# Patient Record
Sex: FEMALE | Race: WHITE | NOT HISPANIC OR LATINO | Employment: OTHER | URBAN - METROPOLITAN AREA
[De-identification: names, ages, dates, MRNs, and addresses within clinical notes are randomized per-mention and may not be internally consistent; named-entity substitution may affect disease eponyms.]

---

## 2018-02-26 ENCOUNTER — HOSPITAL ENCOUNTER (EMERGENCY)
Facility: HOSPITAL | Age: 33
Discharge: HOME | End: 2018-02-26
Attending: EMERGENCY MEDICINE
Payer: COMMERCIAL

## 2018-02-26 VITALS
OXYGEN SATURATION: 100 % | HEART RATE: 101 BPM | WEIGHT: 154 LBS | SYSTOLIC BLOOD PRESSURE: 152 MMHG | DIASTOLIC BLOOD PRESSURE: 96 MMHG | BODY MASS INDEX: 26.29 KG/M2 | HEIGHT: 64 IN | TEMPERATURE: 98 F | RESPIRATION RATE: 16 BRPM

## 2018-02-26 DIAGNOSIS — R07.9 CHEST PAIN, UNSPECIFIED TYPE: ICD-10-CM

## 2018-02-26 DIAGNOSIS — F41.0 PANIC ATTACK: Primary | ICD-10-CM

## 2018-02-26 LAB
ALBUMIN SERPL BCP-MCNC: 4.5 G/DL
ALP SERPL-CCNC: 69 U/L
ALT SERPL W/O P-5'-P-CCNC: 27 U/L
ANION GAP SERPL CALC-SCNC: 14 MMOL/L
AST SERPL-CCNC: 36 U/L
B-HCG UR QL: NEGATIVE
BASOPHILS # BLD AUTO: 0.02 K/UL
BASOPHILS NFR BLD: 0.1 %
BILIRUB SERPL-MCNC: 0.7 MG/DL
BUN SERPL-MCNC: 10 MG/DL
CALCIUM SERPL-MCNC: 9.9 MG/DL
CHLORIDE SERPL-SCNC: 99 MMOL/L
CO2 SERPL-SCNC: 24 MMOL/L
CREAT SERPL-MCNC: 0.8 MG/DL
CTP QC/QA: YES
DIFFERENTIAL METHOD: ABNORMAL
EOSINOPHIL # BLD AUTO: 0 K/UL
EOSINOPHIL NFR BLD: 0.1 %
ERYTHROCYTE [DISTWIDTH] IN BLOOD BY AUTOMATED COUNT: 12.3 %
EST. GFR  (AFRICAN AMERICAN): >60 ML/MIN/1.73 M^2
EST. GFR  (NON AFRICAN AMERICAN): >60 ML/MIN/1.73 M^2
GLUCOSE SERPL-MCNC: 103 MG/DL
HCT VFR BLD AUTO: 39.6 %
HGB BLD-MCNC: 13.4 G/DL
LYMPHOCYTES # BLD AUTO: 1.8 K/UL
LYMPHOCYTES NFR BLD: 12.8 %
MCH RBC QN AUTO: 31.8 PG
MCHC RBC AUTO-ENTMCNC: 33.8 G/DL
MCV RBC AUTO: 94 FL
MONOCYTES # BLD AUTO: 0.9 K/UL
MONOCYTES NFR BLD: 6.5 %
NEUTROPHILS # BLD AUTO: 11.3 K/UL
NEUTROPHILS NFR BLD: 80.3 %
PLATELET # BLD AUTO: 319 K/UL
PMV BLD AUTO: 9.4 FL
POTASSIUM SERPL-SCNC: 3.6 MMOL/L
PROT SERPL-MCNC: 8.3 G/DL
RBC # BLD AUTO: 4.21 M/UL
SODIUM SERPL-SCNC: 137 MMOL/L
TROPONIN I SERPL DL<=0.01 NG/ML-MCNC: <0.006 NG/ML
TSH SERPL DL<=0.005 MIU/L-ACNC: 2.54 UIU/ML
WBC # BLD AUTO: 14.06 K/UL

## 2018-02-26 PROCEDURE — 93010 ELECTROCARDIOGRAM REPORT: CPT | Mod: ,,, | Performed by: INTERNAL MEDICINE

## 2018-02-26 PROCEDURE — 84443 ASSAY THYROID STIM HORMONE: CPT

## 2018-02-26 PROCEDURE — 84484 ASSAY OF TROPONIN QUANT: CPT

## 2018-02-26 PROCEDURE — 96361 HYDRATE IV INFUSION ADD-ON: CPT

## 2018-02-26 PROCEDURE — 81025 URINE PREGNANCY TEST: CPT | Performed by: EMERGENCY MEDICINE

## 2018-02-26 PROCEDURE — 25000003 PHARM REV CODE 250: Performed by: EMERGENCY MEDICINE

## 2018-02-26 PROCEDURE — 80053 COMPREHEN METABOLIC PANEL: CPT

## 2018-02-26 PROCEDURE — 93005 ELECTROCARDIOGRAM TRACING: CPT

## 2018-02-26 PROCEDURE — 99284 EMERGENCY DEPT VISIT MOD MDM: CPT | Mod: 25

## 2018-02-26 PROCEDURE — 85025 COMPLETE CBC W/AUTO DIFF WBC: CPT

## 2018-02-26 PROCEDURE — 96360 HYDRATION IV INFUSION INIT: CPT

## 2018-02-26 RX ORDER — ESCITALOPRAM OXALATE 20 MG/1
20 TABLET ORAL DAILY
Status: ON HOLD | COMMUNITY
End: 2021-12-27

## 2018-02-26 RX ORDER — MULTIVIT WITH MINERALS/HERBS
1 TABLET ORAL DAILY
COMMUNITY

## 2018-02-26 RX ORDER — HYDROXYZINE HYDROCHLORIDE 25 MG/1
25 TABLET, FILM COATED ORAL 3 TIMES DAILY PRN
Status: ON HOLD | COMMUNITY
End: 2021-12-27

## 2018-02-26 RX ORDER — FERROUS SULFATE 325(65) MG
325 TABLET ORAL
COMMUNITY

## 2018-02-26 RX ADMIN — SODIUM CHLORIDE 1000 ML: 0.9 INJECTION, SOLUTION INTRAVENOUS at 03:02

## 2018-02-26 NOTE — ED NOTES
2 pt identifers for Lore Johnson verified and correct.    APPEARANCE: Alert, oriented and in no acute distress.  CARDIAC: pt tachycardic  PERIPHERAL VASCULAR: peripheral pulses present. Normal cap refill. No edema. Warm to touch.    RESPIRATORY:Normal rate and effort, breath sounds clear bilaterally throughout chest. Respirations are equal and unlabored no obvious signs of distress.  MUSC: Full ROM. No bony tenderness or soft tissue tenderness. No obvious deformity.  SKIN: Skin is warm and dry, normal skin turgor, mucous membranes moist.  NEURO:  [place] coma scale: eyes open spontaneously-4, oriented & converses-5, obeys commands-6. No neurological abnormalities. +reports bilateral tingling has completely resolved  MENTAL STATUS: awake, alert and aware of environment. +appears anxious  EYE: PERRL, both eyes: pupils brisk and reactive to light. Normal size.

## 2018-02-26 NOTE — ED NOTES
"Pt reports bilateral hand tingling and "dizziness with white spots" while she was at work. Pt has hx of panic attacks and recently switched to atarax and lexapro 3 weeks ago but does not feel like it's helping. Pt does not present in acute distress.   "

## 2018-02-26 NOTE — ED PROVIDER NOTES
Encounter Date: 2018    SCRIBE #1 NOTE: I, Cristofer Urias, am scribing for, and in the presence of, Dr. Ferrera.       History     Chief Complaint   Patient presents with    Panic Attack     pt was started on hydroxyzine for panic attacks 3 weeks ago. States today she suddenly became dizzy and began having numbness to entire body. Also c/o intermittent chest pains for the past few weeks     Lore Johnson is a 32 y.o. female who  has a past medical history of Allergy; Hydrocephalus; Hypertension; and  (ventriculoperitoneal) shunt status.    The patient presents to the ED due for evaluation after a panic attack. Patient was standing up at work today when she suddenly became dizzy, her vision went white, her hands became numb, and she began having chills and hot flashes. She reports having panic attacks for about 15 years. Patient was prescribed atarax and lexapro three weeks ago. Atarax has not helped. Five days ago, her lexapro was increased to 20 mg. She reports shortness of breath only during her panic attacks. Patient reports taking .25mg of xanax for her panic attacks before, which has provided relief. She also notes intermittent left sided chest pain for one month. She describes the pain as cinching. The pain sometimes moves up or down within chest, but does not radiate to any other locations. Patient has not met with a psychiatrist before for her panic attacks.      The history is provided by the patient.     Review of patient's allergies indicates:   Allergen Reactions    Sulfa (sulfonamide antibiotics) Shortness Of Breath and Swelling     Past Medical History:   Diagnosis Date    Allergy     Hydrocephalus     Hypertension      (ventriculoperitoneal) shunt status     not active currently     Past Surgical History:   Procedure Laterality Date     SECTION      VENTRICULOPERITONEAL SHUNT       Family History   Problem Relation Age of Onset    Diabetes Father     Breast cancer Neg Hx     Colon  cancer Neg Hx     Ovarian cancer Neg Hx     Stroke Neg Hx      Social History   Substance Use Topics    Smoking status: Former Smoker    Smokeless tobacco: Never Used    Alcohol use Yes      Comment: occ     Review of Systems   Constitutional: Positive for chills.        Hot flashes   Eyes: Positive for visual disturbance.   Respiratory: Positive for shortness of breath.    Cardiovascular: Positive for chest pain.   Neurological: Positive for dizziness and numbness.   Psychiatric/Behavioral: The patient is nervous/anxious.    All other systems reviewed and are negative.      Physical Exam     Initial Vitals [02/26/18 1403]   BP Pulse Resp Temp SpO2   (!) 164/105 (!) 124 (!) 22 99.4 °F (37.4 °C) 98 %      MAP       124.67         Physical Exam    Nursing note and vitals reviewed.  Constitutional: She appears well-developed and well-nourished. No distress.   HENT:   Head: Normocephalic and atraumatic.   Eyes: Conjunctivae are normal.   Neck: Normal range of motion.   Cardiovascular: Normal rate, regular rhythm and normal heart sounds.   No murmur heard.  Pulmonary/Chest: Breath sounds normal. No respiratory distress.   Abdominal: Bowel sounds are normal. She exhibits no distension.   Musculoskeletal: Normal range of motion.   Neurological: She is alert and oriented to person, place, and time.   Skin: Skin is warm and dry.   Psychiatric: Her behavior is normal. Her mood appears anxious.         ED Course   Procedures  Labs Reviewed   CBC W/ AUTO DIFFERENTIAL - Abnormal; Notable for the following:        Result Value    WBC 14.06 (*)     MCH 31.8 (*)     Gran # (ANC) 11.3 (*)     Gran% 80.3 (*)     Lymph% 12.8 (*)     All other components within normal limits   COMPREHENSIVE METABOLIC PANEL   TSH   TROPONIN I   TROPONIN I   POCT URINE PREGNANCY     EKG Readings: (Independently Interpreted)   Initial Reading: No STEMI. Rhythm: Sinus Tachycardia. Heart Rate: 103. Ectopy: No Ectopy. Conduction: Normal. ST Segments:  Normal ST Segments. T Waves: Normal. Clinical Impression: Normal Sinus Rhythm     Imaging Results          X-Ray Chest PA And Lateral (Final result)  Result time 02/26/18 15:26:59    Final result by Conor Higginbotham MD (02/26/18 15:26:59)                 Impression:        No acute radiographic findings in the chest.      Electronically signed by: CONOR HIGGINBOTHAM MD  Date:     02/26/18  Time:    15:26              Narrative:    Comparison: None    Technique: Chest PA and lateral.    Findings: The cardiac silhouette and pulmonary vascularity are within normal limits.  The lungs are symmetrically expanded without evidence of significant focal consolidation, effusion, or pneumothorax.  The bones demonstrate no acute abnormality.There is a partially visualized  shunt catheter tubing which has been cut with the tip at the level of the right clavicle.  There is mild scoliosis.                                 Medical Decision Making:   History:   Old Medical Records: I decided to obtain old medical records.  Clinical Tests:   Lab Tests: Ordered and Reviewed  Radiological Study: Ordered and Reviewed  Medical Tests: Ordered and Reviewed  ED Management:  32F with anxiety, recently started on medications, presents after a panic attack today.  Also reports intermittent left sided CP x 1 month.  Pt is anxious in ER.  Work up is negative for MI.  I feel this is all anxiety related.  I will have pt stop atarax since it is making her tired.  Continue lexapro daily and xanax prn.  Follow up with PCP and psych to discuss other options.                      Clinical Impression:   The primary encounter diagnosis was Panic attack. A diagnosis of Chest pain, unspecified type was also pertinent to this visit.          I, Dr. Trina Ferrera, personally performed the services described in this documentation.   All medical record entries made by the scribe were at my direction and in my presence.   I have reviewed the chart and agree that the  record is accurate and complete.   Trina Ferrera MD.  6:44 PM 02/26/2018                    Trina Ferrera MD  02/26/18 1856

## 2018-02-26 NOTE — ED NOTES
Pt ambulatory to and from restroom without difficulty, gait steady.  Resp =/unlabored.  Skin pink/warm/dry.

## 2018-02-27 NOTE — DISCHARGE INSTRUCTIONS
You can stop atarax.  Continue lexapro until you follow up with your doctor or a psychiatrist.  Benjamín to them about switching you to a different medication.   Take xanax as needed.  Avoid stimulants like coffee, tea, cola, chocolate.

## 2018-02-28 ENCOUNTER — OFFICE VISIT (OUTPATIENT)
Dept: PSYCHIATRY | Facility: CLINIC | Age: 33
End: 2018-02-28
Payer: COMMERCIAL

## 2018-02-28 DIAGNOSIS — F41.0 PANIC DISORDER: Primary | ICD-10-CM

## 2018-02-28 PROCEDURE — 90791 PSYCH DIAGNOSTIC EVALUATION: CPT | Mod: S$GLB,,, | Performed by: SOCIAL WORKER

## 2018-02-28 NOTE — PROGRESS NOTES
"Psychiatry Initial Visit (PhD/LCSW)  Diagnostic Interview - CPT 47611    Date: 2/28/2018    Site: [facility]    Referral source: self    Clinical status of patient: Outpatient    Lore Johnson, a 32 y.o. female, for initial evaluation visit.  Met with patient.    Chief complaint/reason for encounter: anxiety    History of present illness:  Panic attacks    Pain: noncontributory    Symptoms:   · Mood: denied    No dysphoria no suicidal thoughts no hx of attempts no access to firearm,   Motivation is good.     · Anxiety: first experience panic attack at 15 only once then another at 17 another at 19 or so and another at 23  And again sporadic after that.  However Dec started having them most often and to the point of having them every other day and affecting her life.  She worries of having more.   Last about 30 minutes no triggers sometimes.    · Worries, no restless, no muscle tension, no irritable, no rituals.   Socially anxious but denies avoidance although passed a role at work in her past because of this.     · She has been limiting driving because of the panic attacks.    · Substance abuse: denied  · Cognitive functioning: denied  · Health behaviors: noncontributory    Psychiatric history: had post partum depression 3 years ago took 3 meds for about 15 days does not know names.  Then stopped them.   was given atarax 3 weeks ago and it made her "a zombie".  then a few days ago she felt dizzy and this symptom evolved into a full panic attack and went to er.      She has been on lexapro for 3 weeks.     She does not know if it is helping. She explains that she is unable to know because she stopped the atarax two days ago.  Seems worried that although she may feel better it may be because she stopped atarax (or at least that's what I understood from this).         Medical history: none    Family history of psychiatric illness: father schizophrenia;  aunt panic attacks.     Social history (marriage, employment, " etc.):  She is heterosexual, has friends,  Lives with mother.  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  Likes it because she does not have to interact with people much.    Parents divorce when pt 13.   Has 3 year old child.   Average to poor student.   student and high school degree.  Slept in school talked.  Used to bite as child.   Forgetful.       Substance use:   Alcohol: infrequent   Drugs: none   Tobacco: none   Caffeine: 10 oz coffee      Current medications and drug reactions (include OTC, herbal): see medication list     Strengths and liabilities: Strength: Patient accepts guidance/feedback, Strength: Patient is expressive/articulate., Strength: Patient is motivated for change.    Current Evaluation:     Mental Status Exam:  General Appearance:  unremarkable, age appropriate.  Cleanly and properly dressed.  Not tearful or overly anxious.  Able to smile at times.  No psychomotor retardation or agitation.    Speech: normal tone, normal rate, normal pitch, normal volume. Unremarkable       Level of Cooperation: cooperative      Thought Processes: normal and logical   Mood: anxious      Thought Content: normal, no suicidality, no homicidality, delusions, or paranoia.  Denies delusions and hallucinations.    Affect: congruent and appropriate   Orientation: Oriented x3   Memory: recent >  intact   Attention Span & Concentration: intact   Fund of General Knowledge: intact and appropriate to age and level of education   Abstract Reasoning: interpretation of similarities was abstract, interpretation of proverbs was abstract   Judgment & Insight: good, fair     Language  intact     Diagnostic Impression - Plan:       ICD-10-CM ICD-9-CM   1. Panic disorder F41.0 300.01       Plan:individual psychotherapy.  BMU offered but declined.  Pt requests consult with psychiatrist.  Pt will set this appointment as well.  I underscore the effectiveness of cognitive behavioral therapy for this disorder.  She was under the impression I was  a psychiatrist (as I found out at the end of the evaluation) and expected me to give her a prescription.      Return to Clinic: 3 weeks    Length of Service (minutes): 45

## 2019-01-30 ENCOUNTER — HOSPITAL ENCOUNTER (EMERGENCY)
Facility: HOSPITAL | Age: 34
Discharge: HOME | End: 2019-01-30
Attending: EMERGENCY MEDICINE
Payer: MEDICAID

## 2019-01-30 VITALS
BODY MASS INDEX: 27.31 KG/M2 | WEIGHT: 160 LBS | HEART RATE: 84 BPM | SYSTOLIC BLOOD PRESSURE: 142 MMHG | DIASTOLIC BLOOD PRESSURE: 93 MMHG | RESPIRATION RATE: 16 BRPM | TEMPERATURE: 99 F | HEIGHT: 64 IN | OXYGEN SATURATION: 100 %

## 2019-01-30 DIAGNOSIS — N39.0 URINARY TRACT INFECTION WITHOUT HEMATURIA, SITE UNSPECIFIED: Primary | ICD-10-CM

## 2019-01-30 DIAGNOSIS — R42 DIZZINESS: ICD-10-CM

## 2019-01-30 DIAGNOSIS — R79.89 ELEVATED LFTS: ICD-10-CM

## 2019-01-30 DIAGNOSIS — I95.1 ORTHOSTATIC HYPOTENSION: ICD-10-CM

## 2019-01-30 LAB
ALBUMIN SERPL BCP-MCNC: 3.9 G/DL
ALP SERPL-CCNC: 97 U/L
ALT SERPL W/O P-5'-P-CCNC: 88 U/L
ANION GAP SERPL CALC-SCNC: 14 MMOL/L
APAP SERPL-MCNC: <3 UG/ML
AST SERPL-CCNC: 241 U/L
B-HCG UR QL: NEGATIVE
BACTERIA #/AREA URNS AUTO: ABNORMAL /HPF
BASOPHILS # BLD AUTO: 0.05 K/UL
BASOPHILS NFR BLD: 0.9 %
BILIRUB SERPL-MCNC: 0.9 MG/DL
BILIRUB UR QL STRIP: NEGATIVE
BUN SERPL-MCNC: 9 MG/DL
CALCIUM SERPL-MCNC: 8.8 MG/DL
CHLORIDE SERPL-SCNC: 101 MMOL/L
CLARITY UR REFRACT.AUTO: CLEAR
CO2 SERPL-SCNC: 19 MMOL/L
COLOR UR AUTO: YELLOW
CREAT SERPL-MCNC: 0.7 MG/DL
CTP QC/QA: YES
DIFFERENTIAL METHOD: ABNORMAL
EOSINOPHIL # BLD AUTO: 0.1 K/UL
EOSINOPHIL NFR BLD: 1.4 %
ERYTHROCYTE [DISTWIDTH] IN BLOOD BY AUTOMATED COUNT: 13.4 %
EST. GFR  (AFRICAN AMERICAN): >60 ML/MIN/1.73 M^2
EST. GFR  (NON AFRICAN AMERICAN): >60 ML/MIN/1.73 M^2
ETHANOL SERPL-MCNC: <10 MG/DL
GLUCOSE SERPL-MCNC: 113 MG/DL
GLUCOSE UR QL STRIP: NEGATIVE
HCT VFR BLD AUTO: 42.4 %
HGB BLD-MCNC: 14.3 G/DL
HGB UR QL STRIP: NEGATIVE
IMM GRANULOCYTES # BLD AUTO: 0.01 K/UL
IMM GRANULOCYTES NFR BLD AUTO: 0.2 %
KETONES UR QL STRIP: NEGATIVE
LEUKOCYTE ESTERASE UR QL STRIP: NEGATIVE
LIPASE SERPL-CCNC: 22 U/L
LYMPHOCYTES # BLD AUTO: 1.6 K/UL
LYMPHOCYTES NFR BLD: 29.1 %
MCH RBC QN AUTO: 33 PG
MCHC RBC AUTO-ENTMCNC: 33.7 G/DL
MCV RBC AUTO: 98 FL
MICROSCOPIC COMMENT: ABNORMAL
MONOCYTES # BLD AUTO: 0.6 K/UL
MONOCYTES NFR BLD: 9.9 %
NEUTROPHILS # BLD AUTO: 3.3 K/UL
NEUTROPHILS NFR BLD: 58.5 %
NITRITE UR QL STRIP: POSITIVE
NRBC BLD-RTO: 0 /100 WBC
PH UR STRIP: 7 [PH] (ref 5–8)
PLATELET # BLD AUTO: 292 K/UL
PMV BLD AUTO: 10.5 FL
POTASSIUM SERPL-SCNC: 4.6 MMOL/L
PROT SERPL-MCNC: 7.5 G/DL
PROT UR QL STRIP: NEGATIVE
RBC # BLD AUTO: 4.33 M/UL
SODIUM SERPL-SCNC: 134 MMOL/L
SP GR UR STRIP: 1.01 (ref 1–1.03)
SQUAMOUS #/AREA URNS AUTO: 1 /HPF
URN SPEC COLLECT METH UR: ABNORMAL
WBC # BLD AUTO: 5.57 K/UL

## 2019-01-30 PROCEDURE — 93010 EKG 12-LEAD: ICD-10-PCS | Mod: ,,, | Performed by: INTERNAL MEDICINE

## 2019-01-30 PROCEDURE — 80053 COMPREHEN METABOLIC PANEL: CPT

## 2019-01-30 PROCEDURE — 99285 PR EMERGENCY DEPT VISIT,LEVEL V: ICD-10-PCS | Mod: ,,, | Performed by: EMERGENCY MEDICINE

## 2019-01-30 PROCEDURE — 80320 DRUG SCREEN QUANTALCOHOLS: CPT

## 2019-01-30 PROCEDURE — 81001 URINALYSIS AUTO W/SCOPE: CPT

## 2019-01-30 PROCEDURE — 63600175 PHARM REV CODE 636 W HCPCS: Performed by: EMERGENCY MEDICINE

## 2019-01-30 PROCEDURE — 96360 HYDRATION IV INFUSION INIT: CPT | Mod: 59

## 2019-01-30 PROCEDURE — 87040 BLOOD CULTURE FOR BACTERIA: CPT

## 2019-01-30 PROCEDURE — 96374 THER/PROPH/DIAG INJ IV PUSH: CPT

## 2019-01-30 PROCEDURE — 80329 ANALGESICS NON-OPIOID 1 OR 2: CPT

## 2019-01-30 PROCEDURE — 83690 ASSAY OF LIPASE: CPT

## 2019-01-30 PROCEDURE — 85025 COMPLETE CBC W/AUTO DIFF WBC: CPT

## 2019-01-30 PROCEDURE — 93005 ELECTROCARDIOGRAM TRACING: CPT

## 2019-01-30 PROCEDURE — 99285 EMERGENCY DEPT VISIT HI MDM: CPT | Mod: ,,, | Performed by: EMERGENCY MEDICINE

## 2019-01-30 PROCEDURE — 80074 ACUTE HEPATITIS PANEL: CPT

## 2019-01-30 PROCEDURE — 99285 EMERGENCY DEPT VISIT HI MDM: CPT | Mod: 25

## 2019-01-30 PROCEDURE — 96361 HYDRATE IV INFUSION ADD-ON: CPT

## 2019-01-30 PROCEDURE — 93010 ELECTROCARDIOGRAM REPORT: CPT | Mod: ,,, | Performed by: INTERNAL MEDICINE

## 2019-01-30 PROCEDURE — 81025 URINE PREGNANCY TEST: CPT | Performed by: EMERGENCY MEDICINE

## 2019-01-30 PROCEDURE — 25000003 PHARM REV CODE 250: Performed by: EMERGENCY MEDICINE

## 2019-01-30 RX ORDER — PROPRANOLOL HYDROCHLORIDE 40 MG/1
40 TABLET ORAL 3 TIMES DAILY
Status: ON HOLD | COMMUNITY
End: 2021-12-27

## 2019-01-30 RX ORDER — CEPHALEXIN 500 MG/1
500 CAPSULE ORAL EVERY 12 HOURS
Qty: 10 CAPSULE | Refills: 0 | Status: SHIPPED | OUTPATIENT
Start: 2019-01-30 | End: 2019-02-04

## 2019-01-30 RX ORDER — MECLIZINE HYDROCHLORIDE 25 MG/1
25 TABLET ORAL 3 TIMES DAILY PRN
Qty: 20 TABLET | Refills: 0 | Status: SHIPPED | OUTPATIENT
Start: 2019-01-30

## 2019-01-30 RX ORDER — CEFTRIAXONE 1 G/1
1 INJECTION, POWDER, FOR SOLUTION INTRAMUSCULAR; INTRAVENOUS
Status: COMPLETED | OUTPATIENT
Start: 2019-01-30 | End: 2019-01-30

## 2019-01-30 RX ORDER — MECLIZINE HCL 12.5 MG 12.5 MG/1
25 TABLET ORAL
Status: COMPLETED | OUTPATIENT
Start: 2019-01-30 | End: 2019-01-30

## 2019-01-30 RX ADMIN — CEFTRIAXONE SODIUM 1 G: 1 INJECTION, POWDER, FOR SOLUTION INTRAMUSCULAR; INTRAVENOUS at 02:01

## 2019-01-30 RX ADMIN — MECLIZINE 25 MG: 12.5 TABLET ORAL at 12:01

## 2019-01-30 RX ADMIN — SODIUM CHLORIDE 1000 ML: 0.9 INJECTION, SOLUTION INTRAVENOUS at 12:01

## 2019-01-30 NOTE — ED TRIAGE NOTES
"Pt reports dizziness "that doesn't stop" x 5 days; denies relief with position change; pt denies HA, blurred vision, N/V; denies hx of symptoms; denies CP, SOB; pt A&Ox4; respirations even, unlabored; pt ambulating with steady gait  "

## 2019-01-31 LAB
HAV IGM SERPL QL IA: NEGATIVE
HBV CORE IGM SERPL QL IA: NEGATIVE
HBV SURFACE AG SERPL QL IA: NEGATIVE
HCV AB SERPL QL IA: NEGATIVE

## 2019-02-04 LAB
BACTERIA BLD CULT: NORMAL
BACTERIA BLD CULT: NORMAL

## 2020-09-28 ENCOUNTER — HOSPITAL ENCOUNTER (EMERGENCY)
Facility: HOSPITAL | Age: 35
Discharge: HOME | End: 2020-09-28
Attending: EMERGENCY MEDICINE
Payer: MEDICAID

## 2020-09-28 VITALS
BODY MASS INDEX: 27.31 KG/M2 | OXYGEN SATURATION: 100 % | HEART RATE: 78 BPM | DIASTOLIC BLOOD PRESSURE: 88 MMHG | HEIGHT: 64 IN | WEIGHT: 160 LBS | RESPIRATION RATE: 18 BRPM | TEMPERATURE: 98 F | SYSTOLIC BLOOD PRESSURE: 146 MMHG

## 2020-09-28 DIAGNOSIS — M19.071 ARTHRITIS OF BOTH FEET: Primary | ICD-10-CM

## 2020-09-28 DIAGNOSIS — M19.072 ARTHRITIS OF BOTH FEET: Primary | ICD-10-CM

## 2020-09-28 DIAGNOSIS — F41.9 ANXIETY: ICD-10-CM

## 2020-09-28 PROCEDURE — 99283 EMERGENCY DEPT VISIT LOW MDM: CPT

## 2020-09-28 RX ORDER — HYDROXYZINE PAMOATE 25 MG/1
25 CAPSULE ORAL 4 TIMES DAILY
Qty: 15 CAPSULE | Refills: 0 | Status: ON HOLD | OUTPATIENT
Start: 2020-09-28 | End: 2021-12-27

## 2020-09-28 NOTE — ED NOTES
Pt was brought to ED by [facility] for evaluation of bilateral foot pain, anxiety, and spasms.   Pt reports pain to sides and bottom of feet and toes. States she went to urgent care and got an injection with moderate relief but denies filling her rx.     Despite treatment, pt reports feeling anxious secondary to foot pain. Pt reports that her anxiousness resulted in hyperventilation and then her hands locked up.      No spasms noted presently. RR WNL.    Pt is alert, age appropriate and in no acute distress. Respirations are even and unlabored. Bilateral breath sounds are clear throughout chest. abd is soft, not tender and not distended. pt denies change in feeding, bowel or bladder habits. Skin is warm and color is appropriate for ethnicity.  No edema noted to bilateral lower extremities. Pt moves all extremities well. Conjunctivae normal. Pt is dressed appropriately and well groomed.

## 2020-09-28 NOTE — ED PROVIDER NOTES
"Encounter Date: 2020    SCRIBE #1 NOTE: I, Yeimi Daniels, am scribing for, and in the presence of,  Dr. Jimenez. I have scribed the entire note.       History     Chief Complaint   Patient presents with    Foot Pain     pt was seen at urgent care and dx with gout. pt recieved a shot and reprst improvement    Spasms     durign urgent care visit pt reports hand spasms. hand spasms continued while at home so pt came to ED via [facility] for evaluation. pt states she has been anxious all day and spasms worsen when her respiratory rate increases.      Lore Johnson is a 35 y.o. female with a past medical history of hypertension who presents to the ED due to bilateral foot pain and swelling with associated involuntary hand spasms onset today. Patient locates pain to sides and bottom of feet, as well as at the base of all toes. She reports being seen at urgent care this morning, was diagnosed with gout, and given a shot with improvement of bilateral leg pain. However, involuntary hand spasms persisted while at home, so she called 911. Patient attests to being "severly anxious," and states she has had multiple episodes of palpitations with increased breathing rate today. She requests medications to make her "feel normal again."    The history is provided by the patient.     Review of patient's allergies indicates:   Allergen Reactions    Sulfa (sulfonamide antibiotics) Shortness Of Breath and Swelling     Past Medical History:   Diagnosis Date    Allergy     Hydrocephalus     Hypertension      (ventriculoperitoneal) shunt status     not active currently     Past Surgical History:   Procedure Laterality Date     SECTION      VENTRICULOPERITONEAL SHUNT       Family History   Problem Relation Age of Onset    Diabetes Father     Breast cancer Neg Hx     Colon cancer Neg Hx     Ovarian cancer Neg Hx     Stroke Neg Hx      Social History     Tobacco Use    Smoking status: Former Smoker    " Smokeless tobacco: Never Used   Substance Use Topics    Alcohol use: Yes     Comment: occ    Drug use: No     Review of Systems   Constitutional: Negative for fever.   HENT: Negative for sore throat.    Respiratory: Negative for shortness of breath.    Cardiovascular: Positive for leg swelling (Bilateral). Negative for chest pain.   Gastrointestinal: Negative for nausea.   Genitourinary: Negative for dysuria.   Musculoskeletal: Positive for arthralgias (Bilateral feet). Negative for back pain.   Skin: Negative for rash.   Neurological: Negative for weakness.   Hematological: Does not bruise/bleed easily.   Psychiatric/Behavioral: The patient is nervous/anxious.        Physical Exam     Initial Vitals [09/28/20 1426]   BP Pulse Resp Temp SpO2   (!) 146/88 78 18 97.8 °F (36.6 °C) 100 %      MAP       --         Physical Exam    Nursing note and vitals reviewed.  Constitutional: She appears well-developed and well-nourished. She is not diaphoretic. No distress.   HENT:   Head: Normocephalic and atraumatic.   Right Ear: External ear normal.   Left Ear: External ear normal.   Nose: Nose normal.   Eyes: Conjunctivae and EOM are normal. Pupils are equal, round, and reactive to light. Right eye exhibits no discharge. Left eye exhibits no discharge. No scleral icterus.   Neck: Normal range of motion.   Cardiovascular: Normal rate.   Peripheral perfusion appropriate   Pulmonary/Chest: Breath sounds normal. No stridor. No respiratory distress.   Musculoskeletal: Normal range of motion. No edema.      Comments: Bilateral feet with no evidence of edema or swelling. Full ROM. No overlying erythema. Ambulatory on feet.    Neurological: She is alert. She has normal strength. No cranial nerve deficit.   Skin: Skin is warm and dry. No rash noted. No pallor.   Psychiatric: She has a normal mood and affect.         ED Course   Procedures  Labs Reviewed - No data to display       Imaging Results    None          Medical Decision  Making:   History:   Old Medical Records: I decided to obtain old medical records.            Scribe Attestation:   Scribe #1: I performed the above scribed service and the documentation accurately describes the services I performed. I attest to the accuracy of the note.                      Clinical Impression:     ICD-10-CM ICD-9-CM   1. Arthritis of both feet  M19.071 716.97    M19.072    2. Anxiety  F41.9 300.00                          ED Disposition Condition    Discharge Stable        ED Prescriptions     Medication Sig Dispense Start Date End Date Auth. Provider    hydrOXYzine pamoate (VISTARIL) 25 MG Cap Take 1 capsule (25 mg total) by mouth 4 (four) times daily. 15 capsule 9/28/2020  Hollie Jimenez MD        Follow-up Information     Follow up With Specialties Details Why Contact Info Additional Information    [facility] Schedule an appointment as soon as possible for a visit   [address], [address]  [facility] [address] [phone] At this time [facility] will only use these entries [facility], [facility], and Emergency Department due to COVID-19 precautions.                       I, Hollie Jimenez, personally performed the services described in this documentation. All medical record entries made by the scribe were at my direction and in my presence.  I have reviewed the chart and agree that the record reflects my personal performance and is accurate and complete. Hollie Jimenez M.D. 3:46 PM09/28/2020               Hollie Jimenez MD  09/28/20 1546

## 2021-02-17 ENCOUNTER — HOSPITAL ENCOUNTER (EMERGENCY)
Facility: HOSPITAL | Age: 36
Discharge: HOME | End: 2021-02-18
Attending: EMERGENCY MEDICINE
Payer: MEDICAID

## 2021-02-17 DIAGNOSIS — F50.89 PSYCHOGENIC VOMITING WITH NAUSEA: ICD-10-CM

## 2021-02-17 DIAGNOSIS — R11.2 NON-INTRACTABLE VOMITING WITH NAUSEA, UNSPECIFIED VOMITING TYPE: Primary | ICD-10-CM

## 2021-02-17 DIAGNOSIS — E86.0 DEHYDRATION: ICD-10-CM

## 2021-02-17 DIAGNOSIS — F41.9 ANXIETY: ICD-10-CM

## 2021-02-17 DIAGNOSIS — R00.0 TACHYCARDIA: ICD-10-CM

## 2021-02-17 DIAGNOSIS — K29.70 GASTRITIS, PRESENCE OF BLEEDING UNSPECIFIED, UNSPECIFIED CHRONICITY, UNSPECIFIED GASTRITIS TYPE: ICD-10-CM

## 2021-02-17 LAB
BASOPHILS # BLD AUTO: 0.06 K/UL (ref 0–0.2)
BASOPHILS NFR BLD: 0.4 % (ref 0–1.9)
DIFFERENTIAL METHOD: ABNORMAL
EOSINOPHIL # BLD AUTO: 0 K/UL (ref 0–0.5)
EOSINOPHIL NFR BLD: 0 % (ref 0–8)
ERYTHROCYTE [DISTWIDTH] IN BLOOD BY AUTOMATED COUNT: 14.4 % (ref 11.5–14.5)
HCT VFR BLD AUTO: 40.6 % (ref 37–48.5)
HGB BLD-MCNC: 14 G/DL (ref 12–16)
IMM GRANULOCYTES # BLD AUTO: 0.06 K/UL (ref 0–0.04)
IMM GRANULOCYTES NFR BLD AUTO: 0.4 % (ref 0–0.5)
LYMPHOCYTES # BLD AUTO: 0.7 K/UL (ref 1–4.8)
LYMPHOCYTES NFR BLD: 5 % (ref 18–48)
MCH RBC QN AUTO: 33.7 PG (ref 27–31)
MCHC RBC AUTO-ENTMCNC: 34.5 G/DL (ref 32–36)
MCV RBC AUTO: 98 FL (ref 82–98)
MONOCYTES # BLD AUTO: 1.2 K/UL (ref 0.3–1)
MONOCYTES NFR BLD: 8.8 % (ref 4–15)
NEUTROPHILS # BLD AUTO: 12 K/UL (ref 1.8–7.7)
NEUTROPHILS NFR BLD: 85.4 % (ref 38–73)
NRBC BLD-RTO: 0 /100 WBC
PLATELET # BLD AUTO: 189 K/UL (ref 150–350)
PMV BLD AUTO: 9.6 FL (ref 9.2–12.9)
RBC # BLD AUTO: 4.16 M/UL (ref 4–5.4)
TROPONIN I SERPL DL<=0.01 NG/ML-MCNC: <0.006 NG/ML (ref 0–0.03)
WBC # BLD AUTO: 14.03 K/UL (ref 3.9–12.7)

## 2021-02-17 PROCEDURE — 93010 ELECTROCARDIOGRAM REPORT: CPT | Mod: ,,, | Performed by: INTERNAL MEDICINE

## 2021-02-17 PROCEDURE — 93010 EKG 12-LEAD: ICD-10-PCS | Mod: ,,, | Performed by: INTERNAL MEDICINE

## 2021-02-17 PROCEDURE — 25000003 PHARM REV CODE 250: Performed by: EMERGENCY MEDICINE

## 2021-02-17 PROCEDURE — 99285 EMERGENCY DEPT VISIT HI MDM: CPT | Mod: 25

## 2021-02-17 PROCEDURE — 96375 TX/PRO/DX INJ NEW DRUG ADDON: CPT

## 2021-02-17 PROCEDURE — 96374 THER/PROPH/DIAG INJ IV PUSH: CPT

## 2021-02-17 PROCEDURE — 83690 ASSAY OF LIPASE: CPT

## 2021-02-17 PROCEDURE — 84484 ASSAY OF TROPONIN QUANT: CPT

## 2021-02-17 PROCEDURE — 80053 COMPREHEN METABOLIC PANEL: CPT

## 2021-02-17 PROCEDURE — 63600175 PHARM REV CODE 636 W HCPCS: Performed by: EMERGENCY MEDICINE

## 2021-02-17 PROCEDURE — 85025 COMPLETE CBC W/AUTO DIFF WBC: CPT

## 2021-02-17 PROCEDURE — 93005 ELECTROCARDIOGRAM TRACING: CPT

## 2021-02-17 PROCEDURE — 96361 HYDRATE IV INFUSION ADD-ON: CPT

## 2021-02-17 RX ORDER — ONDANSETRON 2 MG/ML
8 INJECTION INTRAMUSCULAR; INTRAVENOUS
Status: COMPLETED | OUTPATIENT
Start: 2021-02-17 | End: 2021-02-17

## 2021-02-17 RX ORDER — FAMOTIDINE 10 MG/ML
40 INJECTION INTRAVENOUS ONCE
Status: COMPLETED | OUTPATIENT
Start: 2021-02-17 | End: 2021-02-17

## 2021-02-17 RX ORDER — MAG HYDROX/ALUMINUM HYD/SIMETH 200-200-20
15 SUSPENSION, ORAL (FINAL DOSE FORM) ORAL
Status: COMPLETED | OUTPATIENT
Start: 2021-02-17 | End: 2021-02-17

## 2021-02-17 RX ADMIN — ALUMINUM HYDROXIDE, MAGNESIUM HYDROXIDE, AND SIMETHICONE 15 ML: 200; 200; 20 SUSPENSION ORAL at 11:02

## 2021-02-17 RX ADMIN — ONDANSETRON HYDROCHLORIDE 8 MG: 2 SOLUTION INTRAMUSCULAR; INTRAVENOUS at 11:02

## 2021-02-17 RX ADMIN — FAMOTIDINE 40 MG: 10 INJECTION, SOLUTION INTRAVENOUS at 11:02

## 2021-02-18 VITALS
TEMPERATURE: 99 F | BODY MASS INDEX: 24.75 KG/M2 | RESPIRATION RATE: 22 BRPM | WEIGHT: 145 LBS | OXYGEN SATURATION: 100 % | SYSTOLIC BLOOD PRESSURE: 122 MMHG | HEART RATE: 105 BPM | HEIGHT: 64 IN | DIASTOLIC BLOOD PRESSURE: 61 MMHG

## 2021-02-18 LAB
ALBUMIN SERPL BCP-MCNC: 3.9 G/DL (ref 3.5–5.2)
ALP SERPL-CCNC: 193 U/L (ref 55–135)
ALT SERPL W/O P-5'-P-CCNC: 63 U/L (ref 10–44)
ANION GAP SERPL CALC-SCNC: 26 MMOL/L (ref 8–16)
AST SERPL-CCNC: 284 U/L (ref 10–40)
BACTERIA #/AREA URNS HPF: ABNORMAL /HPF
BILIRUB SERPL-MCNC: 1.4 MG/DL (ref 0.1–1)
BILIRUB UR QL STRIP: ABNORMAL
BUN SERPL-MCNC: 7 MG/DL (ref 6–20)
CALCIUM SERPL-MCNC: 9.7 MG/DL (ref 8.7–10.5)
CAOX CRY URNS QL MICRO: ABNORMAL
CHLORIDE SERPL-SCNC: 92 MMOL/L (ref 95–110)
CLARITY UR: ABNORMAL
CO2 SERPL-SCNC: 23 MMOL/L (ref 23–29)
COLOR UR: YELLOW
CREAT SERPL-MCNC: 1 MG/DL (ref 0.5–1.4)
EST. GFR  (AFRICAN AMERICAN): >60 ML/MIN/1.73 M^2
EST. GFR  (NON AFRICAN AMERICAN): >60 ML/MIN/1.73 M^2
GLUCOSE SERPL-MCNC: 104 MG/DL (ref 70–110)
GLUCOSE UR QL STRIP: NEGATIVE
HGB UR QL STRIP: ABNORMAL
KETONES UR QL STRIP: ABNORMAL
LEUKOCYTE ESTERASE UR QL STRIP: ABNORMAL
LIPASE SERPL-CCNC: 20 U/L (ref 4–60)
MICROSCOPIC COMMENT: ABNORMAL
NITRITE UR QL STRIP: NEGATIVE
PH UR STRIP: 6 [PH] (ref 5–8)
POTASSIUM SERPL-SCNC: 3.2 MMOL/L (ref 3.5–5.1)
PROT SERPL-MCNC: 8.2 G/DL (ref 6–8.4)
PROT UR QL STRIP: ABNORMAL
RBC #/AREA URNS HPF: 3 /HPF (ref 0–4)
SODIUM SERPL-SCNC: 141 MMOL/L (ref 136–145)
SP GR UR STRIP: 1.03 (ref 1–1.03)
SQUAMOUS #/AREA URNS HPF: 4 /HPF
URN SPEC COLLECT METH UR: ABNORMAL
UROBILINOGEN UR STRIP-ACNC: NEGATIVE EU/DL
WBC #/AREA URNS HPF: 11 /HPF (ref 0–5)

## 2021-02-18 PROCEDURE — 63600175 PHARM REV CODE 636 W HCPCS: Performed by: EMERGENCY MEDICINE

## 2021-02-18 PROCEDURE — 87086 URINE CULTURE/COLONY COUNT: CPT

## 2021-02-18 PROCEDURE — 87186 SC STD MICRODIL/AGAR DIL: CPT

## 2021-02-18 PROCEDURE — 87088 URINE BACTERIA CULTURE: CPT

## 2021-02-18 PROCEDURE — 25000003 PHARM REV CODE 250: Performed by: EMERGENCY MEDICINE

## 2021-02-18 PROCEDURE — 87077 CULTURE AEROBIC IDENTIFY: CPT

## 2021-02-18 PROCEDURE — 81000 URINALYSIS NONAUTO W/SCOPE: CPT

## 2021-02-18 RX ORDER — DICYCLOMINE HYDROCHLORIDE 20 MG/1
20 TABLET ORAL 2 TIMES DAILY
Qty: 20 TABLET | Refills: 0 | Status: SHIPPED | OUTPATIENT
Start: 2021-02-18 | End: 2021-03-20

## 2021-02-18 RX ORDER — ONDANSETRON 4 MG/1
4 TABLET, ORALLY DISINTEGRATING ORAL EVERY 6 HOURS PRN
Qty: 20 TABLET | Refills: 0 | Status: ON HOLD | OUTPATIENT
Start: 2021-02-18 | End: 2021-12-28 | Stop reason: HOSPADM

## 2021-02-18 RX ORDER — HYDROXYZINE HYDROCHLORIDE 25 MG/1
50 TABLET, FILM COATED ORAL 3 TIMES DAILY PRN
Qty: 30 TABLET | Refills: 0 | Status: ON HOLD | OUTPATIENT
Start: 2021-02-18 | End: 2021-12-27

## 2021-02-18 RX ORDER — HYDROXYZINE PAMOATE 25 MG/1
50 CAPSULE ORAL
Status: COMPLETED | OUTPATIENT
Start: 2021-02-18 | End: 2021-02-18

## 2021-02-18 RX ORDER — ONDANSETRON 4 MG/1
4 TABLET, ORALLY DISINTEGRATING ORAL
Status: COMPLETED | OUTPATIENT
Start: 2021-02-18 | End: 2021-02-18

## 2021-02-18 RX ORDER — HALOPERIDOL 5 MG/ML
5 INJECTION INTRAMUSCULAR
Status: DISCONTINUED | OUTPATIENT
Start: 2021-02-18 | End: 2021-02-18

## 2021-02-18 RX ORDER — SUCRALFATE 1 G/10ML
1 SUSPENSION ORAL 4 TIMES DAILY
Qty: 1 BOTTLE | Refills: 1 | Status: ON HOLD | OUTPATIENT
Start: 2021-02-18 | End: 2021-12-27

## 2021-02-18 RX ORDER — FAMOTIDINE 20 MG/1
20 TABLET, FILM COATED ORAL 2 TIMES DAILY
Qty: 60 TABLET | Refills: 1 | Status: SHIPPED | OUTPATIENT
Start: 2021-02-18 | End: 2021-04-19

## 2021-02-18 RX ORDER — PROMETHAZINE HYDROCHLORIDE 25 MG/1
25 TABLET ORAL EVERY 6 HOURS PRN
Qty: 15 TABLET | Refills: 0 | Status: SHIPPED | OUTPATIENT
Start: 2021-02-18

## 2021-02-18 RX ADMIN — PROMETHAZINE HYDROCHLORIDE 25 MG: 25 INJECTION INTRAMUSCULAR; INTRAVENOUS at 01:02

## 2021-02-18 RX ADMIN — SODIUM CHLORIDE 1000 ML: 0.9 INJECTION, SOLUTION INTRAVENOUS at 12:02

## 2021-02-18 RX ADMIN — HYDROXYZINE PAMOATE 50 MG: 25 CAPSULE ORAL at 03:02

## 2021-02-18 RX ADMIN — SODIUM CHLORIDE 1000 ML: 0.9 INJECTION, SOLUTION INTRAVENOUS at 01:02

## 2021-02-18 RX ADMIN — ONDANSETRON 4 MG: 4 TABLET, ORALLY DISINTEGRATING ORAL at 03:02

## 2021-02-20 LAB — BACTERIA UR CULT: ABNORMAL

## 2021-05-07 ENCOUNTER — TELEPHONE (OUTPATIENT)
Dept: GASTROENTEROLOGY | Facility: CLINIC | Age: 36
End: 2021-05-07

## 2021-08-05 DIAGNOSIS — M51.36 DEGENERATIVE DISC DISEASE, LUMBAR: Primary | ICD-10-CM

## 2021-12-26 ENCOUNTER — HOSPITAL ENCOUNTER (INPATIENT)
Facility: HOSPITAL | Age: 36
LOS: 4 days | Discharge: HOME | DRG: 690 | End: 2021-12-30
Attending: EMERGENCY MEDICINE | Admitting: INTERNAL MEDICINE
Payer: MEDICAID

## 2021-12-26 DIAGNOSIS — R07.9 CHEST PAIN: ICD-10-CM

## 2021-12-26 DIAGNOSIS — G93.40 ACUTE ENCEPHALOPATHY: ICD-10-CM

## 2021-12-26 DIAGNOSIS — N39.0 URINARY TRACT INFECTION WITHOUT HEMATURIA, SITE UNSPECIFIED: ICD-10-CM

## 2021-12-26 DIAGNOSIS — F29 PSYCHOSIS, UNSPECIFIED PSYCHOSIS TYPE: Primary | ICD-10-CM

## 2021-12-26 LAB
ALBUMIN SERPL BCP-MCNC: 4 G/DL (ref 3.5–5.2)
ALP SERPL-CCNC: 65 U/L (ref 55–135)
ALT SERPL W/O P-5'-P-CCNC: 22 U/L (ref 10–44)
AMPHET+METHAMPHET UR QL: NEGATIVE
ANION GAP SERPL CALC-SCNC: 14 MMOL/L (ref 8–16)
APAP SERPL-MCNC: <3 UG/ML (ref 10–20)
AST SERPL-CCNC: 30 U/L (ref 10–40)
B-HCG UR QL: NEGATIVE
BACTERIA #/AREA URNS HPF: ABNORMAL /HPF
BARBITURATES UR QL SCN>200 NG/ML: NEGATIVE
BASOPHILS # BLD AUTO: 0.02 K/UL (ref 0–0.2)
BASOPHILS NFR BLD: 0.3 % (ref 0–1.9)
BENZODIAZ UR QL SCN>200 NG/ML: ABNORMAL
BILIRUB SERPL-MCNC: 0.3 MG/DL (ref 0.1–1)
BILIRUB UR QL STRIP: NEGATIVE
BUN SERPL-MCNC: 17 MG/DL (ref 6–20)
BZE UR QL SCN: NEGATIVE
CALCIUM SERPL-MCNC: 8.7 MG/DL (ref 8.7–10.5)
CANNABINOIDS UR QL SCN: NEGATIVE
CHLORIDE SERPL-SCNC: 109 MMOL/L (ref 95–110)
CLARITY UR: ABNORMAL
CO2 SERPL-SCNC: 18 MMOL/L (ref 23–29)
COLOR UR: YELLOW
CREAT SERPL-MCNC: 0.9 MG/DL (ref 0.5–1.4)
CREAT UR-MCNC: 62.1 MG/DL (ref 15–325)
CTP QC/QA: YES
DIFFERENTIAL METHOD: ABNORMAL
EOSINOPHIL # BLD AUTO: 0 K/UL (ref 0–0.5)
EOSINOPHIL NFR BLD: 0.6 % (ref 0–8)
ERYTHROCYTE [DISTWIDTH] IN BLOOD BY AUTOMATED COUNT: 13.5 % (ref 11.5–14.5)
EST. GFR  (AFRICAN AMERICAN): >60 ML/MIN/1.73 M^2
EST. GFR  (NON AFRICAN AMERICAN): >60 ML/MIN/1.73 M^2
ETHANOL SERPL-MCNC: <10 MG/DL
GLUCOSE SERPL-MCNC: 87 MG/DL (ref 70–110)
GLUCOSE UR QL STRIP: NEGATIVE
HCT VFR BLD AUTO: 31.1 % (ref 37–48.5)
HGB BLD-MCNC: 10.5 G/DL (ref 12–16)
HGB UR QL STRIP: NEGATIVE
IMM GRANULOCYTES # BLD AUTO: 0.02 K/UL (ref 0–0.04)
IMM GRANULOCYTES NFR BLD AUTO: 0.3 % (ref 0–0.5)
KETONES UR QL STRIP: NEGATIVE
LEUKOCYTE ESTERASE UR QL STRIP: ABNORMAL
LYMPHOCYTES # BLD AUTO: 1.1 K/UL (ref 1–4.8)
LYMPHOCYTES NFR BLD: 16.1 % (ref 18–48)
MCH RBC QN AUTO: 30.3 PG (ref 27–31)
MCHC RBC AUTO-ENTMCNC: 33.8 G/DL (ref 32–36)
MCV RBC AUTO: 90 FL (ref 82–98)
METHADONE UR QL SCN>300 NG/ML: NEGATIVE
MICROSCOPIC COMMENT: ABNORMAL
MONOCYTES # BLD AUTO: 0.5 K/UL (ref 0.3–1)
MONOCYTES NFR BLD: 6.9 % (ref 4–15)
NEUTROPHILS # BLD AUTO: 5.4 K/UL (ref 1.8–7.7)
NEUTROPHILS NFR BLD: 75.8 % (ref 38–73)
NITRITE UR QL STRIP: POSITIVE
NRBC BLD-RTO: 0 /100 WBC
OPIATES UR QL SCN: NEGATIVE
PCP UR QL SCN>25 NG/ML: NEGATIVE
PH UR STRIP: 6 [PH] (ref 5–8)
PLATELET # BLD AUTO: 169 K/UL (ref 150–450)
PMV BLD AUTO: 10.4 FL (ref 9.2–12.9)
POTASSIUM SERPL-SCNC: 3.4 MMOL/L (ref 3.5–5.1)
PROT SERPL-MCNC: 6.5 G/DL (ref 6–8.4)
PROT UR QL STRIP: NEGATIVE
RBC # BLD AUTO: 3.47 M/UL (ref 4–5.4)
RBC #/AREA URNS HPF: 4 /HPF (ref 0–4)
SARS-COV-2 RDRP RESP QL NAA+PROBE: NEGATIVE
SODIUM SERPL-SCNC: 141 MMOL/L (ref 136–145)
SP GR UR STRIP: 1.01 (ref 1–1.03)
SQUAMOUS #/AREA URNS HPF: 6 /HPF
TOXICOLOGY INFORMATION: ABNORMAL
TSH SERPL DL<=0.005 MIU/L-ACNC: 1.31 UIU/ML (ref 0.4–4)
URN SPEC COLLECT METH UR: ABNORMAL
UROBILINOGEN UR STRIP-ACNC: NEGATIVE EU/DL
WBC # BLD AUTO: 7.1 K/UL (ref 3.9–12.7)
WBC #/AREA URNS HPF: 31 /HPF (ref 0–5)

## 2021-12-26 PROCEDURE — 87088 URINE BACTERIA CULTURE: CPT | Performed by: EMERGENCY MEDICINE

## 2021-12-26 PROCEDURE — 12000002 HC ACUTE/MED SURGE SEMI-PRIVATE ROOM

## 2021-12-26 PROCEDURE — 96372 THER/PROPH/DIAG INJ SC/IM: CPT

## 2021-12-26 PROCEDURE — 82077 ASSAY SPEC XCP UR&BREATH IA: CPT | Performed by: EMERGENCY MEDICINE

## 2021-12-26 PROCEDURE — 86140 C-REACTIVE PROTEIN: CPT | Performed by: EMERGENCY MEDICINE

## 2021-12-26 PROCEDURE — 87077 CULTURE AEROBIC IDENTIFY: CPT | Performed by: EMERGENCY MEDICINE

## 2021-12-26 PROCEDURE — 85025 COMPLETE CBC W/AUTO DIFF WBC: CPT | Performed by: EMERGENCY MEDICINE

## 2021-12-26 PROCEDURE — 80143 DRUG ASSAY ACETAMINOPHEN: CPT | Performed by: EMERGENCY MEDICINE

## 2021-12-26 PROCEDURE — U0002 COVID-19 LAB TEST NON-CDC: HCPCS | Performed by: EMERGENCY MEDICINE

## 2021-12-26 PROCEDURE — 99285 EMERGENCY DEPT VISIT HI MDM: CPT | Mod: 25

## 2021-12-26 PROCEDURE — 63600175 PHARM REV CODE 636 W HCPCS: Performed by: EMERGENCY MEDICINE

## 2021-12-26 PROCEDURE — 25000003 PHARM REV CODE 250: Performed by: EMERGENCY MEDICINE

## 2021-12-26 PROCEDURE — 80053 COMPREHEN METABOLIC PANEL: CPT | Performed by: EMERGENCY MEDICINE

## 2021-12-26 PROCEDURE — 84443 ASSAY THYROID STIM HORMONE: CPT | Performed by: EMERGENCY MEDICINE

## 2021-12-26 PROCEDURE — 81000 URINALYSIS NONAUTO W/SCOPE: CPT | Mod: 59 | Performed by: EMERGENCY MEDICINE

## 2021-12-26 PROCEDURE — 81025 URINE PREGNANCY TEST: CPT | Performed by: EMERGENCY MEDICINE

## 2021-12-26 PROCEDURE — 80307 DRUG TEST PRSMV CHEM ANLYZR: CPT | Performed by: EMERGENCY MEDICINE

## 2021-12-26 PROCEDURE — 87086 URINE CULTURE/COLONY COUNT: CPT | Performed by: EMERGENCY MEDICINE

## 2021-12-26 PROCEDURE — 87186 SC STD MICRODIL/AGAR DIL: CPT | Performed by: EMERGENCY MEDICINE

## 2021-12-26 RX ORDER — DIPHENHYDRAMINE HYDROCHLORIDE 50 MG/ML
50 INJECTION INTRAMUSCULAR; INTRAVENOUS
Status: COMPLETED | OUTPATIENT
Start: 2021-12-26 | End: 2021-12-26

## 2021-12-26 RX ORDER — CEPHALEXIN 500 MG/1
500 CAPSULE ORAL EVERY 12 HOURS
Status: DISCONTINUED | OUTPATIENT
Start: 2021-12-26 | End: 2021-12-27

## 2021-12-26 RX ORDER — HALOPERIDOL 5 MG/ML
5 INJECTION INTRAMUSCULAR
Status: COMPLETED | OUTPATIENT
Start: 2021-12-26 | End: 2021-12-26

## 2021-12-26 RX ADMIN — LORAZEPAM 2 MG: 2 INJECTION INTRAMUSCULAR; INTRAVENOUS at 04:12

## 2021-12-26 RX ADMIN — CEPHALEXIN 500 MG: 500 CAPSULE ORAL at 09:12

## 2021-12-26 RX ADMIN — HALOPERIDOL LACTATE 5 MG: 5 INJECTION, SOLUTION INTRAMUSCULAR at 04:12

## 2021-12-26 RX ADMIN — DIPHENHYDRAMINE HYDROCHLORIDE 50 MG: 50 INJECTION INTRAMUSCULAR; INTRAVENOUS at 04:12

## 2021-12-26 NOTE — ED PROVIDER NOTES
Encounter Date: 2021       History     Chief Complaint   Patient presents with    Altered Mental Status     Pt presents to ED today via [facility] who reports family called for bizarre behavior, pt yelling, throwing items in home, and crawling under bed. EMS reports pt displaying aggressive behavior , kicking EMS , and pt arrived in four point restraints. Pt arrived talking in loud tone and using profanity. Dr. Oneal in hallway assessing pt.       Patient is a 36-year-old female brought in by EMS from home where family members reported the patient became combative with bizarre behavior.  No known prior psychiatric history.  She presents to the ED yelling and being uncooperative her.  This has required 4 point restraints.  Patient denies ETOH or illicit drug abuse.        Review of patient's allergies indicates:   Allergen Reactions    Sulfa (sulfonamide antibiotics) Shortness Of Breath and Swelling     Past Medical History:   Diagnosis Date    Allergy     Hydrocephalus     Hypertension      (ventriculoperitoneal) shunt status     not active currently     Past Surgical History:   Procedure Laterality Date     SECTION      VENTRICULOPERITONEAL SHUNT       Family History   Problem Relation Age of Onset    Diabetes Father     Breast cancer Neg Hx     Colon cancer Neg Hx     Ovarian cancer Neg Hx     Stroke Neg Hx      Social History     Tobacco Use    Smoking status: Former Smoker    Smokeless tobacco: Never Used   Substance Use Topics    Alcohol use: Yes     Comment: occ    Drug use: No     Review of Systems   Unable to perform ROS: Psychiatric disorder       Physical Exam     Initial Vitals [21 1728]   BP Pulse Resp Temp SpO2   (!) 146/89 98 15 97.9 °F (36.6 °C) 100 %      MAP       --         Physical Exam    Nursing note and vitals reviewed.  Constitutional: She appears distressed.   Combative and verbally abusive.   HENT:   Head: Atraumatic.   Eyes: EOM are normal.   Neck: Neck  supple.   Cardiovascular: Normal rate, regular rhythm and normal heart sounds.   Pulmonary/Chest: Breath sounds normal.   Abdominal: Abdomen is soft. She exhibits no distension. There is no abdominal tenderness.   Musculoskeletal:         General: Normal range of motion.      Cervical back: Neck supple.     Neurological:   No obvious focal deficits.   Skin: Skin is warm and dry.         ED Course   Procedures  Labs Reviewed   CBC W/ AUTO DIFFERENTIAL - Abnormal; Notable for the following components:       Result Value    RBC 3.47 (*)     Hemoglobin 10.5 (*)     Hematocrit 31.1 (*)     Gran % 75.8 (*)     Lymph % 16.1 (*)     All other components within normal limits   COMPREHENSIVE METABOLIC PANEL - Abnormal; Notable for the following components:    Potassium 3.4 (*)     CO2 18 (*)     All other components within normal limits   URINALYSIS, REFLEX TO URINE CULTURE - Abnormal; Notable for the following components:    Appearance, UA Hazy (*)     Nitrite, UA Positive (*)     Leukocytes, UA 3+ (*)     All other components within normal limits    Narrative:     Specimen Source->Urine   DRUG SCREEN PANEL, URINE EMERGENCY - Abnormal; Notable for the following components:    Benzodiazepines Presumptive Positive (*)     All other components within normal limits    Narrative:     Specimen Source->Urine   ACETAMINOPHEN LEVEL - Abnormal; Notable for the following components:    Acetaminophen (Tylenol), Serum <3.0 (*)     All other components within normal limits   URINALYSIS MICROSCOPIC - Abnormal; Notable for the following components:    WBC, UA 31 (*)     All other components within normal limits    Narrative:     Specimen Source->Urine   CULTURE, URINE   CULTURE, URINE   ALCOHOL,MEDICAL (ETHANOL)   TSH   SARS-COV-2 RNA AMPLIFICATION, QUAL   PREGNANCY TEST, URINE RAPID   SARS-COV-2 RDRP GENE          Imaging Results          CT Head Without Contrast (Final result)  Result time 12/26/21 17:57:26    Final result by Ina ANDREWS  MD Angelita (12/26/21 17:57:26)                 Impression:      No acute abnormality.    Developmental findings in the posterior fossa.    No acute hydrocephalus.      Electronically signed by: Ina Goldstein  Date:    12/26/2021  Time:    17:57             Narrative:    EXAMINATION:  CT HEAD WITHOUT CONTRAST    CLINICAL HISTORY:  Mental status change, unknown cause;    TECHNIQUE:  Low dose axial images were obtained through the head.  Coronal and sagittal reformations were also performed. Contrast was not administered.    COMPARISON:  CT brain 01/30/2019    FINDINGS:  Large posterior fossa cyst is noted with drainage catheter in place.  There is no acute abnormality involving the cerebral hemispheres.  Stable ventricles, cisterns and sulci as compared to prior exam.  No evidence of acute hydrocephalus.  There is intact gray-white matter junction differentiation.  No acute intracranial hemorrhage or hematoma.  There is no evidence herniation.  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  The imaged paranasal sinuses mastoid air cells are clear.  Orbits grossly appear intact and symmetric.  Bony calvarium grossly appears intact.                                 Medications   cephALEXin capsule 500 mg (has no administration in time range)   haloperidol lactate injection 5 mg (5 mg Intramuscular Given 12/26/21 1619)   diphenhydrAMINE injection 50 mg (50 mg Intramuscular Given 12/26/21 1619)   lorazepam (ATIVAN) injection 2 mg (2 mg Intramuscular Given 12/26/21 1619)     Medical Decision Making:   Initial Assessment:   36-year-old female with acute onset of psychotic behavior.  Clinical Tests:   Lab Tests: Ordered and Reviewed       <> Summary of Lab: Urinalysis shows evidence of a UTI with nitrites and WBC 31.  Radiological Study: Ordered and Reviewed  ED Management:  Patient will be medically cleared here in the emergency department and then transferred to the nearest available psychiatric  facility for further evaluation and treatment.                      Clinical Impression:   Final diagnoses:  [F29] Psychosis, unspecified psychosis type (Primary)  [N39.0] Urinary tract infection without hematuria, site unspecified                 Abdoulaye Wakefield MD  12/26/21 9982

## 2021-12-27 PROBLEM — N39.0 URINARY TRACT INFECTION WITHOUT HEMATURIA: Status: ACTIVE | Noted: 2021-12-27

## 2021-12-27 PROBLEM — N39.0 UTI (URINARY TRACT INFECTION): Status: ACTIVE | Noted: 2021-12-27

## 2021-12-27 PROBLEM — G93.40 ACUTE ENCEPHALOPATHY: Status: ACTIVE | Noted: 2021-12-27

## 2021-12-27 PROBLEM — F29 PSYCHOSIS: Status: RESOLVED | Noted: 2021-12-27 | Resolved: 2021-12-27

## 2021-12-27 PROBLEM — N39.0 UTI (URINARY TRACT INFECTION): Status: RESOLVED | Noted: 2021-12-27 | Resolved: 2021-12-27

## 2021-12-27 PROBLEM — F41.9 ANXIETY: Chronic | Status: ACTIVE | Noted: 2021-12-27

## 2021-12-27 PROBLEM — Q03.1: Chronic | Status: ACTIVE | Noted: 2021-12-27

## 2021-12-27 PROBLEM — F29 PSYCHOSIS: Status: ACTIVE | Noted: 2021-12-27

## 2021-12-27 PROBLEM — M35.06: Chronic | Status: ACTIVE | Noted: 2021-12-27

## 2021-12-27 LAB
ALBUMIN SERPL BCP-MCNC: 3.6 G/DL (ref 3.5–5.2)
ALP SERPL-CCNC: 63 U/L (ref 55–135)
ALT SERPL W/O P-5'-P-CCNC: 21 U/L (ref 10–44)
AMMONIA PLAS-SCNC: 27 UMOL/L (ref 10–50)
ANION GAP SERPL CALC-SCNC: 9 MMOL/L (ref 8–16)
AST SERPL-CCNC: 26 U/L (ref 10–40)
BASOPHILS # BLD AUTO: 0.02 K/UL (ref 0–0.2)
BASOPHILS NFR BLD: 0.4 % (ref 0–1.9)
BILIRUB SERPL-MCNC: 0.3 MG/DL (ref 0.1–1)
BUN SERPL-MCNC: 16 MG/DL (ref 6–20)
CALCIUM SERPL-MCNC: 8.6 MG/DL (ref 8.7–10.5)
CHLORIDE SERPL-SCNC: 110 MMOL/L (ref 95–110)
CO2 SERPL-SCNC: 22 MMOL/L (ref 23–29)
CREAT SERPL-MCNC: 0.8 MG/DL (ref 0.5–1.4)
CRP SERPL-MCNC: 4.6 MG/L (ref 0–8.2)
DIFFERENTIAL METHOD: ABNORMAL
EOSINOPHIL # BLD AUTO: 0.1 K/UL (ref 0–0.5)
EOSINOPHIL NFR BLD: 1.8 % (ref 0–8)
ERYTHROCYTE [DISTWIDTH] IN BLOOD BY AUTOMATED COUNT: 13.7 % (ref 11.5–14.5)
EST. GFR  (AFRICAN AMERICAN): >60 ML/MIN/1.73 M^2
EST. GFR  (NON AFRICAN AMERICAN): >60 ML/MIN/1.73 M^2
FOLATE SERPL-MCNC: 11.2 NG/ML (ref 4–24)
GLUCOSE SERPL-MCNC: 87 MG/DL (ref 70–110)
HCT VFR BLD AUTO: 31.5 % (ref 37–48.5)
HGB BLD-MCNC: 10.5 G/DL (ref 12–16)
IMM GRANULOCYTES # BLD AUTO: 0.01 K/UL (ref 0–0.04)
IMM GRANULOCYTES NFR BLD AUTO: 0.2 % (ref 0–0.5)
LYMPHOCYTES # BLD AUTO: 1.7 K/UL (ref 1–4.8)
LYMPHOCYTES NFR BLD: 30.1 % (ref 18–48)
MAGNESIUM SERPL-MCNC: 2.1 MG/DL (ref 1.6–2.6)
MCH RBC QN AUTO: 30.2 PG (ref 27–31)
MCHC RBC AUTO-ENTMCNC: 33.3 G/DL (ref 32–36)
MCV RBC AUTO: 91 FL (ref 82–98)
MONOCYTES # BLD AUTO: 0.5 K/UL (ref 0.3–1)
MONOCYTES NFR BLD: 8.6 % (ref 4–15)
NEUTROPHILS # BLD AUTO: 3.2 K/UL (ref 1.8–7.7)
NEUTROPHILS NFR BLD: 58.9 % (ref 38–73)
NRBC BLD-RTO: 0 /100 WBC
PHOSPHATE SERPL-MCNC: 3.5 MG/DL (ref 2.7–4.5)
PLATELET # BLD AUTO: 192 K/UL (ref 150–450)
PMV BLD AUTO: 10.4 FL (ref 9.2–12.9)
POTASSIUM SERPL-SCNC: 3.6 MMOL/L (ref 3.5–5.1)
PROT SERPL-MCNC: 5.9 G/DL (ref 6–8.4)
RBC # BLD AUTO: 3.48 M/UL (ref 4–5.4)
SODIUM SERPL-SCNC: 141 MMOL/L (ref 136–145)
TSH SERPL DL<=0.005 MIU/L-ACNC: 2.1 UIU/ML (ref 0.4–4)
VIT B12 SERPL-MCNC: 637 PG/ML (ref 210–950)
WBC # BLD AUTO: 5.49 K/UL (ref 3.9–12.7)

## 2021-12-27 PROCEDURE — 85025 COMPLETE CBC W/AUTO DIFF WBC: CPT | Performed by: NURSE PRACTITIONER

## 2021-12-27 PROCEDURE — 11000001 HC ACUTE MED/SURG PRIVATE ROOM

## 2021-12-27 PROCEDURE — 25000003 PHARM REV CODE 250: Performed by: EMERGENCY MEDICINE

## 2021-12-27 PROCEDURE — 97165 OT EVAL LOW COMPLEX 30 MIN: CPT

## 2021-12-27 PROCEDURE — 82607 VITAMIN B-12: CPT | Performed by: INTERNAL MEDICINE

## 2021-12-27 PROCEDURE — 86592 SYPHILIS TEST NON-TREP QUAL: CPT | Performed by: INTERNAL MEDICINE

## 2021-12-27 PROCEDURE — 25000003 PHARM REV CODE 250: Performed by: INTERNAL MEDICINE

## 2021-12-27 PROCEDURE — 84100 ASSAY OF PHOSPHORUS: CPT | Performed by: NURSE PRACTITIONER

## 2021-12-27 PROCEDURE — 82140 ASSAY OF AMMONIA: CPT | Performed by: INTERNAL MEDICINE

## 2021-12-27 PROCEDURE — 82746 ASSAY OF FOLIC ACID SERUM: CPT | Performed by: INTERNAL MEDICINE

## 2021-12-27 PROCEDURE — 84443 ASSAY THYROID STIM HORMONE: CPT | Performed by: NURSE PRACTITIONER

## 2021-12-27 PROCEDURE — 36415 COLL VENOUS BLD VENIPUNCTURE: CPT | Performed by: INTERNAL MEDICINE

## 2021-12-27 PROCEDURE — 80053 COMPREHEN METABOLIC PANEL: CPT | Performed by: NURSE PRACTITIONER

## 2021-12-27 PROCEDURE — 63600175 PHARM REV CODE 636 W HCPCS: Performed by: INTERNAL MEDICINE

## 2021-12-27 PROCEDURE — 86235 NUCLEAR ANTIGEN ANTIBODY: CPT | Mod: 59 | Performed by: INTERNAL MEDICINE

## 2021-12-27 PROCEDURE — 83735 ASSAY OF MAGNESIUM: CPT | Performed by: NURSE PRACTITIONER

## 2021-12-27 PROCEDURE — 86235 NUCLEAR ANTIGEN ANTIBODY: CPT | Performed by: INTERNAL MEDICINE

## 2021-12-27 PROCEDURE — G0378 HOSPITAL OBSERVATION PER HR: HCPCS

## 2021-12-27 PROCEDURE — 97530 THERAPEUTIC ACTIVITIES: CPT

## 2021-12-27 PROCEDURE — 25000003 PHARM REV CODE 250: Performed by: NURSE PRACTITIONER

## 2021-12-27 RX ORDER — SERTRALINE HYDROCHLORIDE 100 MG/1
100 TABLET, FILM COATED ORAL NIGHTLY
Status: DISCONTINUED | OUTPATIENT
Start: 2021-12-27 | End: 2021-12-30 | Stop reason: HOSPADM

## 2021-12-27 RX ORDER — GABAPENTIN 300 MG/1
300 CAPSULE ORAL 3 TIMES DAILY
Status: DISCONTINUED | OUTPATIENT
Start: 2021-12-27 | End: 2021-12-30 | Stop reason: HOSPADM

## 2021-12-27 RX ORDER — IBUPROFEN 600 MG/1
600 TABLET ORAL EVERY 6 HOURS PRN
Status: DISCONTINUED | OUTPATIENT
Start: 2021-12-27 | End: 2021-12-30 | Stop reason: HOSPADM

## 2021-12-27 RX ORDER — SERTRALINE HYDROCHLORIDE 100 MG/1
TABLET, FILM COATED ORAL
Status: ON HOLD | COMMUNITY
Start: 2021-08-24 | End: 2022-02-06 | Stop reason: SDUPTHER

## 2021-12-27 RX ORDER — SODIUM CHLORIDE 9 MG/ML
INJECTION, SOLUTION INTRAVENOUS
Status: DISCONTINUED | OUTPATIENT
Start: 2021-12-27 | End: 2021-12-30 | Stop reason: HOSPADM

## 2021-12-27 RX ORDER — HYDROXYZINE 50 MG/ML
25 INJECTION, SOLUTION INTRAMUSCULAR ONCE
Status: COMPLETED | OUTPATIENT
Start: 2021-12-27 | End: 2021-12-27

## 2021-12-27 RX ORDER — HALOPERIDOL 5 MG/ML
5 INJECTION INTRAMUSCULAR EVERY 6 HOURS PRN
Status: DISCONTINUED | OUTPATIENT
Start: 2021-12-27 | End: 2021-12-30 | Stop reason: HOSPADM

## 2021-12-27 RX ORDER — HALOPERIDOL 5 MG/ML
5 INJECTION INTRAMUSCULAR ONCE
Status: COMPLETED | OUTPATIENT
Start: 2021-12-27 | End: 2021-12-27

## 2021-12-27 RX ORDER — MECLIZINE HYDROCHLORIDE 25 MG/1
25 TABLET ORAL 3 TIMES DAILY PRN
Status: DISCONTINUED | OUTPATIENT
Start: 2021-12-27 | End: 2021-12-30 | Stop reason: HOSPADM

## 2021-12-27 RX ORDER — LANOLIN ALCOHOL/MO/W.PET/CERES
1 CREAM (GRAM) TOPICAL DAILY
Status: DISCONTINUED | OUTPATIENT
Start: 2021-12-27 | End: 2021-12-30 | Stop reason: HOSPADM

## 2021-12-27 RX ORDER — ONDANSETRON 8 MG/1
8 TABLET, ORALLY DISINTEGRATING ORAL EVERY 8 HOURS PRN
Status: DISCONTINUED | OUTPATIENT
Start: 2021-12-27 | End: 2021-12-30 | Stop reason: HOSPADM

## 2021-12-27 RX ORDER — ACETAMINOPHEN 325 MG/1
650 TABLET ORAL EVERY 8 HOURS PRN
Status: DISCONTINUED | OUTPATIENT
Start: 2021-12-27 | End: 2021-12-30 | Stop reason: HOSPADM

## 2021-12-27 RX ORDER — SODIUM CHLORIDE 0.9 % (FLUSH) 0.9 %
10 SYRINGE (ML) INJECTION EVERY 8 HOURS PRN
Status: DISCONTINUED | OUTPATIENT
Start: 2021-12-27 | End: 2021-12-30 | Stop reason: HOSPADM

## 2021-12-27 RX ORDER — HYDROXYZINE PAMOATE 25 MG/1
25 CAPSULE ORAL EVERY 8 HOURS PRN
Status: DISCONTINUED | OUTPATIENT
Start: 2021-12-27 | End: 2021-12-30 | Stop reason: HOSPADM

## 2021-12-27 RX ORDER — AMOXICILLIN 250 MG
1 CAPSULE ORAL DAILY PRN
Status: DISCONTINUED | OUTPATIENT
Start: 2021-12-27 | End: 2021-12-30 | Stop reason: HOSPADM

## 2021-12-27 RX ORDER — ONDANSETRON 2 MG/ML
4 INJECTION INTRAMUSCULAR; INTRAVENOUS EVERY 8 HOURS PRN
Status: DISCONTINUED | OUTPATIENT
Start: 2021-12-27 | End: 2021-12-30 | Stop reason: HOSPADM

## 2021-12-27 RX ORDER — NALOXONE HCL 0.4 MG/ML
0.02 VIAL (ML) INJECTION
Status: DISCONTINUED | OUTPATIENT
Start: 2021-12-27 | End: 2021-12-30 | Stop reason: HOSPADM

## 2021-12-27 RX ORDER — TALC
6 POWDER (GRAM) TOPICAL NIGHTLY PRN
Status: DISCONTINUED | OUTPATIENT
Start: 2021-12-27 | End: 2021-12-30 | Stop reason: HOSPADM

## 2021-12-27 RX ADMIN — HALOPERIDOL LACTATE 5 MG: 5 INJECTION, SOLUTION INTRAMUSCULAR at 04:12

## 2021-12-27 RX ADMIN — CEFTRIAXONE 1 G: 1 INJECTION, SOLUTION INTRAVENOUS at 02:12

## 2021-12-27 RX ADMIN — GABAPENTIN 300 MG: 300 CAPSULE ORAL at 10:12

## 2021-12-27 RX ADMIN — FERROUS SULFATE TAB 325 MG (65 MG ELEMENTAL FE) 1 EACH: 325 (65 FE) TAB at 08:12

## 2021-12-27 RX ADMIN — ACETAMINOPHEN 650 MG: 325 TABLET ORAL at 08:12

## 2021-12-27 RX ADMIN — ACETAMINOPHEN 650 MG: 325 TABLET ORAL at 03:12

## 2021-12-27 RX ADMIN — HYDROXYZINE HYDROCHLORIDE 25 MG: 50 INJECTION, SOLUTION INTRAMUSCULAR at 02:12

## 2021-12-27 RX ADMIN — GABAPENTIN 300 MG: 300 CAPSULE ORAL at 03:12

## 2021-12-27 RX ADMIN — SERTRALINE 100 MG: 100 TABLET, FILM COATED ORAL at 10:12

## 2021-12-27 RX ADMIN — HALOPERIDOL LACTATE 5 MG: 5 INJECTION, SOLUTION INTRAMUSCULAR at 05:12

## 2021-12-27 RX ADMIN — CEPHALEXIN 500 MG: 500 CAPSULE ORAL at 08:12

## 2021-12-27 NOTE — PLAN OF CARE
VN cued into room to complete admit assessment. VIP model introduced; VN working alongside bedside treatment team.  Plan of care reviewed with patient. Patient informed of fall risk, fall precautions, call light within reach, side rails x2 elevated. Patient notified to ask staff for assistance. Patient verbalized complete understanding. Time allowed for questions. Will continue to monitor and intervene as needed.    12/27/21 0522   Admission   Initial VN Admission Questions Complete   Communication Issues? Technical Issue   Shift   Virtual Nurse - Rounding Complete   Pain Management Interventions relaxation techniques promoted;quiet environment facilitated   Virtual Nurse - Patient Verbalized Approval Of Camera Use;VN Rounding   Type of Frequent Check   Type Patient Rounds   Safety/Activity   Patient Rounds bed in low position;ID band on;placement of personal items at bedside;bed wheels locked;call light in patient/parent reach;clutter free environment maintained;visualized patient   Safety Promotion/Fall Prevention assistive device/personal item within reach;bed alarm set;side rails raised x 2;nonskid shoes/socks when out of bed;instructed to call staff for mobility   Safety Precautions emergency equipment at bedside   Activity Management Rolling - L1   Positioning   Body Position position changed independently   Head of Bed (HOB) Positioning HOB elevated

## 2021-12-27 NOTE — ED NOTES
Valorie at  's office notified of PEC rescinded by  per recommendation of Dr.Grace Man, psychiatry

## 2021-12-27 NOTE — ASSESSMENT & PLAN NOTE
Presented combative with altered mentation. CT head neg. COVID19 neg. UDS + benzo. Urine with + nitrites and 3+ leukocytes. Await urine culture.    Will consult psych for eval for multiple prior episodes of catatonia and AMS  AA0x4 as of now  Continue Keflex for uti

## 2021-12-27 NOTE — NURSING
"Patient noted to be screaming "they hung up on me". Staff went to see patient and started to grab and pull nurses. Therapeutic communication done. Dr Lopez called and put in  Order for 1x dose of hydroxyzine 25mg IM.  "

## 2021-12-27 NOTE — ASSESSMENT & PLAN NOTE
Recently diagnosed early part of 2021  Unable to ambulate without walker and motorized wheelchair  Consult PT/OT  Would like PT for home upon discharge

## 2021-12-27 NOTE — SUBJECTIVE & OBJECTIVE
Past Medical History:   Diagnosis Date    Allergy     Anxiety     Hydrocephalus     Hypertension      (ventriculoperitoneal) shunt status     not active currently       Past Surgical History:   Procedure Laterality Date     SECTION      VENTRICULOPERITONEAL SHUNT         Review of patient's allergies indicates:   Allergen Reactions    Sulfa (sulfonamide antibiotics) Shortness Of Breath and Swelling       No current facility-administered medications on file prior to encounter.     Current Outpatient Medications on File Prior to Encounter   Medication Sig    b complex vitamins tablet Take 1 tablet by mouth once daily.    sertraline (ZOLOFT) 100 MG tablet 1 tablet EVERY PM (route: oral)    [DISCONTINUED] escitalopram oxalate (LEXAPRO) 20 MG tablet Take 20 mg by mouth once daily.    famotidine (PEPCID) 20 MG tablet Take 1 tablet (20 mg total) by mouth 2 (two) times daily.    ferrous sulfate 325 mg (65 mg iron) Tab tablet Take 325 mg by mouth daily with breakfast.    meclizine (ANTIVERT) 25 mg tablet Take 1 tablet (25 mg total) by mouth 3 (three) times daily as needed for Dizziness.    ondansetron (ZOFRAN-ODT) 4 MG TbDL Take 1 tablet (4 mg total) by mouth every 6 (six) hours as needed (Nausea vomiting).    promethazine (PHENERGAN) 25 MG tablet Take 1 tablet (25 mg total) by mouth every 6 (six) hours as needed for Nausea.    [DISCONTINUED] hydrOXYzine HCl (ATARAX) 25 MG tablet Take 25 mg by mouth 3 (three) times daily as needed for Itching.    [DISCONTINUED] hydrOXYzine HCL (ATARAX) 25 MG tablet Take 2 tablets (50 mg total) by mouth 3 (three) times daily as needed for Anxiety.    [DISCONTINUED] hydrOXYzine pamoate (VISTARIL) 25 MG Cap Take 1 capsule (25 mg total) by mouth 4 (four) times daily.    [DISCONTINUED] propranolol (INDERAL) 40 MG tablet Take 40 mg by mouth 3 (three) times daily.    [DISCONTINUED] sucralfate (CARAFATE) 100 mg/mL suspension Take 10 mLs (1 g total) by mouth 4 (four)  times daily.     Family History     Problem Relation (Age of Onset)    Diabetes Father    Heart disease Father    Hypertension Mother    Mental illness Father        Tobacco Use    Smoking status: Former Smoker    Smokeless tobacco: Never Used   Substance and Sexual Activity    Alcohol use: Yes     Comment: occ    Drug use: No    Sexual activity: Yes     Partners: Male     Birth control/protection: None     Review of Systems   Constitutional: Negative for chills, fever and unexpected weight change.   HENT: Negative for ear pain and sore throat.    Eyes: Negative for pain and discharge.   Respiratory: Negative for cough, shortness of breath and wheezing.    Cardiovascular: Negative for chest pain, palpitations and leg swelling.   Gastrointestinal: Negative for abdominal distention, abdominal pain, blood in stool, diarrhea, nausea and vomiting.   Endocrine: Negative for polydipsia, polyphagia and polyuria.   Genitourinary: Negative for difficulty urinating, dysuria and flank pain.   Musculoskeletal: Positive for arthralgias and myalgias. Negative for gait problem and neck stiffness.   Skin: Negative for color change and wound.   Neurological: Positive for weakness. Negative for dizziness, syncope and speech difficulty.   Hematological: Negative for adenopathy.   Psychiatric/Behavioral: Positive for confusion. Negative for suicidal ideas.     Objective:     Vital Signs (Most Recent):  Temp: 97.9 °F (36.6 °C) (12/27/21 0750)  Pulse: 100 (12/27/21 0750)  Resp: 18 (12/27/21 0350)  BP: 136/80 (12/27/21 0750)  SpO2: 100 % (12/27/21 0750) Vital Signs (24h Range):  Temp:  [96.5 °F (35.8 °C)-98.5 °F (36.9 °C)] 97.9 °F (36.6 °C)  Pulse:  [] 100  Resp:  [15-20] 18  SpO2:  [99 %-100 %] 100 %  BP: (126-146)/(74-90) 136/80        Body mass index is 24.89 kg/m².    Physical Exam  Vitals and nursing note reviewed.   Constitutional:       General: She is not in acute distress.     Appearance: Normal appearance.   HENT:       Head: Atraumatic.      Right Ear: External ear normal.      Left Ear: External ear normal.      Nose: Nose normal. No congestion.      Mouth/Throat:      Mouth: Mucous membranes are moist.      Pharynx: Oropharynx is clear.   Eyes:      Extraocular Movements: Extraocular movements intact.      Pupils: Pupils are equal, round, and reactive to light.   Cardiovascular:      Rate and Rhythm: Normal rate and regular rhythm.      Pulses: Normal pulses.      Heart sounds: Normal heart sounds.   Pulmonary:      Effort: Pulmonary effort is normal.      Breath sounds: Normal breath sounds.   Abdominal:      General: Bowel sounds are normal.      Palpations: Abdomen is soft.   Musculoskeletal:         General: Normal range of motion.      Cervical back: Normal range of motion. No rigidity.      Right lower leg: No edema.      Left lower leg: No edema.   Skin:     General: Skin is warm and dry.      Capillary Refill: Capillary refill takes less than 2 seconds.   Neurological:      General: No focal deficit present.      Mental Status: She is alert. Mental status is at baseline.      Motor: Weakness present.      Coordination: Impaired rapid alternating movements.      Gait: Gait abnormal.   Psychiatric:         Mood and Affect: Mood normal.         Behavior: Behavior normal.         Cognition and Memory: She exhibits impaired recent memory.           CRANIAL NERVES     CN III, IV, VI   Pupils are equal, round, and reactive to light.       Significant Labs:   CBC:   Recent Labs   Lab 12/26/21  1710 12/27/21  0256   WBC 7.10 5.49   HGB 10.5* 10.5*   HCT 31.1* 31.5*    192     CMP:   Recent Labs   Lab 12/26/21  1710 12/27/21  0256    141   K 3.4* 3.6    110   CO2 18* 22*   GLU 87 87   BUN 17 16   CREATININE 0.9 0.8   CALCIUM 8.7 8.6*   PROT 6.5 5.9*   ALBUMIN 4.0 3.6   BILITOT 0.3 0.3   ALKPHOS 65 63   AST 30 26   ALT 22 21   ANIONGAP 14 9   EGFRNONAA >60 >60     Lactic Acid: No results for input(s): LACTATE in  the last 48 hours.  Troponin: No results for input(s): TROPONINI in the last 48 hours.  TSH:   Recent Labs   Lab 12/27/21  0256   TSH 2.102     Urine Studies:   Recent Labs   Lab 12/26/21  1711   COLORU Yellow   APPEARANCEUA Hazy*   PHUR 6.0   SPECGRAV 1.015   PROTEINUA Negative   GLUCUA Negative   KETONESU Negative   BILIRUBINUA Negative   OCCULTUA Negative   NITRITE Positive*   UROBILINOGEN Negative   LEUKOCYTESUR 3+*   RBCUA 4   WBCUA 31*   BACTERIA Rare   SQUAMEPITHEL 6       Significant Imaging: I have reviewed all pertinent imaging results/findings within the past 24 hours.

## 2021-12-27 NOTE — SUBJECTIVE & OBJECTIVE
Interval History: Patient appears comfortable in bed, admitted overnight with psychosis, off PEC, mentation improved, AAOx3, UA dirty on abxm seen by telepscyh, according to her she hasnt been taking any benzo and has been unable to fill it outpatient, urine cx pending    Review of Systems   Constitutional: Negative for chills and fever.   Respiratory: Negative for shortness of breath.    Cardiovascular: Negative for chest pain and leg swelling.   Gastrointestinal: Negative for abdominal pain, diarrhea, nausea and vomiting.   Genitourinary: Negative for dysuria.   Musculoskeletal: Negative for neck stiffness.   Neurological: Positive for weakness.   Psychiatric/Behavioral: Negative for confusion and suicidal ideas.     Objective:     Vital Signs (Most Recent):  Temp: 98.2 °F (36.8 °C) (12/27/21 1156)  Pulse: 105 (12/27/21 1156)  Resp: 18 (12/27/21 1156)  BP: (!) 144/91 (12/27/21 1156)  SpO2: 98 % (12/27/21 1156) Vital Signs (24h Range):  Temp:  [96.5 °F (35.8 °C)-98.5 °F (36.9 °C)] 98.2 °F (36.8 °C)  Pulse:  [] 105  Resp:  [15-20] 18  SpO2:  [98 %-100 %] 98 %  BP: (126-146)/(74-91) 144/91        Body mass index is 24.89 kg/m².  No intake or output data in the 24 hours ending 12/27/21 1333   Physical Exam    Significant Labs:   All pertinent labs within the past 24 hours have been reviewed.  Blood Culture: No results for input(s): LABBLOO in the last 48 hours.  BMP:   Recent Labs   Lab 12/27/21  0256   GLU 87      K 3.6      CO2 22*   BUN 16   CREATININE 0.8   CALCIUM 8.6*   MG 2.1     CBC:   Recent Labs   Lab 12/26/21  1710 12/27/21  0256   WBC 7.10 5.49   HGB 10.5* 10.5*   HCT 31.1* 31.5*    192     CMP:   Recent Labs   Lab 12/26/21  1710 12/27/21  0256    141   K 3.4* 3.6    110   CO2 18* 22*   GLU 87 87   BUN 17 16   CREATININE 0.9 0.8   CALCIUM 8.7 8.6*   PROT 6.5 5.9*   ALBUMIN 4.0 3.6   BILITOT 0.3 0.3   ALKPHOS 65 63   AST 30 26   ALT 22 21   ANIONGAP 14 9   EGFRNONAA >60  >60     Cardiac Markers: No results for input(s): CKMB, MYOGLOBIN, BNP, TROPISTAT in the last 48 hours.  Coagulation: No results for input(s): PT, INR, APTT in the last 48 hours.  Lactic Acid: No results for input(s): LACTATE in the last 48 hours.  Troponin: No results for input(s): TROPONINI in the last 48 hours.  TSH:   Recent Labs   Lab 12/27/21  0256   TSH 2.102       Significant Imaging: I have reviewed all pertinent imaging results/findings within the past 24 hours.

## 2021-12-27 NOTE — PT/OT/SLP EVAL
Occupational Therapy   Evaluation and Discharge Note    Name: Lore Johnson  MRN: [id number]  Admitting Diagnosis:  Acute encephalopathy   Recent Surgery: * No surgery found *      Recommendations:     Discharge Recommendations: outpatient OT,outpatient PT  Discharge Equipment Recommendations:  none  Barriers to discharge:  None    Assessment:     Lore Johnson is a 36 y.o. female with a medical diagnosis of Acute encephalopathy. At this time, patient is functioning at their prior level of function and does not require further acute OT services.     Plan:     During this hospitalization, patient does not require further acute OT services.  Please re-consult if situation changes.    · Plan of Care Reviewed with: patient    Subjective     Chief Complaint: generalized joint pain  Patient/Family Comments/goals: decreased pain, increased function    Occupational Profile:  Living Environment: Lives w/parents and 6 yo dtr in [facility], no MAYKEL, T/S w/TTB  Previous level of function: cared for basic needs, assisted daughter w/basic needs/homework, parents do driving and IADLs  Roles and Routines: reads a lot, caregiver to dtr  Equipment Used at home:  bath bench,cane, quad,grab bar,power chair,wheelchair,walker, rolling,slide board  Assistance upon Discharge: parents and dtr    Pain/Comfort:  · Pain Rating 1: 8/10  · Location - Orientation 1: generalized  · Location 1:  (joint pain; worse in distal extremities)  · Pain Rating Post-Intervention 1: 8/10    Patients cultural, spiritual, Oriental orthodox conflicts given the current situation: no    Objective:     Communicated with: nurse prior to session.  Patient found HOB elevated with bed alarm,peripheral IV upon OT entry to room.    General Precautions: Standard, fall   Orthopedic Precautions:    Braces:    Respiratory Status: Room air     Occupational Performance:    Bed Mobility:    · Patient completed Rolling/Turning to Left with  modified independence  · Patient completed  Scooting/Bridging with modified independence  · Patient completed Supine to Sit with modified independence  · Patient completed Sit to Supine with modified independence    Functional Mobility/Transfers:  · Patient completed Sit <> Stand Transfer with modified independence and supervision  with  rolling walker and without AD   · Patient completed Toilet Transfer Step Transfer technique with stand by assistance with  no AD and supervision w/RW back to bed  · Functional Mobility: SBA without AD, supervision w/RW    Activities of Daily Living:  · Feeding:  modified independence    · Lower Body Dressing: supervision    · Toileting: supervision      Cognitive/Visual Perceptual:  AO4    Physical Exam:  BUE AROM grossly WFL  BUE strength grossly 3+/5  Good sit balance, fair+ stand balance    AMPAC 6 Click ADL:  AMPAC Total Score: 24    Treatment & Education:  Pt educated on role of OT/POC, post acute recs, general AROM ex  Education:    Patient left HOB elevated with all lines intact, call button in reach, bed alarm on and nurse notified    GOALS:   Multidisciplinary Problems     Occupational Therapy Goals     Not on file          Multidisciplinary Problems (Resolved)        Problem: Occupational Therapy Goal    Goal Priority Disciplines Outcome Interventions   Occupational Therapy Goal   (Resolved)     OT, PT/OT Met                    History:     Past Medical History:   Diagnosis Date    Allergy     Anxiety     Hydrocephalus     Hypertension      (ventriculoperitoneal) shunt status     not active currently       Past Surgical History:   Procedure Laterality Date     SECTION      VENTRICULOPERITONEAL SHUNT         Time Tracking:     OT Date of Treatment: 21  OT Start Time: 825  OT Stop Time: 853  OT Total Time (min): 28 min    Billable Minutes:Evaluation 15  Therapeutic Activity 13    2021

## 2021-12-27 NOTE — PLAN OF CARE
OT eval performed, report to follow    Pt appears to be close to baseline fxn.  Rec OP OT /PT to address joint pain/stiffness, assessment of splinting    No acute needs identified    Problem: Occupational Therapy Goal  Goal: Occupational Therapy Goal  Outcome: Met

## 2021-12-27 NOTE — ED NOTES
Pt sitting up in stretcher, calm, cooperative. Oriented to person, place, and situation. Pt reports that she remembers feeling very confused earlier. Denies feeling anxious or angry. Pt was informed of + UTI. Pt reports that she has had episodes of confusion and violence when she had bladder infections. Provided with a meal. Pt tolerated a full plate of red beans and rice with 3 juices.

## 2021-12-27 NOTE — PLAN OF CARE
TN Met with pt - pt is awake but only cries  aloud - will not respond to questions or commands.      TN called and spoke with pt's mother Clementine Reyna  [phone] - she is pt's primary caregiver --- pt gets to apts per Cornelius méndez -- no other services - TN gave mother phone # for the [facility] (aides).       pcp - Dr. Bonny Lacey     per mother :  pt has an apt Wed 12/29 for a 6 hr IV infusion.       pmh:  Sjogren's syndrome - now with UTI, AMS         12/27/21 1718   Discharge Planning   Assessment Type Discharge Planning Brief Assessment   Resource/Environmental Concerns none   Support Systems Parent   Equipment Currently Used at Home wheelchair;walker, rolling;other (see comments)  (motorized WC)   Current Living Arrangements home/apartment/condo   Patient/Family Anticipates Transition to home;home with family   Patient/Family Anticipated Services at Transition home health care  (pt has [facility])   DME Needed Upon Discharge    (tbd)   Discharge Plan A Home;Home with family

## 2021-12-27 NOTE — ED NOTES
Report received. Care assumed. Pt sleeping, aroused easily. Cooperative. Denies needs or pain at this time. Will continue to monitor. Sitter at bedside.

## 2021-12-27 NOTE — ASSESSMENT & PLAN NOTE
Presented in the past with UTI related acute encephalopathy. Latest culture in chart grew E. Coli. Given IV Rocephin in Continue ceftriaxone  Await culture

## 2021-12-27 NOTE — PROVIDER PROGRESS NOTES - EMERGENCY DEPT.
Encounter Date: 12/26/2021    ED Physician Progress Notes        Physician Note:   Received sign-out from Dr. Harris.  Plan was follow-up await tele psych assessment.  Resting in bed no acute distress no further episodes of agitation.  We discussed with tele psych.  Seen evaluated by tele psych.  Multifactorial reasons for acute encephalopathy.  Recommended admission to Medicine for further evaluation.  Will plan admission.  Patient accepted  By [facility] medicine.

## 2021-12-27 NOTE — HPI
Ms. Johnson is a 36-year-old female with a medical history that includes anxiety, hydrocephalus s/p noncommunicating  shunt and hypertension. She was brought to the ED via EMS from home after family reported that the patient became combative with bizarre behavior and yelling. She arrived in 4 point restraints. The family denies any prior psychiatric history. On arrival to the ED she was found to be slightly hypertensive 's. Initial labs were remarkable for K 3.4, + benzo UDS and + nitrites in urine with 3+ leukocytes. A CT of the head was negative, COVID19 neg and ETOH neg. In the ED she was given IV Benadryl, Haldol, Ativan and Rocephin. She was initially placed under PEC with tele-psych consulted but they recommended medical evaluation for cause of psychosis. PEC was rescinded. Upon my assessment she was now AAOx4. She states she remembers being altered and was with a similar episode in the past. She was admitted in august of 2021 to [facility] with similar symptoms and treated with abx for a UTI as well as a valium taper for catatonia with subsequent improvement in her mental status. Her EEG was noted to be without seizure activity at that time and her CTH and MRI brain were without acute intracranial abnormalities. She represented to [place] last week with similar presentation and placed on Valium taper but has been unable to obtain Valium from pharmacy since discharge. Previous documented urine culture grew E. Coli sensitive to Rocephin. She will admit to [facility] medicine service for continued monitoring and treatment.

## 2021-12-27 NOTE — CONSULTS
[facility]  Psychiatry  Telepsychiatry Consult Note    Please see previous notes:    Patient agreeable to consultation via telepsychiatry.    Tele-Consultation from Psychiatry started: 12/27/2021 at  1135pm CST    The chief complaint leading to psychiatric consultation is:   (at arrival  Behaviors)    This consultation was requested by  the Emergency Department attending physician.  The location of the consulting psychiatrist is [place]    .  The patient location is  [facility] EMERGENCY DEPARTMENT   The patient arrived at the ED at: 12/26/21    Also present with the patient at the time of the consultation: n/a     Patient Identification:   Lore Johnson is a 36 y.o. female.      Consults  Subjective:     History of Present Illness:  No notes on file     ID 37 yo female who came to [place] ER via EMS on 12/26/21 for evaluation of behaviors     Reason for consultation: treatment recs     History of Present Illness/Problems:  per ER note  pts family called 911 due to patients behaviors  yelling, throwing items, crawling under bed  on EMS arrival to home > pt was aggressive, kicking  arrived to ER in 4pt restraints, profanity  no prior psych hx    1619: Haldol benadryl Ativan    1150pm update  pt sitting up on stretcher  oriented to person, place, situation  denies  feeling anxious, angry  informed of UTI  > past episodes of confusion, violent with UTI      =================================  Telepsychiatry Interview  (12/26/21)  pt is tired   oriented to place>> knows that she is at [facility]  day: Sunday, December  date:  26    ROS:  denies SI  denies HI  denies hallucinations (no AH, no VH)  c/o being in pain  >> feet, legs, hands      Past Neuropsych History  Dandy walker malformation/hydrocephalus   non functioning  shunt since age 15  Sjogren          dx in May 2021      Home Meds > not  yet reconciled    per patient   Zoloft  gabapentin  tizanidine  cholesterol medication   some vitamins  too    Allergies/Sensitivities:   sulfa    Substance use hx:     etoh  occasional drink  tob  occasional smoker        Medical Hx:   HLD  sinus tachycardia   hyperglycemia  May 2021  hydrocephalus / Dandy Walker malformation   non functioning  shunt       (not working since age 15)  Sjogren syndrome   with small and large fiber neuropathy dx May 2021  occipital neuralgia  UTI  Staph aureus   MRSA   8/15/21      Recent hospital visits  8/15/21 AMS, catatonia, multiple med problems   acute encephalopathy   catatonia > neuro recommended valium taper  AVH with intermittent paranoia  likely delirium  PRN Zyprexa inpatient,  Zoloft 100 mg qd  discharge meds  sertraline 100 mg qd, gabapentin 900 mg tid, atorvastatin 10 mg  melatonin 5mg qhs prn, MVA, B6, thiamine, senna    12/19/21 to 12/22/21     admission for AMS, catatonic state  pt placed on valium 5mg qid > dc home on Valium taper    Surgeries:  C section   shunt    Psychosocial Hx:  lives with parents and her 6 yo daughter    Labs/diagnostics  12/26/21  RBC 3.47  Hgb 10.5 Hct 31.1  PMN 76%  K 3.4  CO2 18  UA hazy, +nitrite 3+ leuks  UDS+benzo  preg neg  COVID neg  BAL neg  TSH wnl      CT of head  large posterior fossa cyst, drainage catheter in place  hypoplasia of left cerebellar hemisphere    MSE:  Gait and station: not observed  Speech rate and volume: high pitched voice, non pressured, non slurred  Thought content: denies SI, denies HI, denies hallucinations or paranoia  Judgment: good at present, was poor at home  Insight: good at this time  Mood: better  Affect:  polite, cooperative, calm  Appearance:  thin, bruised right arm, sitting up on gurney  Behavior: no abnormal movements  Tp:  l/l/gd  Attention span/concentration: adequate  Cognition: grossly intact >  formal assessment deferred      Past Medical History:   Past Medical History:   Diagnosis Date    Allergy     Hydrocephalus     Hypertension      (ventriculoperitoneal) shunt status     not  active currently      Laboratory Data:   Labs Reviewed   CBC W/ AUTO DIFFERENTIAL - Abnormal; Notable for the following components:       Result Value    RBC 3.47 (*)     Hemoglobin 10.5 (*)     Hematocrit 31.1 (*)     Gran % 75.8 (*)     Lymph % 16.1 (*)     All other components within normal limits   COMPREHENSIVE METABOLIC PANEL - Abnormal; Notable for the following components:    Potassium 3.4 (*)     CO2 18 (*)     All other components within normal limits   URINALYSIS, REFLEX TO URINE CULTURE - Abnormal; Notable for the following components:    Appearance, UA Hazy (*)     Nitrite, UA Positive (*)     Leukocytes, UA 3+ (*)     All other components within normal limits    Narrative:     Specimen Source->Urine   DRUG SCREEN PANEL, URINE EMERGENCY - Abnormal; Notable for the following components:    Benzodiazepines Presumptive Positive (*)     All other components within normal limits    Narrative:     Specimen Source->Urine   ACETAMINOPHEN LEVEL - Abnormal; Notable for the following components:    Acetaminophen (Tylenol), Serum <3.0 (*)     All other components within normal limits   URINALYSIS MICROSCOPIC - Abnormal; Notable for the following components:    WBC, UA 31 (*)     All other components within normal limits    Narrative:     Specimen Source->Urine   CULTURE, URINE   CULTURE, URINE   TSH   ALCOHOL,MEDICAL (ETHANOL)   PREGNANCY TEST, URINE RAPID    Narrative:     Specimen Source->Urine   C-REACTIVE PROTEIN   C-REACTIVE PROTEIN   SARS-COV-2 RDRP GENE       Allergies:  Review of patient's allergies indicates:   Allergen Reactions    Sulfa (sulfonamide antibiotics) Shortness Of Breath and Swelling       Medications in ER:   Medications   cephALEXin capsule 500 mg (500 mg Oral Given 12/26/21 2100)   haloperidol lactate injection 5 mg (5 mg Intramuscular Given 12/26/21 1619)   diphenhydrAMINE injection 50 mg (50 mg Intramuscular Given 12/26/21 1619)   lorazepam (ATIVAN) injection 2 mg (2 mg Intramuscular  Given 12/26/21 1619)       Medications at home:    No new subjective & objective note has been filed under this hospital service since the last note was generated.      Assessment - Diagnosis - Goals:     Assessment:  37 yo female with hx of Dandy Walker malformation and hydrocephalus > Non functioning  shunt since age 15.    May 2021 dx of Sjogens syndrome  Aug 2021 medical admission for AMS/catatonia/hallucinations    found to have MRSA UTI    placed on valium taper for cataonia    12/19/21 to 12/22/21  medical admission      catatonia, AMS   dc meds:   valium taper> 5mg qid 1 day, 5mg tid x 3 days, 5mg bid x 2 days,        2mg tid x 2 days, 2 mg bid x 2 days, 2mg qd x 3 days, then stop   Lipitor 10 mg qd, melatonin 5mg qhs PRN, restasis drop bid in both eyes,   MVI/iron/Ca daily, B6 25mg po qd, gabapentin 900mg tid , senna, D3vs D2,   sertraline 100 mg qpm, tizanidine 4mg tid       Patient was sent from home to ER on Sunday afternoon 12/26/21 due to behaviors.  Found to have UTI upon workup at ER        differential etiology for odd behaviors at home includes:  benzodiazepine delirium  (intoxication/disinhibition from buildup of Valium)  UTI (recurrent)  posterior fossa malformation/cerebellar hypoplasia   anticholinergic  delirium (tizanidine)  autoimmune condition      Sjogren syndrome    *recommend inpatient medical floor admission      Diagnoses:  DSM 5  unspecified delirium   confusion, erratic behaviors at home on 12/26/21  r/o benzodiazepine intoxication   r/o NCSE    recommendations:  1)legal   no indication for PEC at this time    2)diagnostic clarification  consider EEG     consider neurology consult for alternative to tizanidine  [risk of urinary retention >> recurrent UTIs]    CRP, SSA/SSB AB levels        3)valium  patient was recently dcd from hospital admission on extended Valium taper  this may now be accumulating given long half life, causing delirium/confusion/disinhibition and possibly  contributing to urinary retention and repeat UTI      recommend:  D/C  valium taper    [5mg tid]    continue home medication dose of gabapentin once reconciled (900 mg tid ?)    begin Depakene (liquid)or Depakote DR for seizure prophylaxis/catatonia prophylaxis  250 mg po bid x 7 days  125 mg po bid x 7 days, then stop      4)UTI  per ER / hospitalist      5)disposition  anticipate DC to home when medically stable  f/u outpatient rheumatology/neurology/PCP           Time with patient: 15min      More than 50% of the time was spent counseling/coordinating care    Consulting clinician was informed of the encounter and consult note.    Consultation ended: 12/27/2021 at 1235am on 12/27/21      Audelia Man MD   Psychiatry  [facility]

## 2021-12-27 NOTE — ASSESSMENT & PLAN NOTE
Presented combative with altered mentation. CT head neg. COVID19 neg. UDS + benzo. Urine with + nitrites and 3+ leukocytes. Await urine culture.    Psych assessed and made recs, according to her she has been off benzo  AA0x4 as of now  Continue ceftriaxone  Check folate, B12, RPR, ammonia

## 2021-12-27 NOTE — H&P
[facility] Medicine  History & Physical    Patient Name: Lore Johnson  MRN: [id number]  Patient Class: OP- Observation  Admission Date: 12/26/2021  Attending Physician: Dk Lopez DO   Primary Care Provider: Primary Doctor No    Patient information was obtained from patient, past medical records and ER records.     Subjective:     Principal Problem:Acute encephalopathy    Chief Complaint:   Chief Complaint   Patient presents with    Altered Mental Status     Pt presents to ED today via [facility] who reports family called for bizarre behavior, pt yelling, throwing items in home, and crawling under bed. EMS reports pt displaying aggressive behavior , kicking EMS , and pt arrived in four point restraints. Pt arrived talking in loud tone and using profanity. Dr. Oneal in hallway assessing pt.        HPI: Ms. Johnson is a 36-year-old female with a medical history that includes anxiety, hydrocephalus s/p noncommunicating  shunt and hypertension. She was brought to the ED via EMS from home after family reported that the patient became combative with bizarre behavior and yelling. She arrived in 4 point restraints. The family denies any prior psychiatric history. On arrival to the ED she was found to be slightly hypertensive 's. Initial labs were remarkable for K 3.4, + benzo UDS and + nitrites in urine with 3+ leukocytes. A CT of the head was negative, COVID19 neg and ETOH neg. In the ED she was given IV Benadryl, Haldol, Ativan and Rocephin. She was initially placed under PEC with tele-psych consulted but they recommended medical evaluation for cause of psychosis. PEC was rescinded. Upon my assessment she was now AAOx4. She states she remembers being altered and was with a similar episode in the past. She was admitted in august of 2021 to [facility] with similar symptoms and treated with abx for a UTI as well as a valium taper for catatonia with subsequent improvement in her mental  status. Her EEG was noted to be without seizure activity at that time and her CTH and MRI brain were without acute intracranial abnormalities. She represented to [place] last week with similar presentation and placed on Valium taper but has been unable to obtain Valium from pharmacy since discharge. Previous documented urine culture grew E. Coli sensitive to Rocephin. She will admit to [facility] hospital medicine service for continued monitoring and treatment.               Past Medical History:   Diagnosis Date    Allergy     Anxiety     Hydrocephalus     Hypertension      (ventriculoperitoneal) shunt status     not active currently       Past Surgical History:   Procedure Laterality Date     SECTION      VENTRICULOPERITONEAL SHUNT         Review of patient's allergies indicates:   Allergen Reactions    Sulfa (sulfonamide antibiotics) Shortness Of Breath and Swelling       No current facility-administered medications on file prior to encounter.     Current Outpatient Medications on File Prior to Encounter   Medication Sig    b complex vitamins tablet Take 1 tablet by mouth once daily.    sertraline (ZOLOFT) 100 MG tablet 1 tablet EVERY PM (route: oral)    [DISCONTINUED] escitalopram oxalate (LEXAPRO) 20 MG tablet Take 20 mg by mouth once daily.    famotidine (PEPCID) 20 MG tablet Take 1 tablet (20 mg total) by mouth 2 (two) times daily.    ferrous sulfate 325 mg (65 mg iron) Tab tablet Take 325 mg by mouth daily with breakfast.    meclizine (ANTIVERT) 25 mg tablet Take 1 tablet (25 mg total) by mouth 3 (three) times daily as needed for Dizziness.    ondansetron (ZOFRAN-ODT) 4 MG TbDL Take 1 tablet (4 mg total) by mouth every 6 (six) hours as needed (Nausea vomiting).    promethazine (PHENERGAN) 25 MG tablet Take 1 tablet (25 mg total) by mouth every 6 (six) hours as needed for Nausea.    [DISCONTINUED] hydrOXYzine HCl (ATARAX) 25 MG tablet Take 25 mg by mouth 3 (three) times daily  as needed for Itching.    [DISCONTINUED] hydrOXYzine HCL (ATARAX) 25 MG tablet Take 2 tablets (50 mg total) by mouth 3 (three) times daily as needed for Anxiety.    [DISCONTINUED] hydrOXYzine pamoate (VISTARIL) 25 MG Cap Take 1 capsule (25 mg total) by mouth 4 (four) times daily.    [DISCONTINUED] propranolol (INDERAL) 40 MG tablet Take 40 mg by mouth 3 (three) times daily.    [DISCONTINUED] sucralfate (CARAFATE) 100 mg/mL suspension Take 10 mLs (1 g total) by mouth 4 (four) times daily.     Family History     Problem Relation (Age of Onset)    Diabetes Father    Heart disease Father    Hypertension Mother    Mental illness Father        Tobacco Use    Smoking status: Former Smoker    Smokeless tobacco: Never Used   Substance and Sexual Activity    Alcohol use: Yes     Comment: occ    Drug use: No    Sexual activity: Yes     Partners: Male     Birth control/protection: None     Review of Systems   Constitutional: Negative for chills, fever and unexpected weight change.   HENT: Negative for ear pain and sore throat.    Eyes: Negative for pain and discharge.   Respiratory: Negative for cough, shortness of breath and wheezing.    Cardiovascular: Negative for chest pain, palpitations and leg swelling.   Gastrointestinal: Negative for abdominal distention, abdominal pain, blood in stool, diarrhea, nausea and vomiting.   Endocrine: Negative for polydipsia, polyphagia and polyuria.   Genitourinary: Negative for difficulty urinating, dysuria and flank pain.   Musculoskeletal: Positive for arthralgias and myalgias. Negative for gait problem and neck stiffness.   Skin: Negative for color change and wound.   Neurological: Positive for weakness. Negative for dizziness, syncope and speech difficulty.   Hematological: Negative for adenopathy.   Psychiatric/Behavioral: Positive for confusion. Negative for suicidal ideas.     Objective:     Vital Signs (Most Recent):  Temp: 97.9 °F (36.6 °C) (12/27/21 0750)  Pulse: 100  (12/27/21 0750)  Resp: 18 (12/27/21 0350)  BP: 136/80 (12/27/21 0750)  SpO2: 100 % (12/27/21 0750) Vital Signs (24h Range):  Temp:  [96.5 °F (35.8 °C)-98.5 °F (36.9 °C)] 97.9 °F (36.6 °C)  Pulse:  [] 100  Resp:  [15-20] 18  SpO2:  [99 %-100 %] 100 %  BP: (126-146)/(74-90) 136/80        Body mass index is 24.89 kg/m².    Physical Exam  Vitals and nursing note reviewed.   Constitutional:       General: She is not in acute distress.     Appearance: Normal appearance.   HENT:      Head: Atraumatic.      Right Ear: External ear normal.      Left Ear: External ear normal.      Nose: Nose normal. No congestion.      Mouth/Throat:      Mouth: Mucous membranes are moist.      Pharynx: Oropharynx is clear.   Eyes:      Extraocular Movements: Extraocular movements intact.      Pupils: Pupils are equal, round, and reactive to light.   Cardiovascular:      Rate and Rhythm: Normal rate and regular rhythm.      Pulses: Normal pulses.      Heart sounds: Normal heart sounds.   Pulmonary:      Effort: Pulmonary effort is normal.      Breath sounds: Normal breath sounds.   Abdominal:      General: Bowel sounds are normal.      Palpations: Abdomen is soft.   Musculoskeletal:         General: Normal range of motion.      Cervical back: Normal range of motion. No rigidity.      Right lower leg: No edema.      Left lower leg: No edema.   Skin:     General: Skin is warm and dry.      Capillary Refill: Capillary refill takes less than 2 seconds.   Neurological:      General: No focal deficit present.      Mental Status: She is alert. Mental status is at baseline.      Motor: Weakness present.      Coordination: Impaired rapid alternating movements.      Gait: Gait abnormal.   Psychiatric:         Mood and Affect: Mood normal.         Behavior: Behavior normal.         Cognition and Memory: She exhibits impaired recent memory.           CRANIAL NERVES     CN III, IV, VI   Pupils are equal, round, and reactive to light.        Significant Labs:   CBC:   Recent Labs   Lab 12/26/21 1710 12/27/21  0256   WBC 7.10 5.49   HGB 10.5* 10.5*   HCT 31.1* 31.5*    192     CMP:   Recent Labs   Lab 12/26/21  1710 12/27/21  0256    141   K 3.4* 3.6    110   CO2 18* 22*   GLU 87 87   BUN 17 16   CREATININE 0.9 0.8   CALCIUM 8.7 8.6*   PROT 6.5 5.9*   ALBUMIN 4.0 3.6   BILITOT 0.3 0.3   ALKPHOS 65 63   AST 30 26   ALT 22 21   ANIONGAP 14 9   EGFRNONAA >60 >60     Lactic Acid: No results for input(s): LACTATE in the last 48 hours.  Troponin: No results for input(s): TROPONINI in the last 48 hours.  TSH:   Recent Labs   Lab 12/27/21 0256   TSH 2.102     Urine Studies:   Recent Labs   Lab 12/26/21 1711   COLORU Yellow   APPEARANCEUA Hazy*   PHUR 6.0   SPECGRAV 1.015   PROTEINUA Negative   GLUCUA Negative   KETONESU Negative   BILIRUBINUA Negative   OCCULTUA Negative   NITRITE Positive*   UROBILINOGEN Negative   LEUKOCYTESUR 3+*   RBCUA 4   WBCUA 31*   BACTERIA Rare   SQUAMEPITHEL 6       Significant Imaging: I have reviewed all pertinent imaging results/findings within the past 24 hours.    Assessment/Plan:     * Acute encephalopathy  Presented combative with altered mentation. CT head neg. COVID19 neg. UDS + benzo. Urine with + nitrites and 3+ leukocytes. Await urine culture.    Will consult psych for eval for multiple prior episodes of catatonia and AMS  AA0x4 as of now  Continue Keflex for uti      Urinary tract infection without hematuria  Presented in the past with UTI related acute encephalopathy. Latest culture in chart grew E. Coli. Given IV Rocephin in ED and then switched to PO Keflex.  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  S/P  shunt    Chronic, non communicating shunt  No evidence of hydrocephalus on imaging      Sjogren's syndrome with peripheral nervous system involvement  Recently diagnosed early part of 2021  Unable to ambulate without walker and motorized wheelchair  Consult  PT/OT  Would like PT for home upon discharge      Anxiety  Resume Zoloft      S/P  shunt  See above      VTE Risk Mitigation (From admission, onward)         Ordered     IP VTE LOW RISK PATIENT  Once         12/27/21 0110     Place sequential compression device  Until discontinued         12/27/21 0110     Place LEONID hose  Until discontinued         12/27/21 0110                           Roxanna THORPE-FNP-C  [facility]  Department of [facility] Medicine  [phone]

## 2021-12-27 NOTE — PT/OT/SLP PROGRESS
Physical Therapy      Patient Name:  Lore Johnson   MRN:  [id number]    Patient not seen today secondary to nursing hold.  BP's 150's/120's and singing at a loud volume.  Patient unable to be redirected and is grabbing at nurses at times.   Will follow-up.

## 2021-12-27 NOTE — NURSING
Patient's /120, . Unable to recheck because patient is resisting. Patient noted to be laying side ways with her head between the railings staring at the floor repeating the word no. Dr Lopez informed and put in haldol 1x dose and q6 PRN.

## 2021-12-27 NOTE — PROGRESS NOTES
[facility] Medicine  Progress Note    Patient Name: Lore Johnson  MRN: [id number]  Patient Class: OP- Observation   Admission Date: 12/26/2021  Length of Stay: 0 days  Attending Physician: Dk Lopez DO  Primary Care Provider: Primary Doctor No        Subjective:     Principal Problem:Acute encephalopathy        HPI:  Ms. Johnson is a 36-year-old female with a medical history that includes anxiety, hydrocephalus s/p noncommunicating  shunt and hypertension. She was brought to the ED via EMS from home after family reported that the patient became combative with bizarre behavior and yelling. She arrived in 4 point restraints. The family denies any prior psychiatric history. On arrival to the ED she was found to be slightly hypertensive 's. Initial labs were remarkable for K 3.4, + benzo UDS and + nitrites in urine with 3+ leukocytes. A CT of the head was negative, COVID19 neg and ETOH neg. In the ED she was given IV Benadryl, Haldol, Ativan and Rocephin. She was initially placed under PEC with tele-psych consulted but they recommended medical evaluation for cause of psychosis. PEC was rescinded. Upon my assessment she was now AAOx4. She states she remembers being altered and was with a similar episode in the past. She was admitted in august of 2021 to [facility] with similar symptoms and treated with abx for a UTI as well as a valium taper for catatonia with subsequent improvement in her mental status. Her EEG was noted to be without seizure activity at that time and her CTH and MRI brain were without acute intracranial abnormalities. She represented to [place] last week with similar presentation and placed on Valium taper but has been unable to obtain Valium from pharmacy since discharge. Previous documented urine culture grew E. Coli sensitive to Rocephin. She will admit to [facility] hospital medicine service for continued monitoring and treatment.                  Overview/Hospital Course:  No notes on file    Interval History: Patient appears comfortable in bed this AM but episodes aof yelling and anxiety throughout the day, admitted overnight with psychosis, off PEC, mentation improved, AAOx3, UA dirty on abxm seen by telepscyh, according to her she hasnt been taking any benzo and has been unable to fill it outpatient, re-consult telepsych today, get EEG    Review of Systems   Constitutional: Negative for chills and fever.   Respiratory: Negative for shortness of breath.    Cardiovascular: Negative for chest pain and leg swelling.   Gastrointestinal: Negative for abdominal pain, diarrhea, nausea and vomiting.   Genitourinary: Negative for dysuria.   Musculoskeletal: Negative for neck stiffness.   Neurological: Positive for weakness.   Psychiatric/Behavioral: Negative for confusion and suicidal ideas.     Objective:     Vital Signs (Most Recent):  Temp: 98.2 °F (36.8 °C) (12/27/21 1156)  Pulse: 105 (12/27/21 1156)  Resp: 18 (12/27/21 1156)  BP: (!) 144/91 (12/27/21 1156)  SpO2: 98 % (12/27/21 1156) Vital Signs (24h Range):  Temp:  [96.5 °F (35.8 °C)-98.5 °F (36.9 °C)] 98.2 °F (36.8 °C)  Pulse:  [] 105  Resp:  [15-20] 18  SpO2:  [98 %-100 %] 98 %  BP: (126-146)/(74-91) 144/91        Body mass index is 24.89 kg/m².  No intake or output data in the 24 hours ending 12/27/21 1333   Physical Exam    Significant Labs:   All pertinent labs within the past 24 hours have been reviewed.  Blood Culture: No results for input(s): LABBLOO in the last 48 hours.  BMP:   Recent Labs   Lab 12/27/21  0256   GLU 87      K 3.6      CO2 22*   BUN 16   CREATININE 0.8   CALCIUM 8.6*   MG 2.1     CBC:   Recent Labs   Lab 12/26/21  1710 12/27/21  0256   WBC 7.10 5.49   HGB 10.5* 10.5*   HCT 31.1* 31.5*    192     CMP:   Recent Labs   Lab 12/26/21  1710 12/27/21  0256    141   K 3.4* 3.6    110   CO2 18* 22*   GLU 87 87   BUN 17 16   CREATININE 0.9 0.8    CALCIUM 8.7 8.6*   PROT 6.5 5.9*   ALBUMIN 4.0 3.6   BILITOT 0.3 0.3   ALKPHOS 65 63   AST 30 26   ALT 22 21   ANIONGAP 14 9   EGFRNONAA >60 >60     Cardiac Markers: No results for input(s): CKMB, MYOGLOBIN, BNP, TROPISTAT in the last 48 hours.  Coagulation: No results for input(s): PT, INR, APTT in the last 48 hours.  Lactic Acid: No results for input(s): LACTATE in the last 48 hours.  Troponin: No results for input(s): TROPONINI in the last 48 hours.  TSH:   Recent Labs   Lab 12/27/21  0256   TSH 2.102       Significant Imaging: I have reviewed all pertinent imaging results/findings within the past 24 hours.      Assessment/Plan:      * Acute encephalopathy  Presented combative with altered mentation. CT head neg. COVID19 neg. UDS + benzo. Urine with + nitrites and 3+ leukocytes. Await urine culture.    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 Latest culture in chart grew E. Coli. Given IV Rocephin in Continue ceftriaxone  Await culture      S/P  shunt  See above        VTE Risk Mitigation (From admission, onward)         Ordered     IP VTE LOW RISK PATIENT  Once         12/27/21 0110     Place sequential compression device  Until discontinued         12/27/21 0110     Place LEONID hose  Until discontinued         12/27/21 0110                Discharge Planning   GABI:      Code Status: Full Code   Is the patient  medically ready for discharge?:     Reason for patient still in hospital (select all that apply): Treatment                     Dk Lopez,   Department of Hospital Medicine   [facility]

## 2021-12-27 NOTE — ASSESSMENT & PLAN NOTE
Presented in the past with UTI related acute encephalopathy. Latest culture in chart grew E. Coli. Given IV Rocephin in ED and then switched to PO Keflex.  Await culture

## 2021-12-27 NOTE — ASSESSMENT & PLAN NOTE
Recently diagnosed early part of 2021  Unable to ambulate without walker and motorized wheelchair  Consult PT/OT  Pain in hands and feet, restart gabapentin

## 2021-12-28 LAB
ALBUMIN SERPL BCP-MCNC: 3.7 G/DL (ref 3.5–5.2)
ALP SERPL-CCNC: 61 U/L (ref 55–135)
ALT SERPL W/O P-5'-P-CCNC: 17 U/L (ref 10–44)
ANION GAP SERPL CALC-SCNC: 11 MMOL/L (ref 8–16)
AST SERPL-CCNC: 28 U/L (ref 10–40)
BASOPHILS # BLD AUTO: 0.02 K/UL (ref 0–0.2)
BASOPHILS NFR BLD: 0.4 % (ref 0–1.9)
BILIRUB SERPL-MCNC: 0.4 MG/DL (ref 0.1–1)
BUN SERPL-MCNC: 13 MG/DL (ref 6–20)
CALCIUM SERPL-MCNC: 8.9 MG/DL (ref 8.7–10.5)
CHLORIDE SERPL-SCNC: 107 MMOL/L (ref 95–110)
CO2 SERPL-SCNC: 22 MMOL/L (ref 23–29)
CREAT SERPL-MCNC: 0.7 MG/DL (ref 0.5–1.4)
DIFFERENTIAL METHOD: ABNORMAL
EOSINOPHIL # BLD AUTO: 0.1 K/UL (ref 0–0.5)
EOSINOPHIL NFR BLD: 1.8 % (ref 0–8)
ERYTHROCYTE [DISTWIDTH] IN BLOOD BY AUTOMATED COUNT: 13.5 % (ref 11.5–14.5)
EST. GFR  (AFRICAN AMERICAN): >60 ML/MIN/1.73 M^2
EST. GFR  (NON AFRICAN AMERICAN): >60 ML/MIN/1.73 M^2
GLUCOSE SERPL-MCNC: 89 MG/DL (ref 70–110)
HCT VFR BLD AUTO: 33.8 % (ref 37–48.5)
HGB BLD-MCNC: 11.3 G/DL (ref 12–16)
IMM GRANULOCYTES # BLD AUTO: 0.02 K/UL (ref 0–0.04)
IMM GRANULOCYTES NFR BLD AUTO: 0.4 % (ref 0–0.5)
LYMPHOCYTES # BLD AUTO: 1.5 K/UL (ref 1–4.8)
LYMPHOCYTES NFR BLD: 29.3 % (ref 18–48)
MAGNESIUM SERPL-MCNC: 2.1 MG/DL (ref 1.6–2.6)
MCH RBC QN AUTO: 30.1 PG (ref 27–31)
MCHC RBC AUTO-ENTMCNC: 33.4 G/DL (ref 32–36)
MCV RBC AUTO: 90 FL (ref 82–98)
MONOCYTES # BLD AUTO: 0.4 K/UL (ref 0.3–1)
MONOCYTES NFR BLD: 7.3 % (ref 4–15)
NEUTROPHILS # BLD AUTO: 3.1 K/UL (ref 1.8–7.7)
NEUTROPHILS NFR BLD: 60.8 % (ref 38–73)
NRBC BLD-RTO: 0 /100 WBC
PHOSPHATE SERPL-MCNC: 3.5 MG/DL (ref 2.7–4.5)
PLATELET # BLD AUTO: 161 K/UL (ref 150–450)
PMV BLD AUTO: 10.3 FL (ref 9.2–12.9)
POCT GLUCOSE: 103 MG/DL (ref 70–110)
POTASSIUM SERPL-SCNC: 3.6 MMOL/L (ref 3.5–5.1)
PROT SERPL-MCNC: 6.1 G/DL (ref 6–8.4)
RBC # BLD AUTO: 3.75 M/UL (ref 4–5.4)
RPR SER QL: NORMAL
SODIUM SERPL-SCNC: 140 MMOL/L (ref 136–145)
WBC # BLD AUTO: 5.05 K/UL (ref 3.9–12.7)

## 2021-12-28 PROCEDURE — 95816 EEG AWAKE AND DROWSY: CPT

## 2021-12-28 PROCEDURE — 25000003 PHARM REV CODE 250: Performed by: FAMILY MEDICINE

## 2021-12-28 PROCEDURE — 83735 ASSAY OF MAGNESIUM: CPT | Performed by: NURSE PRACTITIONER

## 2021-12-28 PROCEDURE — 80053 COMPREHEN METABOLIC PANEL: CPT | Performed by: NURSE PRACTITIONER

## 2021-12-28 PROCEDURE — 97116 GAIT TRAINING THERAPY: CPT

## 2021-12-28 PROCEDURE — 84100 ASSAY OF PHOSPHORUS: CPT | Performed by: NURSE PRACTITIONER

## 2021-12-28 PROCEDURE — 11000001 HC ACUTE MED/SURG PRIVATE ROOM

## 2021-12-28 PROCEDURE — 63600175 PHARM REV CODE 636 W HCPCS: Performed by: FAMILY MEDICINE

## 2021-12-28 PROCEDURE — 95816 PR EEG,W/AWAKE & DROWSY RECORD: ICD-10-PCS | Mod: 26,,, | Performed by: PSYCHIATRY & NEUROLOGY

## 2021-12-28 PROCEDURE — 25000003 PHARM REV CODE 250: Performed by: NURSE PRACTITIONER

## 2021-12-28 PROCEDURE — 25000003 PHARM REV CODE 250: Performed by: INTERNAL MEDICINE

## 2021-12-28 PROCEDURE — 36415 COLL VENOUS BLD VENIPUNCTURE: CPT | Performed by: NURSE PRACTITIONER

## 2021-12-28 PROCEDURE — 97162 PT EVAL MOD COMPLEX 30 MIN: CPT

## 2021-12-28 PROCEDURE — 85025 COMPLETE CBC W/AUTO DIFF WBC: CPT | Performed by: NURSE PRACTITIONER

## 2021-12-28 PROCEDURE — 95816 EEG AWAKE AND DROWSY: CPT | Mod: 26,,, | Performed by: PSYCHIATRY & NEUROLOGY

## 2021-12-28 PROCEDURE — G0378 HOSPITAL OBSERVATION PER HR: HCPCS

## 2021-12-28 RX ORDER — LORAZEPAM 2 MG/ML
INJECTION INTRAMUSCULAR
Status: DISPENSED
Start: 2021-12-28 | End: 2021-12-29

## 2021-12-28 RX ORDER — VALPROIC ACID 250 MG/5ML
250 SOLUTION ORAL EVERY 12 HOURS
Qty: 140 ML | Refills: 0 | Status: SHIPPED | OUTPATIENT
Start: 2021-12-28 | End: 2022-01-11

## 2021-12-28 RX ORDER — ONDANSETRON 8 MG/1
8 TABLET, ORALLY DISINTEGRATING ORAL EVERY 8 HOURS PRN
Qty: 20 TABLET | Refills: 0 | Status: SHIPPED | OUTPATIENT
Start: 2021-12-28 | End: 2021-12-30

## 2021-12-28 RX ORDER — GABAPENTIN 300 MG/1
300 CAPSULE ORAL 3 TIMES DAILY
Qty: 90 CAPSULE | Refills: 11 | Status: SHIPPED | OUTPATIENT
Start: 2021-12-28 | End: 2021-12-30

## 2021-12-28 RX ORDER — HYDROXYZINE PAMOATE 25 MG/1
25 CAPSULE ORAL EVERY 8 HOURS PRN
Qty: 60 CAPSULE | Refills: 2 | Status: SHIPPED | OUTPATIENT
Start: 2021-12-28 | End: 2021-12-30

## 2021-12-28 RX ORDER — CIPROFLOXACIN 500 MG/1
500 TABLET ORAL 2 TIMES DAILY
Qty: 10 TABLET | Refills: 0 | Status: SHIPPED | OUTPATIENT
Start: 2021-12-28 | End: 2021-12-30 | Stop reason: SDUPTHER

## 2021-12-28 RX ORDER — VALPROIC ACID 250 MG/5ML
500 SOLUTION ORAL EVERY 12 HOURS
Status: DISCONTINUED | OUTPATIENT
Start: 2021-12-28 | End: 2021-12-30 | Stop reason: HOSPADM

## 2021-12-28 RX ADMIN — GABAPENTIN 300 MG: 300 CAPSULE ORAL at 08:12

## 2021-12-28 RX ADMIN — FERROUS SULFATE TAB 325 MG (65 MG ELEMENTAL FE) 1 EACH: 325 (65 FE) TAB at 09:12

## 2021-12-28 RX ADMIN — VALPROIC ACID 500 MG: 250 SOLUTION ORAL at 08:12

## 2021-12-28 RX ADMIN — GABAPENTIN 300 MG: 300 CAPSULE ORAL at 04:12

## 2021-12-28 RX ADMIN — Medication 6 MG: at 08:12

## 2021-12-28 RX ADMIN — SERTRALINE 100 MG: 100 TABLET, FILM COATED ORAL at 08:12

## 2021-12-28 RX ADMIN — LORAZEPAM 0.5 MG: 2 INJECTION INTRAMUSCULAR; INTRAVENOUS at 03:12

## 2021-12-28 RX ADMIN — ACETAMINOPHEN 650 MG: 325 TABLET ORAL at 09:12

## 2021-12-28 RX ADMIN — HYDROXYZINE PAMOATE 25 MG: 25 CAPSULE ORAL at 07:12

## 2021-12-28 RX ADMIN — GABAPENTIN 300 MG: 300 CAPSULE ORAL at 09:12

## 2021-12-28 RX ADMIN — ACETAMINOPHEN 650 MG: 325 TABLET ORAL at 08:12

## 2021-12-28 NOTE — PLAN OF CARE
Problem: Physical Therapy Goal  Goal: Physical Therapy Goal  Description: Patient is at prior level of function.  No inpatient PT needs.    Outcome: MET

## 2021-12-28 NOTE — ASSESSMENT & PLAN NOTE
Presented in the past with UTI related acute encephalopathy.   Latest culture in chart grew E. Coli. Given IV Rocephin- Continue  Urine culture

## 2021-12-28 NOTE — HOSPITAL COURSE
Admitted with encephalopathy, UA positive and urine culture with E coli started on ceftriaxone.  Patient MET on 12/28-likely due to catatonia and responded to low-dose Ativan.  Patient requesting to be discharged AMA as she has Rituxan scheduled for tomorrow at [facility].  Educated patient on positive urine culture pending susceptibility.  Will discharge on p.o. antibiotics

## 2021-12-28 NOTE — PROGRESS NOTES
12/28/21 1443   Significant Events This Shift   Significant Events Rapid Response Team call   Virtual Nurse - Description of Significant Event met called overhead. see rapid response documentation.

## 2021-12-28 NOTE — ASSESSMENT & PLAN NOTE
Presented combative with altered mentation. CT head neg. COVID19 neg. UDS + benzo. Urine with + nitrites and 3+ leukocytes.   Urine culture with presumptive E.coli  Psych assessed and made recs, according to her she has been off benzo  Continue ceftriaxone   folate, B12, RPR, ammonia

## 2021-12-28 NOTE — ASSESSMENT & PLAN NOTE
Presented in the past with UTI related acute encephalopathy.   Urine culture- E. Coli. Given IV Rocephin- Continue

## 2021-12-28 NOTE — SUBJECTIVE & OBJECTIVE
Interval History: Patient lying in bed, no new complaint    admitted overnight with psychosis, off PEC, mentation improved  UA dirty on abxm seen by telepscyh, according to her she hasnt been taking any benzo and has been unable to fill it outpatient, urine cx with presumptive E.coli- on Rocephin.    Review of Systems   Constitutional: Negative for chills and fever.   Respiratory: Negative for shortness of breath.    Cardiovascular: Negative for chest pain and leg swelling.   Gastrointestinal: Negative for abdominal pain, diarrhea, nausea and vomiting.   Genitourinary: Negative for dysuria.   Musculoskeletal: Negative for neck stiffness.   Neurological: Positive for weakness.   Psychiatric/Behavioral: Negative for confusion and suicidal ideas.     Objective:     Vital Signs (Most Recent):  Temp: 98.3 °F (36.8 °C) (12/28/21 0826)  Pulse: (!) 121 (12/28/21 0826)  Resp: 20 (12/28/21 0826)  BP: 133/82 (12/28/21 0826)  SpO2: 98 % (12/28/21 0826) Vital Signs (24h Range):  Temp:  [97.7 °F (36.5 °C)-98.3 °F (36.8 °C)] 98.3 °F (36.8 °C)  Pulse:  [103-121] 121  Resp:  [16-20] 20  SpO2:  [97 %-100 %] 98 %  BP: (133-159)/() 133/82     Weight: 63 kg (138 lb 14.2 oz)  Body mass index is 23.84 kg/m².  No intake or output data in the 24 hours ending 12/28/21 1007   Physical Exam  Vitals and nursing note reviewed.   Constitutional:       General: She is not in acute distress.     Appearance: Normal appearance.   HENT:      Head: Atraumatic.      Nose: No congestion.   Cardiovascular:      Rate and Rhythm: Normal rate and regular rhythm.      Pulses: Normal pulses.      Heart sounds: Normal heart sounds.   Pulmonary:      Effort: Pulmonary effort is normal.      Breath sounds: Normal breath sounds.   Abdominal:      General: Bowel sounds are normal.      Palpations: Abdomen is soft.   Musculoskeletal:         General: Normal range of motion.      Cervical back: Normal range of motion. No rigidity.      Right lower leg: No  edema.      Left lower leg: No edema.   Skin:     General: Skin is warm and dry.      Capillary Refill: Capillary refill takes less than 2 seconds.   Neurological:      General: No focal deficit present.      Mental Status: She is alert. Mental status is at baseline.      Motor: Weakness present.      Coordination: Impaired rapid alternating movements.      Gait: Gait abnormal.   Psychiatric:         Mood and Affect: Mood normal.         Behavior: Behavior normal.         Cognition and Memory: She exhibits impaired recent memory.         Significant Labs:   All pertinent labs within the past 24 hours have been reviewed.  Blood Culture: No results for input(s): LABBLOO in the last 48 hours.  BMP:   Recent Labs   Lab 12/28/21  0502   GLU 89      K 3.6      CO2 22*   BUN 13   CREATININE 0.7   CALCIUM 8.9   MG 2.1     CBC:   Recent Labs   Lab 12/26/21  1710 12/27/21  0256 12/28/21  0502   WBC 7.10 5.49 5.05   HGB 10.5* 10.5* 11.3*   HCT 31.1* 31.5* 33.8*    192 161     CMP:   Recent Labs   Lab 12/26/21  1710 12/27/21  0256 12/28/21  0502    141 140   K 3.4* 3.6 3.6    110 107   CO2 18* 22* 22*   GLU 87 87 89   BUN 17 16 13   CREATININE 0.9 0.8 0.7   CALCIUM 8.7 8.6* 8.9   PROT 6.5 5.9* 6.1   ALBUMIN 4.0 3.6 3.7   BILITOT 0.3 0.3 0.4   ALKPHOS 65 63 61   AST 30 26 28   ALT 22 21 17   ANIONGAP 14 9 11   EGFRNONAA >60 >60 >60     Cardiac Markers: No results for input(s): CKMB, MYOGLOBIN, BNP, TROPISTAT in the last 48 hours.  Coagulation: No results for input(s): PT, INR, APTT in the last 48 hours.  Lactic Acid: No results for input(s): LACTATE in the last 48 hours.  Troponin: No results for input(s): TROPONINI in the last 48 hours.  TSH:   Recent Labs   Lab 12/27/21  0256   TSH 2.102       Significant Imaging: I have reviewed all pertinent imaging results/findings within the past 24 hours.

## 2021-12-28 NOTE — PT/OT/SLP EVAL
"Physical Therapy Evaluation & Discharge    Patient Name:  Lore Johnson   MRN:  [id number]    Recommendations:     Discharge Recommendations:  outpatient PT,outpatient OT   Discharge Equipment Recommendations: none   Barriers to discharge: None    Assessment:     Lore Johnson is a 36 y.o. female admitted with a medical diagnosis of Acute encephalopathy.  She presents with the following impairments/functional limitations:  weakness,impaired functional mobilty,impaired cognition,decreased safety awareness,impaired endurance,gait instability,decreased coordination,pain,impaired sensation,impaired balance,abnormal tone, and decreased lower extremity function.  Patient evaluated by physical therapy and appears to be at prior level of function.  Recommend patient see an outpatient neuro physical and occupational therapist after discharge home.  Patient is in agreement.      Recent Surgery: * No surgery found *      Plan:     · Discharge inpatient physical therapy, recommend continued therapy in outpatient setting    Subjective     Chief Complaint: "I am doing better today"  Patient/Family Comments/goals: To get back home to see my daughter  Pain/Comfort:  · Pain Rating 1: 8/10  · Location - Orientation 1: generalized  · Location 1:  (joint pain, more in feet and hands)  · Pain Addressed 1: Reposition,Distraction,Cessation of Activity,Nurse notified  · Pain Rating Post-Intervention 1: 8/10    Patients cultural, spiritual, Druze conflicts given the current situation: no    Living Environment:  Patient lives with parents and 6 yo daughter in a H, no MAYKEL.  T/S with TTB in bathroom.    Prior to admission, patients level of function was patient used power wheelchair more often and would walk with walker with assistance.  Equipment used at home: wheelchair,walker, rolling,bath bench,cane, quad,grab bar,slide board,other (see comments) (power wheelchair).  DME owned (not currently used): none.  Upon discharge, patient will " have assistance from family.    Objective:     Communicated with nurse Muñoz prior to session.  Patient found supine with bed alarm,telemetry,peripheral IV  upon PT entry to room.    General Precautions: Standard, fall   Orthopedic Precautions:N/A   Braces: N/A  Respiratory Status: Room air    Exams:  · Cognitive Exam:  Patient is oriented to Person, Place, Time, Situation and   · Fine Motor Coordination:    · -       Impaired  B LASHAWN decreased B,, Left hand, finger to nose decr, Right hand, finger to nose decr, Left hand thumb/finger opposition skills decr and Right hand thumb/finger opposition skills decr  · Gross Motor Coordination:  Decreased B LEs  · Postural Exam:  Patient presented with the following abnormalities:    · -       Rounded shoulders  · -       Forward head  · -       Posterior pelvic tilt  · Sensation:    · -       Impaired  light/touch decreased stocking glove distribution light touch B  · Skin Integrity/Edema:      · -       Skin integrity: Bruising of R thigh  · RLE ROM: Deficits: Hip decr 25%, Knee lacking 5 deg extension.  Ankle lacking 5 deg dorisflexion, and lacking 5 degrees subtalar neutral  · RLE Strength: Deficits: Hip:  3-/5, Knee: 3+/5, Ankle 2/5  · LLE ROM: Deficits: Hip decr 25%, Knee WNL, Ankle lacking 10 degrees dorsiflexion, 15 degrees subtalar neutral  · LLE Strength: Deficits: Hip 3-/5, Knee 3+/5, Ankle 2/5      Functional Mobility:  · Bed Mobility:     · Rolling Left:  modified independence  · Scooting: modified independence  · Supine to Sit: supervision  · Sit to Supine: supervision  · Transfers:  Sit to Stand:  stand by assistance with rolling walker  · Gait: with RW x 35' with CGA, foot flat initial contact on R and max decr WB on L LE with inability to bear weight on L foot.  Max WB on UE's  · Balance: Sitting EOB:  SBA, no LOB.  Standing:  With RW, no overt LOB, however mildly unsteady    Therapeutic Activities and Exercises:   Educated on role of physical therapy.    Educated on safety while in acute hospital - calling for assist etc.   Discussed outpatient physical and occupational therapy for longstanding neuro deficits in B LEs    AM-PAC 6 CLICK MOBILITY  Total Score:17     Patient left supine with all lines intact, call button in reach and bed alarm on.    GOALS:   Multidisciplinary Problems     Physical Therapy Goals        Problem: Physical Therapy Goal    Goal Priority Disciplines Outcome Goal Variances Interventions   Physical Therapy Goal     PT, PT/OT Ongoing, Progressing     Description: Patient is at prior level of function.  No inpatient PT needs.                     History:     Past Medical History:   Diagnosis Date    Allergy     Anxiety     Hydrocephalus     Hypertension      (ventriculoperitoneal) shunt status     not active currently       Past Surgical History:   Procedure Laterality Date     SECTION      VENTRICULOPERITONEAL SHUNT         Time Tracking:     PT Received On: 21  PT Start Time: 1020     PT Stop Time: 1049  PT Total Time (min): 29 min     Billable Minutes: Evaluation 15 and Gait Training 14      2021

## 2021-12-28 NOTE — NURSING
Patient still not seen by tele psche as of the moment for re-consultation. Tele psyche center aware of the consult.

## 2021-12-28 NOTE — SIGNIFICANT EVENT
MET called due to change in mentation with lethargy, dilated  Sluggish pupils.  Patient able to follow some, and very slowly  Previous UDS positive for benzo  Reviewed all patient medication  Plan low-dose Ativan  Consult Neurology: appreciate  recommendation

## 2021-12-28 NOTE — ASSESSMENT & PLAN NOTE
Presented combative with altered mentation. CT head neg. COVID19 neg. UDS + benzo. Urine with + nitrites and 3+ leukocytes.   Urine culture with presumptive E.coli  Psych assessed and made recs, according to her she has been off benzo  Continue ceftriaxone   folate, B12, RPR, ammonia    Psych consult- :  D/C  valium taper    [5mg tid]  Continue home medication dose of gabapentin once reconciled (900 mg tid ?)  begin Depakene (liquid)or Depakote DR for seizure prophylaxis/catatonia prophylaxis  250 mg po bid x 7 days  125 mg po bid x 7 days, then stop  f/u outpatient rheumatology/neurology/PCP

## 2021-12-28 NOTE — PROGRESS NOTES
[facility] Medicine  Progress Note    Patient Name: Lore Johnson  MRN: [id number]  Patient Class: OP- Observation   Admission Date: 12/26/2021  Length of Stay: 0 days  Attending Physician: Robyn Chaney*  Primary Care Provider: Mima Lacey MD        Subjective:     Principal Problem:Acute encephalopathy        HPI:  Ms. Johnson is a 36-year-old female with a medical history that includes anxiety, hydrocephalus s/p noncommunicating  shunt and hypertension. She was brought to the ED via EMS from home after family reported that the patient became combative with bizarre behavior and yelling. She arrived in 4 point restraints. The family denies any prior psychiatric history. On arrival to the ED she was found to be slightly hypertensive 's. Initial labs were remarkable for K 3.4, + benzo UDS and + nitrites in urine with 3+ leukocytes. A CT of the head was negative, COVID19 neg and ETOH neg. In the ED she was given IV Benadryl, Haldol, Ativan and Rocephin. She was initially placed under PEC with tele-psych consulted but they recommended medical evaluation for cause of psychosis. PEC was rescinded. Upon my assessment she was now AAOx4. She states she remembers being altered and was with a similar episode in the past. She was admitted in august of 2021 to [facility] with similar symptoms and treated with abx for a UTI as well as a valium taper for catatonia with subsequent improvement in her mental status. Her EEG was noted to be without seizure activity at that time and her CTH and MRI brain were without acute intracranial abnormalities. She represented to [place] last week with similar presentation and placed on Valium taper but has been unable to obtain Valium from pharmacy since discharge. Previous documented urine culture grew E. Coli sensitive to Rocephin. She will admit to [facility] medicine service for continued monitoring and treatment.                  Overview/Hospital Course:  No notes on file    Interval History: Patient lying in bed, no new complaint    admitted overnight with psychosis, off PEC, mentation improved  UA dirty on abxm seen by telepscyh, according to her she hasnt been taking any benzo and has been unable to fill it outpatient, urine cx with presumptive E.coli- on Rocephin.    Review of Systems   Constitutional: Negative for chills and fever.   Respiratory: Negative for shortness of breath.    Cardiovascular: Negative for chest pain and leg swelling.   Gastrointestinal: Negative for abdominal pain, diarrhea, nausea and vomiting.   Genitourinary: Negative for dysuria.   Musculoskeletal: Negative for neck stiffness.   Neurological: Positive for weakness.   Psychiatric/Behavioral: Negative for confusion and suicidal ideas.     Objective:     Vital Signs (Most Recent):  Temp: 98.3 °F (36.8 °C) (12/28/21 0826)  Pulse: (!) 121 (12/28/21 0826)  Resp: 20 (12/28/21 0826)  BP: 133/82 (12/28/21 0826)  SpO2: 98 % (12/28/21 0826) Vital Signs (24h Range):  Temp:  [97.7 °F (36.5 °C)-98.3 °F (36.8 °C)] 98.3 °F (36.8 °C)  Pulse:  [103-121] 121  Resp:  [16-20] 20  SpO2:  [97 %-100 %] 98 %  BP: (133-159)/() 133/82     Weight: 63 kg (138 lb 14.2 oz)  Body mass index is 23.84 kg/m².  No intake or output data in the 24 hours ending 12/28/21 1007   Physical Exam  Vitals and nursing note reviewed.   Constitutional:       General: She is not in acute distress.     Appearance: Normal appearance.   HENT:      Head: Atraumatic.      Nose: No congestion.   Cardiovascular:      Rate and Rhythm: Normal rate and regular rhythm.      Pulses: Normal pulses.      Heart sounds: Normal heart sounds.   Pulmonary:      Effort: Pulmonary effort is normal.      Breath sounds: Normal breath sounds.   Abdominal:      General: Bowel sounds are normal.      Palpations: Abdomen is soft.   Musculoskeletal:         General: Normal range of motion.      Cervical back: Normal range  of motion. No rigidity.      Right lower leg: No edema.      Left lower leg: No edema.   Skin:     General: Skin is warm and dry.      Capillary Refill: Capillary refill takes less than 2 seconds.   Neurological:      General: No focal deficit present.      Mental Status: She is alert. Mental status is at baseline.      Motor: Weakness present.      Coordination: Impaired rapid alternating movements.      Gait: Gait abnormal.   Psychiatric:         Mood and Affect: Mood normal.         Behavior: Behavior normal.         Cognition and Memory: She exhibits impaired recent memory.         Significant Labs:   All pertinent labs within the past 24 hours have been reviewed.  Blood Culture: No results for input(s): LABBLOO in the last 48 hours.  BMP:   Recent Labs   Lab 12/28/21  0502   GLU 89      K 3.6      CO2 22*   BUN 13   CREATININE 0.7   CALCIUM 8.9   MG 2.1     CBC:   Recent Labs   Lab 12/26/21  1710 12/27/21  0256 12/28/21  0502   WBC 7.10 5.49 5.05   HGB 10.5* 10.5* 11.3*   HCT 31.1* 31.5* 33.8*    192 161     CMP:   Recent Labs   Lab 12/26/21  1710 12/27/21  0256 12/28/21  0502    141 140   K 3.4* 3.6 3.6    110 107   CO2 18* 22* 22*   GLU 87 87 89   BUN 17 16 13   CREATININE 0.9 0.8 0.7   CALCIUM 8.7 8.6* 8.9   PROT 6.5 5.9* 6.1   ALBUMIN 4.0 3.6 3.7   BILITOT 0.3 0.3 0.4   ALKPHOS 65 63 61   AST 30 26 28   ALT 22 21 17   ANIONGAP 14 9 11   EGFRNONAA >60 >60 >60     Cardiac Markers: No results for input(s): CKMB, MYOGLOBIN, BNP, TROPISTAT in the last 48 hours.  Coagulation: No results for input(s): PT, INR, APTT in the last 48 hours.  Lactic Acid: No results for input(s): LACTATE in the last 48 hours.  Troponin: No results for input(s): TROPONINI in the last 48 hours.  TSH:   Recent Labs   Lab 12/27/21  0256   TSH 2.102       Significant Imaging: I have reviewed all pertinent imaging results/findings within the past 24 hours.      Assessment/Plan:      * Acute  encephalopathy  Presented combative with altered mentation. CT head neg. COVID19 neg. UDS + benzo. Urine with + nitrites and 3+ leukocytes.   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   Latest culture in chart grew E. Coli. Given IV Rocephin- Continue  Urine culture      S/P  shunt  See above        VTE Risk Mitigation (From admission, onward)         Ordered     IP VTE LOW RISK PATIENT  Once         12/27/21 0110     Place sequential compression device  Until discontinued         12/27/21 0110     Place LEONID hose  Until discontinued         12/27/21 0110                Discharge Planning   GABI:      Code Status: Full Code   Is the patient medically ready for discharge?:     Reason for patient still in hospital (select all that apply): Patient trending condition  Discharge Plan A: Home,Home with family            Robyn Chaney MD  Department of Hospital Medicine   [facility]

## 2021-12-28 NOTE — NURSING
Dr. Colmenares notified pt had refused EEG, but after discussing w/ pt agreed, however states she wants to leave AMA due to having Rituxan infusion at [facility] tomorrow.

## 2021-12-28 NOTE — PROGRESS NOTES
12/28/21 1321   Nurse Notification   Charge Nurse Notified? Yes   Name of Charge Nurse leyla   Bedside Nurse Notified? Yes   Name of Bedside Nurse matt clark   Nurse Notfication Method Secure Chat   Nurse Notified Of Orders  (mews 5 , pulse 135)

## 2021-12-28 NOTE — CARE UPDATE
Patient is scheduled for Rituxan infusion for 8:00 a.m. tomorrow 12/29 at [facility].  Pharmacy not able to confirm if this can be given as inpatient, he induction a. Patient decides to leave AMA.  Updated patient with recent urine culture with E coli pending susceptibility.    I will prescribe po antibiotics nevertheless and depakote as recommended by psych

## 2021-12-28 NOTE — PLAN OF CARE
"Medications given per MAR. Safety maintained. Call bell within reach. Patient encouraged to call for assistance. Pt denies N/V, SOB, distress. Minimal complaints of pain in legs, pain went away on its own. Pt very quiet and withdrawn. When asked questions, pt either doesn't answer or barely moves head to gesture "yes or no."  Vital signs stable throughout the night. Afebrile. Will continue to monitor.    "

## 2021-12-28 NOTE — NURSING
MET called at this time.  tech at bedside attempting EEG, pt found to be extremely rigid and catatonic, repeating unintelligible words continuously. Pt arching back/pelvis off of bed and legs and feet turned inward and locked as well as hands clasped tight and arms rigid. Pt able to direct gaze when prompted but shows difficulty turning head. Pupils 4mm and reactive to light in beginning of MET, towards end pupils 7mm and sluggish. IV ativan given per MD order and symptoms gradually resolved over approximately 10 minutes, return to baseline answering all questions appropriately and following commands.

## 2021-12-29 LAB
ANTI-SSA ANTIBODY: 0.05 RATIO (ref 0–0.99)
ANTI-SSA INTERPRETATION: NEGATIVE
ANTI-SSB ANTIBODY: 0.05 RATIO (ref 0–0.99)
ANTI-SSB INTERPRETATION: NEGATIVE
BACTERIA UR CULT: ABNORMAL

## 2021-12-29 PROCEDURE — 25000003 PHARM REV CODE 250: Performed by: INTERNAL MEDICINE

## 2021-12-29 PROCEDURE — 25000003 PHARM REV CODE 250: Performed by: NURSE PRACTITIONER

## 2021-12-29 PROCEDURE — 99204 OFFICE O/P NEW MOD 45 MIN: CPT | Mod: 95,AF,HB, | Performed by: PSYCHIATRY & NEUROLOGY

## 2021-12-29 PROCEDURE — 25000003 PHARM REV CODE 250: Performed by: FAMILY MEDICINE

## 2021-12-29 PROCEDURE — 63600175 PHARM REV CODE 636 W HCPCS: Performed by: INTERNAL MEDICINE

## 2021-12-29 PROCEDURE — 99204 PR OFFICE/OUTPT VISIT, NEW, LEVL IV, 45-59 MIN: ICD-10-PCS | Mod: 95,AF,HB, | Performed by: PSYCHIATRY & NEUROLOGY

## 2021-12-29 PROCEDURE — 11000001 HC ACUTE MED/SURG PRIVATE ROOM

## 2021-12-29 RX ORDER — LORAZEPAM 0.5 MG/1
0.5 TABLET ORAL ONCE AS NEEDED
Status: DISCONTINUED | OUTPATIENT
Start: 2021-12-29 | End: 2021-12-30 | Stop reason: HOSPADM

## 2021-12-29 RX ADMIN — VALPROIC ACID 500 MG: 250 SOLUTION ORAL at 08:12

## 2021-12-29 RX ADMIN — HYDROXYZINE PAMOATE 25 MG: 25 CAPSULE ORAL at 08:12

## 2021-12-29 RX ADMIN — SODIUM CHLORIDE: 0.9 INJECTION, SOLUTION INTRAVENOUS at 03:12

## 2021-12-29 RX ADMIN — ACETAMINOPHEN 650 MG: 325 TABLET ORAL at 03:12

## 2021-12-29 RX ADMIN — GABAPENTIN 300 MG: 300 CAPSULE ORAL at 03:12

## 2021-12-29 RX ADMIN — GABAPENTIN 300 MG: 300 CAPSULE ORAL at 08:12

## 2021-12-29 RX ADMIN — Medication 6 MG: at 08:12

## 2021-12-29 RX ADMIN — ACETAMINOPHEN 650 MG: 325 TABLET ORAL at 06:12

## 2021-12-29 RX ADMIN — GABAPENTIN 300 MG: 300 CAPSULE ORAL at 10:12

## 2021-12-29 RX ADMIN — CEFTRIAXONE 1 G: 1 INJECTION, SOLUTION INTRAVENOUS at 03:12

## 2021-12-29 RX ADMIN — FERROUS SULFATE TAB 325 MG (65 MG ELEMENTAL FE) 1 EACH: 325 (65 FE) TAB at 10:12

## 2021-12-29 RX ADMIN — SERTRALINE 100 MG: 100 TABLET, FILM COATED ORAL at 08:12

## 2021-12-29 RX ADMIN — ACETAMINOPHEN 650 MG: 325 TABLET ORAL at 11:12

## 2021-12-29 RX ADMIN — VALPROIC ACID 500 MG: 250 SOLUTION ORAL at 10:12

## 2021-12-29 NOTE — CONSULTS
Please see psychiatric consultation note from Dr. Alcantara on 12/29/2021.  Please let me know if any additional assistance / recommendations are needed.     Faheem Davies MD  [facility]  12/29/2021

## 2021-12-29 NOTE — PLAN OF CARE
AAOx4. No complaints of pain. PRN hydroxyzine given for anxiety. Slept well throughout shift. Bed locked in lowest position, bed alarm set and call bell within reach.

## 2021-12-29 NOTE — PLAN OF CARE
"NEUROLOGY FLOOR CONSULT    Reason for consult: Stiffening episode concerning for seizure     Informant: Primary team       Other sources of information : Chart review    CC:  "AMS"    HPI:   Lore Johnson is a 36 y.o. year old with history of Dandy walker malformation, hx of hydrocephalus S/P  shunt non functional for long time since she is 15 years of age, Sjogrens syndrome with peripheral neuropathy, Antibodies against VGKC previously treated with Solumedrol, IVIG and Rituxan infusion  6/2021 came to [place] ER with complaints of bizarre behavior, agitated, psychotic requiring restraints. Patient noted to have UTI and UDS positive for benzodiazepine. Patient discharged from [facility] a week ago with similar complaint in settings of UTI, sent home on Benzo taper for catatonia per psych recs. Patient mentions she has not been taking these medications. Patient has an episode of tonic posturing of upper and lower extremity that afternoon where patient is mute but able to track people. She received 0.5 mg ativan which resolved her symptoms completely and able to return to baseline. Patient was pleasant during my encounter was oriented. Patient does have dystonic lower extremities and mention she mention she has similar episodes in the past that improved with Rituxan infusion. Patient mentions she has to receive her next infusion tomorrow at [facility] and wants to be discharged today.    ROS: As per HPI    Histories:     Allergies:  Sulfa (sulfonamide antibiotics)    Current Medications:    Current Facility-Administered Medications   Medication Dose Route Frequency Provider Last Rate Last Admin    0.9%  NaCl infusion   Intravenous PRN Dk Singeravera, DO        acetaminophen tablet 650 mg  650 mg Oral Q8H PRN Roxanna Ornelas NP   650 mg at 12/28/21 2011    cefTRIAXone (ROCEPHIN) 1 g/50 mL D5W IVPB  1 g Intravenous Q24H Dk Shinea, DO   Stopped at 12/27/21 1443    ferrous sulfate tablet 1 each  1 tablet Oral Daily " Roxanna Ornelas NP   1 each at 21 0920    gabapentin capsule 300 mg  300 mg Oral TID Dk S Primavera, DO   300 mg at 21    haloperidol lactate injection 5 mg  5 mg Intravenous Q6H PRN Dk S Primavera, DO   5 mg at 21 1725    hydrOXYzine pamoate capsule 25 mg  25 mg Oral Q8H PRN Dk S Primavera, DO   25 mg at 21 194    ibuprofen tablet 600 mg  600 mg Oral Q6H PRN Dk S Primavera, DO        lorazepam (ATIVAN) 2 mg/mL injection             meclizine tablet 25 mg  25 mg Oral TID PRN Roxanna Ornelas NP        melatonin tablet 6 mg  6 mg Oral Nightly PRN Roxanna Ornelas NP   6 mg at 21    naloxone 0.4 mg/mL injection 0.02 mg  0.02 mg Intravenous PRN Roxanna Ornelas NP        ondansetron disintegrating tablet 8 mg  8 mg Oral Q8H PRN Roxanna Ornelas NP        ondansetron injection 4 mg  4 mg Intravenous Q8H PRN Roxanna Ornelas NP        senna-docusate 8.6-50 mg per tablet 1 tablet  1 tablet Oral Daily PRN Roxanna Ornelas NP        sertraline tablet 100 mg  100 mg Oral QHS Roxanna Ornelas NP   100 mg at 21    sodium chloride 0.9% flush 10 mL  10 mL Intravenous Q8H PRN Roxanna Ornelas NP        valproic acid (as sodium salt) 250 mg/5 mL (5 mL) syrup Soln 500 mg  500 mg Oral Q12H Robyn SALVADOR Chaney MD   500 mg at 21       Past Medical/Surgical/Family History:  Medical:   Past Medical History:   Diagnosis Date    Allergy     Anxiety     Hydrocephalus     Hypertension      (ventriculoperitoneal) shunt status     not active currently      Surgeries:   Past Surgical History:   Procedure Laterality Date     SECTION      VENTRICULOPERITONEAL SHUNT        Family:   Family History   Problem Relation Age of Onset    Diabetes Father     Heart disease Father     Mental illness Father     Hypertension Mother     Breast cancer Neg Hx     Colon cancer Neg Hx     Ovarian cancer Neg Hx     Stroke Neg Hx    ,      Social History:    Substance Abuse/Dependence History:  Tobacco: denied  EtOH: none  Ilicits: none    Occupational/Employment History:  Occupation: None      Current Evaluation:     Vital Signs:   Vitals:    12/28/21 1954   BP: (!) 131/90   Pulse: (!) 127   Resp: 20   Temp: 98 °F (36.7 °C)          ORIENTATION: Within Normal Limits, but patient has on/off periods of confusion, psychomotor retardation    MEMORY: recent and remote memory intact    LANGUAGE: 0=No aphasia, normal    CRANIAL NERVES:  Pupils dilated but reactive, EOMS intact, no facial asymmetry, tongue and palate midline    MOTOR:  Pronator drift: absent    Right UE  5/5  Left UE   5/5  Right deltoid  5/5  Left deltoid  5/5  Right biceps  5/5  Left biceps  5/5  Right hip flexor  5/5  Left hip flexor  5/5  Right quads  5/5  Left quads  5/5    Patient has dystonia in B/L lower extremity B/L ankle extended and inverted.    Normal tone noted in B/L upper extremity    DTR'S:  1+ B/L knee, unable to illicit reflexes, 2+ B/L biceps    SENSORY:  Intact to light touch    CEREBELLAR/GAIT:  Finger to nose:normal  Romberg negative B/L    LABORATORY STUDIES:  Recent Results (from the past 24 hour(s))   Comprehensive Metabolic Panel (CMP)    Collection Time: 12/28/21  5:02 AM   Result Value Ref Range    Sodium 140 136 - 145 mmol/L    Potassium 3.6 3.5 - 5.1 mmol/L    Chloride 107 95 - 110 mmol/L    CO2 22 (L) 23 - 29 mmol/L    Glucose 89 70 - 110 mg/dL    BUN 13 6 - 20 mg/dL    Creatinine 0.7 0.5 - 1.4 mg/dL    Calcium 8.9 8.7 - 10.5 mg/dL    Total Protein 6.1 6.0 - 8.4 g/dL    Albumin 3.7 3.5 - 5.2 g/dL    Total Bilirubin 0.4 0.1 - 1.0 mg/dL    Alkaline Phosphatase 61 55 - 135 U/L    AST 28 10 - 40 U/L    ALT 17 10 - 44 U/L    Anion Gap 11 8 - 16 mmol/L    eGFR if African American >60 >60 mL/min/1.73 m^2    eGFR if non African American >60 >60 mL/min/1.73 m^2   Magnesium    Collection Time: 12/28/21  5:02 AM   Result Value Ref Range    Magnesium 2.1 1.6 -  2.6 mg/dL   Phosphorus    Collection Time: 12/28/21  5:02 AM   Result Value Ref Range    Phosphorus 3.5 2.7 - 4.5 mg/dL   CBC with Automated Differential    Collection Time: 12/28/21  5:02 AM   Result Value Ref Range    WBC 5.05 3.90 - 12.70 K/uL    RBC 3.75 (L) 4.00 - 5.40 M/uL    Hemoglobin 11.3 (L) 12.0 - 16.0 g/dL    Hematocrit 33.8 (L) 37.0 - 48.5 %    MCV 90 82 - 98 fL    MCH 30.1 27.0 - 31.0 pg    MCHC 33.4 32.0 - 36.0 g/dL    RDW 13.5 11.5 - 14.5 %    Platelets 161 150 - 450 K/uL    MPV 10.3 9.2 - 12.9 fL    Immature Granulocytes 0.4 0.0 - 0.5 %    Gran # (ANC) 3.1 1.8 - 7.7 K/uL    Immature Grans (Abs) 0.02 0.00 - 0.04 K/uL    Lymph # 1.5 1.0 - 4.8 K/uL    Mono # 0.4 0.3 - 1.0 K/uL    Eos # 0.1 0.0 - 0.5 K/uL    Baso # 0.02 0.00 - 0.20 K/uL    nRBC 0 0 /100 WBC    Gran % 60.8 38.0 - 73.0 %    Lymph % 29.3 18.0 - 48.0 %    Mono % 7.3 4.0 - 15.0 %    Eosinophil % 1.8 0.0 - 8.0 %    Basophil % 0.4 0.0 - 1.9 %    Differential Method Automated    POCT glucose    Collection Time: 12/28/21  2:46 PM   Result Value Ref Range    POCT Glucose 103 70 - 110 mg/dL     Normal TSH  B12 - 637  Folate 11.2  A1c - 4.7      RADIOLOGY STUDIES:  I have personally reviewed the images performed.     HEAD CT:   FINDINGS:  Large posterior fossa cyst is noted with drainage catheter in place.  There is no acute abnormality involving the cerebral hemispheres.  Stable ventricles, cisterns and sulci as compared to prior exam.  No evidence of acute hydrocephalus.  There is intact gray-white matter junction differentiation.  No acute intracranial hemorrhage or hematoma.  There is no evidence herniation.  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  The imaged paranasal sinuses mastoid air cells are clear.  Orbits grossly appear intact and symmetric.  Bony calvarium grossly appears intact.     Impression:     No acute abnormality.     Developmental findings in the posterior fossa.     No acute  hydrocephalus.      Assessment:  DIANNE Johnson 37 y/o female with history of Sjogren syndrome, VGKC antibodies with peripheral neuropathy and episodes of psychosis, catatonia and dystonia in B/L lower extremity previously treated with IVIG, high dose steroids and Rituxan therapy every 6 months, came in with complaints of psychosis and agitation concern for possible AMS from untreated UTI. Patient has episode of increased tone that resolved with benzodiazepine. Patient also has dystonia which improved in the past with rituxan therapy. Patient due for next dose of rituxan, given active UTI would consider follow up with Rheumatology for initiation of rituximab. Patient episodes concerning for catatonia>seizures, previous not on any seizure medications. EEG in the past negative and currently not on any seizure medications.     Acute Dystonia/Delerium/Catatonia  - Continue Gabapentin 300 mg TID  - Consult Rheumatology for Rituxan infusion   - Follow up with Neurology as patient, patient mention she has appointment in March 2022 with Neurology at [facility]  - EEG today - limited due to artifact but normal study  - Management of UTI per primary team  - Never been on seizure medications in the past, would not recommend to initiate any seizure medication at this time  - Psych consulted and initiated on depakote taper. Management of catatonia like episodes per psych   - Please call Neurology for additional questions.    Patient discussed with Dr. Kian Delgado MD  [employer] Neurology PGY-4

## 2021-12-29 NOTE — PROGRESS NOTES
[facility] Medicine  Progress Note    Patient Name: Lore Johnson  MRN: [id number]  Patient Class: IP- Inpatient   Admission Date: 12/26/2021  Length of Stay: 0 days  Attending Physician: Robyn Chaney*  Primary Care Provider: Mima Lacey MD        Subjective:     Principal Problem:Acute encephalopathy        HPI:  Ms. Johnson is a 36-year-old female with a medical history that includes anxiety, hydrocephalus s/p noncommunicating  shunt and hypertension. She was brought to the ED via EMS from home after family reported that the patient became combative with bizarre behavior and yelling. She arrived in 4 point restraints. The family denies any prior psychiatric history. On arrival to the ED she was found to be slightly hypertensive 's. Initial labs were remarkable for K 3.4, + benzo UDS and + nitrites in urine with 3+ leukocytes. A CT of the head was negative, COVID19 neg and ETOH neg. In the ED she was given IV Benadryl, Haldol, Ativan and Rocephin. She was initially placed under PEC with tele-psych consulted but they recommended medical evaluation for cause of psychosis. PEC was rescinded. Upon my assessment she was now AAOx4. She states she remembers being altered and was with a similar episode in the past. She was admitted in august of 2021 to [facility] with similar symptoms and treated with abx for a UTI as well as a valium taper for catatonia with subsequent improvement in her mental status. Her EEG was noted to be without seizure activity at that time and her CTH and MRI brain were without acute intracranial abnormalities. She represented to [facility] last week with similar presentation and placed on Valium taper but has been unable to obtain Valium from pharmacy since discharge. Previous documented urine culture grew E. Coli sensitive to Rocephin. She will admit to [facility] hospital medicine service for continued monitoring and treatment.                  Overview/Hospital Course:  Admitted with encephalopathy, UA positive and urine culture with E coli started on ceftriaxone.  Patient MET on 12/28-likely due to catatonia and responded to low-dose Ativan.  Patient requesting to be discharged AMA as she has Rituxan scheduled for tomorrow at [facility].  Educated patient on positive urine culture pending susceptibility.  Will discharge on p.o. antibiotics      Interval History: Patient sitting up in bed. She noted she decided against AMA yesterday to focus on present illness.   Urine culture with presumptive E. Coli on Ceftriaxone  Appreciates neurology and psych rec's       Review of Systems   Constitutional: Negative for chills and fever.   Respiratory: Negative for shortness of breath.    Cardiovascular: Negative for chest pain and leg swelling.   Gastrointestinal: Negative for abdominal pain, diarrhea, nausea and vomiting.   Genitourinary: Negative for dysuria.   Musculoskeletal: Negative for neck stiffness.   Neurological: Positive for weakness.   Psychiatric/Behavioral: Negative for confusion and suicidal ideas.     Objective:     Vital Signs (Most Recent):  Temp: 98.1 °F (36.7 °C) (12/29/21 0800)  Pulse: 105 (12/29/21 0800)  Resp: 18 (12/29/21 0800)  BP: 134/87 (12/29/21 0800)  SpO2: 98 % (12/29/21 0505) Vital Signs (24h Range):  Temp:  [98 °F (36.7 °C)-99.2 °F (37.3 °C)] 98.1 °F (36.7 °C)  Pulse:  [] 105  Resp:  [18-20] 18  SpO2:  [95 %-98 %] 98 %  BP: (129-139)/(78-90) 134/87     Weight: 63.1 kg (139 lb 1.8 oz)  Body mass index is 23.88 kg/m².    Intake/Output Summary (Last 24 hours) at 12/29/2021 0844  Last data filed at 12/28/2021 1800  Gross per 24 hour   Intake 240 ml   Output --   Net 240 ml      Physical Exam  Vitals and nursing note reviewed.   Constitutional:       General: She is not in acute distress.     Appearance: Normal appearance.   HENT:      Head: Atraumatic.      Nose: No congestion.   Cardiovascular:      Rate and Rhythm: Normal rate and  regular rhythm.      Pulses: Normal pulses.      Heart sounds: Normal heart sounds.   Pulmonary:      Effort: Pulmonary effort is normal.      Breath sounds: Normal breath sounds.   Abdominal:      General: Bowel sounds are normal.      Palpations: Abdomen is soft.   Musculoskeletal:         General: Normal range of motion.      Cervical back: Normal range of motion. No rigidity.      Right lower leg: No edema.      Left lower leg: No edema.   Skin:     General: Skin is warm and dry.      Capillary Refill: Capillary refill takes less than 2 seconds.   Neurological:      General: No focal deficit present.      Mental Status: She is alert. Mental status is at baseline.      Motor: Weakness present.      Coordination: Impaired rapid alternating movements.      Gait: Gait abnormal.   Psychiatric:         Mood and Affect: Mood normal.         Behavior: Behavior normal.         Significant Labs:   All pertinent labs within the past 24 hours have been reviewed.  Blood Culture: No results for input(s): LABBLOO in the last 48 hours.  BMP:   Recent Labs   Lab 12/28/21  0502   GLU 89      K 3.6      CO2 22*   BUN 13   CREATININE 0.7   CALCIUM 8.9   MG 2.1     CBC:   Recent Labs   Lab 12/28/21  0502   WBC 5.05   HGB 11.3*   HCT 33.8*        CMP:   Recent Labs   Lab 12/28/21  0502      K 3.6      CO2 22*   GLU 89   BUN 13   CREATININE 0.7   CALCIUM 8.9   PROT 6.1   ALBUMIN 3.7   BILITOT 0.4   ALKPHOS 61   AST 28   ALT 17   ANIONGAP 11   EGFRNONAA >60     Cardiac Markers: No results for input(s): CKMB, MYOGLOBIN, BNP, TROPISTAT in the last 48 hours.  Coagulation: No results for input(s): PT, INR, APTT in the last 48 hours.  Lactic Acid: No results for input(s): LACTATE in the last 48 hours.  Troponin: No results for input(s): TROPONINI in the last 48 hours.  TSH:   Recent Labs   Lab 12/27/21  0256   TSH 2.102       Significant Imaging: I have reviewed all pertinent imaging results/findings within  the past 24 hours.      Assessment/Plan:      * Acute encephalopathy  Presented combative with altered mentation. CT head neg. COVID19 neg. UDS + benzo. Urine with + nitrites and 3+ leukocytes.   Urine culture with presumptive E.coli  Psych assessed and made recs, according to her she has been off benzo  Continue ceftriaxone   folate, B12, RPR, ammonia    Psych consult- :  D/C  valium taper    [5mg tid]  Continue home medication dose of gabapentin once reconciled (900 mg tid ?)  begin Depakene (liquid)or Depakote DR for seizure prophylaxis/catatonia prophylaxis  250 mg po bid x 7 days  125 mg po bid x 7 days, then stop  f/u outpatient rheumatology/neurology/PCP        Sjogren's syndrome with peripheral nervous system involvement  Recently diagnosed early part of 2021  Unable to ambulate without walker and motorized wheelchair  Consult PT/OT  Pain in hands and feet, restart gabapentin      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   Urine culture- E. Coli. Given IV Rocephin- Continue        S/P  shunt  See above        VTE Risk Mitigation (From admission, onward)         Ordered     IP VTE LOW RISK PATIENT  Once         12/27/21 0110     Place sequential compression device  Until discontinued         12/27/21 0110     Place LEONID hose  Until discontinued         12/27/21 0110                Discharge Planning   GABI:      Code Status: Full Code   Is the patient medically ready for discharge?:     Reason for patient still in hospital (select all that apply): Patient trending condition  Discharge Plan A: Home,Home with family            Robyn Chaney MD  Department of Hospital Medicine   [facility]

## 2021-12-29 NOTE — PLAN OF CARE
Pt mental status returned to baseline AAOX4, behavior appropriate to situation with no neurologic deficits. Pt requesting to leave AMA, and Dr. Colmenares spoke w/ pt to complete AMA paperwork. Pts mother called nurse stating she does not think the pt should leave the hospital and she can always reschedule rituxan. She will not be coming to pick pt up. House supervisor and Charge RN notified.

## 2021-12-29 NOTE — SUBJECTIVE & OBJECTIVE
Interval History: Patient sitting up in bed. She noted she decided against AMA yesterday to focus on present illness.   Urine culture with presumptive E. Coli on Ceftriaxone  Appreciates neurology and psych rec's       Review of Systems   Constitutional: Negative for chills and fever.   Respiratory: Negative for shortness of breath.    Cardiovascular: Negative for chest pain and leg swelling.   Gastrointestinal: Negative for abdominal pain, diarrhea, nausea and vomiting.   Genitourinary: Negative for dysuria.   Musculoskeletal: Negative for neck stiffness.   Neurological: Positive for weakness.   Psychiatric/Behavioral: Negative for confusion and suicidal ideas.     Objective:     Vital Signs (Most Recent):  Temp: 98.1 °F (36.7 °C) (12/29/21 0800)  Pulse: 105 (12/29/21 0800)  Resp: 18 (12/29/21 0800)  BP: 134/87 (12/29/21 0800)  SpO2: 98 % (12/29/21 0505) Vital Signs (24h Range):  Temp:  [98 °F (36.7 °C)-99.2 °F (37.3 °C)] 98.1 °F (36.7 °C)  Pulse:  [] 105  Resp:  [18-20] 18  SpO2:  [95 %-98 %] 98 %  BP: (129-139)/(78-90) 134/87     Weight: 63.1 kg (139 lb 1.8 oz)  Body mass index is 23.88 kg/m².    Intake/Output Summary (Last 24 hours) at 12/29/2021 0844  Last data filed at 12/28/2021 1800  Gross per 24 hour   Intake 240 ml   Output --   Net 240 ml      Physical Exam  Vitals and nursing note reviewed.   Constitutional:       General: She is not in acute distress.     Appearance: Normal appearance.   HENT:      Head: Atraumatic.      Nose: No congestion.   Cardiovascular:      Rate and Rhythm: Normal rate and regular rhythm.      Pulses: Normal pulses.      Heart sounds: Normal heart sounds.   Pulmonary:      Effort: Pulmonary effort is normal.      Breath sounds: Normal breath sounds.   Abdominal:      General: Bowel sounds are normal.      Palpations: Abdomen is soft.   Musculoskeletal:         General: Normal range of motion.      Cervical back: Normal range of motion. No rigidity.      Right lower leg: No  edema.      Left lower leg: No edema.   Skin:     General: Skin is warm and dry.      Capillary Refill: Capillary refill takes less than 2 seconds.   Neurological:      General: No focal deficit present.      Mental Status: She is alert. Mental status is at baseline.      Motor: Weakness present.      Coordination: Impaired rapid alternating movements.      Gait: Gait abnormal.   Psychiatric:         Mood and Affect: Mood normal.         Behavior: Behavior normal.         Significant Labs:   All pertinent labs within the past 24 hours have been reviewed.  Blood Culture: No results for input(s): LABBLOO in the last 48 hours.  BMP:   Recent Labs   Lab 12/28/21  0502   GLU 89      K 3.6      CO2 22*   BUN 13   CREATININE 0.7   CALCIUM 8.9   MG 2.1     CBC:   Recent Labs   Lab 12/28/21  0502   WBC 5.05   HGB 11.3*   HCT 33.8*        CMP:   Recent Labs   Lab 12/28/21  0502      K 3.6      CO2 22*   GLU 89   BUN 13   CREATININE 0.7   CALCIUM 8.9   PROT 6.1   ALBUMIN 3.7   BILITOT 0.4   ALKPHOS 61   AST 28   ALT 17   ANIONGAP 11   EGFRNONAA >60     Cardiac Markers: No results for input(s): CKMB, MYOGLOBIN, BNP, TROPISTAT in the last 48 hours.  Coagulation: No results for input(s): PT, INR, APTT in the last 48 hours.  Lactic Acid: No results for input(s): LACTATE in the last 48 hours.  Troponin: No results for input(s): TROPONINI in the last 48 hours.  TSH:   Recent Labs   Lab 12/27/21  0256   TSH 2.102       Significant Imaging: I have reviewed all pertinent imaging results/findings within the past 24 hours.

## 2021-12-29 NOTE — PROCEDURES
Routine EEG Report    Lore Johnson  [id number]  1985    DATE OF SERVICE: 12/28/2021  REASON FOR CONSULT:  36-year-old woman admitted with bizarre behavior.  Evaluate for evidence of epileptiform activity.    METHODOLOGY   Electroencephalographic (EEG) recording is with electrodes placed according to the International 10-20 placement system.  Thirty two (32) channels of digital signal (sampling rate of 512/sec) including T1 and T2 was simultaneously recorded from the scalp and may include  EKG, EMG, and/or eye monitors.  Recording band pass was 0.1 to 512 hz.  Digital video recording of the patient is simultaneously recorded with the EEG.  The patient is instructed report clinical symptoms which may occur during the recording session.  EEG and video recording is stored and archived in digital format. Activation procedures which include photic stimulation, hyperventilation and instructing patients to perform simple task are done in selected patients.    The EEG is displayed on a monitor screen and can be reviewed using different montages.  Computer assisted analysis is employed to detect spike and electrographic seizure activity.   The entire record is submitted for computer analysis.  The entire recording is visually reviewed and the times identified by computer analysis as being spikes or seizures are reviewed again.  Compresses spectral analysis (CSA) is also performed on the activity recorded from each individual channel.  This is displayed as a power display of frequencies from 0 to 30 Hz over time.   The CSA is reviewed looking for asymmetries in power between homologous areas of the scalp and then compared with the original EEG recording.     S[employer] Seven software is also utilized in the review of this study.  This software suite analyzes the EEG recording in multiple domains.  Coherence and rhythmicity is computed to identify EEG sections which may contain organized seizures.  Each channel undergoes analysis to  detect presence of spike and sharp waves which have special and morphological characteristic of epileptic activity.  The routine EEG recording is converted from spacial into frequency domain.  This is then displayed comparing homologous areas to identify areas of significant asymmetry.  Algorithm to identify non-cortically generated artifact is used to separate eye movement, EMG and other artifact from the EEG.      EEG FINDINGS  Background activity:   Within the limitation of a significant amount of muscle artifact, the background is continuous and symmetric with a 9 hz maximal posterior dominant rhythm seen bilaterally.    Sleep:  Sleep is not captured during this recording session.    Activation procedures:   The patient is able to follow simple commands and answers orientation questions correctly.     Cardiac Monitor:   Tachycardia    Impression:   Within the limitation of a significant amount of muscle artifact, this is a normal awake routine EEG.  There are no prominent focal findings, no epileptiform discharges, and no electrographic seizures.   Of note, a normal EEG does not rule out the diagnosis of epilepsy.  Clinical correlation is advised.    Harriett Gastelum MD PhD  Neurology-Epilepsy  [facility].

## 2021-12-29 NOTE — CONSULTS
[facility]  Psychiatry  Telepsychiatry Consult Follow UP Note    Please see previous notes:    Patient agreeable to consultation via telepsychiatry.    Tele-Consultation from Psychiatry started: 12/29/2021 at 12:12am  The chief complaint leading to psychiatric consultation is:ams  This consultation was requested by Dr Colmenares, theattending physician.  The location of the consulting psychiatrist is [place], [place].  The patient location is  [facility]GICAL [facility]*   The patient arrived at the ED at: [place]    Also present with the patient at the time of the consultation: nobody    Patient Identification:   Lore Johnson is a 36 y.o. female.    Patient information was obtained from patient and past medical records.      Consults  Subjective:     Subjective:  37yo F with hx of anxiety and catatonia currently admitted with delirium in context of UTI.  She was seen by psychiatry earlier in stay who recommended tx of UTI, taper off valium, and taper with depakote (for catatonia prevention), recommended EEG (which did not demonstrate seizures).     On interview, patient was lucid and quite childlke in presentation. Denied anxiety, depression.Denied SI and HI. Reports she is willing to stay in the hospital for further tx but did not specify why. She could not recall seizure like event she had earlier this evening but did know she was in the hospital getting tx for a UTI.    Per nurse caring for patient, she had a seizure like episode earlier this evening prior to her shift where she was convulsing but eyes were open and tracking nurse at the time. Patient was going to leave AMA but could not get a ride home so decided to stay home. Nurse reports patient has been calm and cooperative throughout her shift. Nurse reported they wanted patient to get a PIERRE earlier after he seizure like event this evening but patient refused, attributing her sx to her sjogrens  No aggression or agitation noted.  This was the extent  "of patients complaints at this time. She seemed more interested in watching TV then speaking to me when I was interviewing her.    Psychiatric History:   Previous Psychiatric Hospitalizations: No   Previous Medication Trials: Yes   Previous Suicide Attempts: no   Hx of Anxiety disorder    Psychiatric Mental Status Exam:  Arousal: alert  Sensorium/Orientation: oriented to grossly intact  Behavior/Cooperation: semi cooperative, childlike  Speech: normal tone, normal rate, normal pitch, normal volume  Language: grossly intact  Mood: " fine "   Affect: appropriate  Thought Process: normal and logical  Thought Content:   Auditory hallucinations: NO  Visual hallucinations: NO  Paranoia: NO  Delusions:  NO  Suicidal ideation: NO  Homicidal ideation: NO  Attention/Concentration:  intact  Memory:    Recent:  Intact   Remote: Intact     Fund of Knowledge: Aware of current events   Abstract reasoning: similarities were abstract  Insight: has partial awareness of illness  Judgment: limited      Past Medical History:   Past Medical History:   Diagnosis Date    Allergy     Anxiety     Hydrocephalus     Hypertension      (ventriculoperitoneal) shunt status     not active currently      Laboratory Data:   Labs Reviewed   CBC W/ AUTO DIFFERENTIAL - Abnormal; Notable for the following components:       Result Value    RBC 3.47 (*)     Hemoglobin 10.5 (*)     Hematocrit 31.1 (*)     Gran % 75.8 (*)     Lymph % 16.1 (*)     All other components within normal limits   COMPREHENSIVE METABOLIC PANEL - Abnormal; Notable for the following components:    Potassium 3.4 (*)     CO2 18 (*)     All other components within normal limits   URINALYSIS, REFLEX TO URINE CULTURE - Abnormal; Notable for the following components:    Appearance, UA Hazy (*)     Nitrite, UA Positive (*)     Leukocytes, UA 3+ (*)     All other components within normal limits    Narrative:     Specimen Source->Urine   DRUG SCREEN PANEL, URINE EMERGENCY - Abnormal; " Notable for the following components:    Benzodiazepines Presumptive Positive (*)     All other components within normal limits    Narrative:     Specimen Source->Urine   ACETAMINOPHEN LEVEL - Abnormal; Notable for the following components:    Acetaminophen (Tylenol), Serum <3.0 (*)     All other components within normal limits   URINALYSIS MICROSCOPIC - Abnormal; Notable for the following components:    WBC, UA 31 (*)     All other components within normal limits    Narrative:     Specimen Source->Urine   CBC W/ AUTO DIFFERENTIAL - Abnormal; Notable for the following components:    RBC 3.48 (*)     Hemoglobin 10.5 (*)     Hematocrit 31.5 (*)     All other components within normal limits   CULTURE, URINE   TSH   ALCOHOL,MEDICAL (ETHANOL)   PREGNANCY TEST, URINE RAPID    Narrative:     Specimen Source->Urine   C-REACTIVE PROTEIN   C-REACTIVE PROTEIN   SARS-COV-2 RDRP GENE         Allergies:   Review of patient's allergies indicates:   Allergen Reactions    Sulfa (sulfonamide antibiotics) Shortness Of Breath and Swelling       Medications in ER:   Medications   sodium chloride 0.9% flush 10 mL (has no administration in time range)   senna-docusate 8.6-50 mg per tablet 1 tablet (has no administration in time range)   acetaminophen tablet 650 mg (650 mg Oral Given 12/28/21 2011)   melatonin tablet 6 mg (6 mg Oral Given 12/28/21 2011)   naloxone 0.4 mg/mL injection 0.02 mg (has no administration in time range)   ondansetron disintegrating tablet 8 mg (has no administration in time range)   ondansetron injection 4 mg (has no administration in time range)   ferrous sulfate tablet 1 each (1 each Oral Given 12/28/21 0920)   meclizine tablet 25 mg (has no administration in time range)   sertraline tablet 100 mg (100 mg Oral Given 12/28/21 2011)   cefTRIAXone (ROCEPHIN) 1 g/50 mL D5W IVPB (1 g Intravenous Not Given 12/28/21 8565)   hydrOXYzine pamoate capsule 25 mg (25 mg Oral Given 12/28/21 1949)   gabapentin capsule 300 mg  (300 mg Oral Given 12/28/21 2011)   0.9%  NaCl infusion (has no administration in time range)   ibuprofen tablet 600 mg (has no administration in time range)   haloperidol lactate injection 5 mg ( Intravenous Canceled Entry 12/27/21 1731)   lorazepam (ATIVAN) 2 mg/mL injection (  Canceled Entry 12/28/21 1515)   valproic acid (as sodium salt) 250 mg/5 mL (5 mL) syrup Soln 500 mg (500 mg Oral Given 12/28/21 2011)   haloperidol lactate injection 5 mg (5 mg Intramuscular Given 12/26/21 1619)   diphenhydrAMINE injection 50 mg (50 mg Intramuscular Given 12/26/21 1619)   lorazepam (ATIVAN) injection 2 mg (2 mg Intramuscular Given 12/26/21 1619)   hydrOXYzine injection 25 mg (25 mg Intramuscular Given 12/27/21 1403)   haloperidol lactate injection 5 mg (5 mg Intravenous Given 12/27/21 1630)   lorazepam (ATIVAN) injection 0.5 mg (0.5 mg Intravenous Given 12/28/21 1506)       Medications at home: reviewed with patient and in MAR    No new subjective & objective note has been filed under this hospital service since the last note was generated.      Assessment - Diagnosis - Goals:     Diagnosis/Impression:   Delirium resolving   Anxiety disorder by hx  R/o conversion disorder    Rec:   -No indication for PEC at this time    -May continue depakote taper as specified in the initial psychiatric consult unless neurology wants to continue medication for seizure PPX.   250 mg po bid x 7 days then    125 mg po bid x 7 days, then stop    -Continue sertaline 100mg po daily targeting anxiety    -Patient will need outpatient mental health follow up. Please provide her with information on local outpatient mental health resources.    -Continue tx of UTI, minimizing benzodiazepines. It appears delirium has improved.    Time with patient: 19 min  Total Time: 31 min      More than 50% of the time was spent counseling/coordinating care    Consulting clinician was informed of the encounter and consult note.    Consultation ended: 12/29/2021 at  12:42am    Payam Alcantara MD  Psychiatry  [facility]

## 2021-12-30 VITALS
TEMPERATURE: 98 F | OXYGEN SATURATION: 98 % | WEIGHT: 140 LBS | DIASTOLIC BLOOD PRESSURE: 95 MMHG | HEIGHT: 64 IN | RESPIRATION RATE: 18 BRPM | BODY MASS INDEX: 23.9 KG/M2 | SYSTOLIC BLOOD PRESSURE: 145 MMHG | HEART RATE: 100 BPM

## 2021-12-30 PROCEDURE — 25000003 PHARM REV CODE 250: Performed by: FAMILY MEDICINE

## 2021-12-30 PROCEDURE — 25000003 PHARM REV CODE 250: Performed by: NURSE PRACTITIONER

## 2021-12-30 PROCEDURE — 25000003 PHARM REV CODE 250: Performed by: INTERNAL MEDICINE

## 2021-12-30 RX ORDER — ONDANSETRON 8 MG/1
8 TABLET, ORALLY DISINTEGRATING ORAL EVERY 8 HOURS PRN
Qty: 20 TABLET | Refills: 0 | Status: SHIPPED | OUTPATIENT
Start: 2021-12-30

## 2021-12-30 RX ORDER — VALPROIC ACID 250 MG/5ML
250 SOLUTION ORAL EVERY 12 HOURS
Qty: 140 ML | Refills: 0 | OUTPATIENT
Start: 2021-12-30 | End: 2022-01-13

## 2021-12-30 RX ORDER — HYDROXYZINE PAMOATE 25 MG/1
25 CAPSULE ORAL EVERY 8 HOURS PRN
Qty: 60 CAPSULE | Refills: 2 | Status: SHIPPED | OUTPATIENT
Start: 2021-12-30

## 2021-12-30 RX ORDER — CIPROFLOXACIN 500 MG/1
500 TABLET ORAL 2 TIMES DAILY
Qty: 10 TABLET | Refills: 0 | Status: SHIPPED | OUTPATIENT
Start: 2021-12-30 | End: 2022-01-04

## 2021-12-30 RX ORDER — VALPROIC ACID 250 MG/5ML
250 SOLUTION ORAL EVERY 12 HOURS
Qty: 140 ML | Refills: 0 | Status: ON HOLD | OUTPATIENT
Start: 2021-12-30 | End: 2022-02-06 | Stop reason: HOSPADM

## 2021-12-30 RX ORDER — GABAPENTIN 300 MG/1
300 CAPSULE ORAL 3 TIMES DAILY
Qty: 90 CAPSULE | Refills: 11 | Status: ON HOLD | OUTPATIENT
Start: 2021-12-30 | End: 2022-02-06 | Stop reason: HOSPADM

## 2021-12-30 RX ADMIN — FERROUS SULFATE TAB 325 MG (65 MG ELEMENTAL FE) 1 EACH: 325 (65 FE) TAB at 08:12

## 2021-12-30 RX ADMIN — GABAPENTIN 300 MG: 300 CAPSULE ORAL at 08:12

## 2021-12-30 RX ADMIN — HYDROXYZINE PAMOATE 25 MG: 25 CAPSULE ORAL at 08:12

## 2021-12-30 RX ADMIN — VALPROIC ACID 500 MG: 250 SOLUTION ORAL at 08:12

## 2021-12-30 NOTE — PLAN OF CARE
Pt. AAOx4. Pt. Very pleasant today. C/O pain during shift, tylenol administered. Tolerating diet well. Diaper on. Bed alarm on and call light in reach. Pt. Instructed to call for assistance. Will continue to monitor.   Problem: Adult Inpatient Plan of Care  Goal: Plan of Care Review  Outcome: Ongoing, Progressing  Goal: Patient-Specific Goal (Individualized)  Outcome: Ongoing, Progressing  Goal: Absence of Hospital-Acquired Illness or Injury  Outcome: Ongoing, Progressing  Goal: Optimal Comfort and Wellbeing  Outcome: Ongoing, Progressing  Goal: Readiness for Transition of Care  Outcome: Ongoing, Progressing     Problem: Hypertension Comorbidity  Goal: Blood Pressure in Desired Range  Outcome: Ongoing, Progressing     Problem: UTI (Urinary Tract Infection)  Goal: Improved Infection Symptoms  Outcome: Ongoing, Progressing     Problem: Behavior Regulation Impairment (Psychotic Signs/Symptoms)  Goal: Improved Behavioral Control (Psychotic Signs/Symptoms)  Outcome: Ongoing, Progressing     Problem: Cognitive Impairment (Psychotic Signs/Symptoms)  Goal: Optimal Cognitive Function (Psychotic Signs/Symptoms)  Outcome: Ongoing, Progressing     Problem: Decreased Participation and Engagement (Psychotic Signs/Symptoms)  Goal: Increased Participation and Engagement (Psychotic Signs/Symptoms)  Outcome: Ongoing, Progressing     Problem: Mood Impairment (Psychotic Signs/Symptoms)  Goal: Improved Mood Symptoms (Psychotic Signs/Symptoms)  Outcome: Ongoing, Progressing     Problem: Psychomotor Impairment (Psychotic Signs/Symptoms)  Goal: Improved Psychomotor Symptoms (Psychotic Signs/Symptoms)  Outcome: Ongoing, Progressing     Problem: Sensory Perception Impairment (Psychotic Signs/Symptoms)  Goal: Decreased Sensory Symptoms (Psychotic Signs/Symptoms)  Outcome: Ongoing, Progressing     Problem: Sleep Disturbance (Psychotic Signs/Symptoms)  Goal: Improved Sleep (Psychotic Signs/Symptoms)  Outcome: Ongoing, Progressing     Problem:  Social, Occupational or Functional Impairment (Psychotic Signs/Symptoms)  Goal: Enhanced Social, Occupational or Functional Skills (Psychotic Signs/Symptoms)  Outcome: Ongoing, Progressing     Problem: Skin Injury Risk Increased  Goal: Skin Health and Integrity  Outcome: Ongoing, Progressing

## 2021-12-30 NOTE — ASSESSMENT & PLAN NOTE
Presented in the past with UTI related acute encephalopathy.   Urine culture- E. Coli. Given IV Rocephin- Continue  Discharge on PO antibiotics

## 2021-12-30 NOTE — CARE UPDATE
Updated patient's mother Maribell over the phone. She monroe concerns about rescheduling Rituxan infusion

## 2021-12-30 NOTE — PLAN OF CARE
pt for discharge to home today   no hh ordered - pt's mother provides care.    TN spoke with pt's mother Lo Reyna  [phone] - she will get off work at 1pm;  [employer] van set up for 2pm .      TN called and spoke with Luisana at the [facility] - pt's mother will be contacted next week with a re--booked infusion apt.      Mima Lacey MD (Cindy)  On 1/4/2022  10:40 AM   [address]  [facility] [place] [address]  [phone]   [facility] - OUTPATIENT    Infusion Center -- Luisana and the  at the Infusion Center will contact Ms. Reyna the week of Jan. 3rd with an apt to rebook pt's infusion -- phone # [phone]   [address] [place] [id number]        12/30/21 1118   Final Note   Assessment Type Final Discharge Note   Anticipated Discharge Disposition Home   What phone number can be called within the next 1-3 days to see how you are doing after discharge? [phone]   Hospital Resources/Appts/Education Provided Appointments scheduled and added to AVS   Post-Acute Status   Discharge Delays None known at this time

## 2021-12-30 NOTE — PROGRESS NOTES
[facility] Hospital Medicine  Progress Note    Patient Name: Lore Johnson  MRN: [id number]  Patient Class: IP- Inpatient   Admission Date: 12/26/2021  Length of Stay: 1 days  Attending Physician: Robyn Chaney*  Primary Care Provider: Mima Lacey MD        Subjective:     Principal Problem:Acute encephalopathy        HPI:  Ms. Johnson is a 36-year-old female with a medical history that includes anxiety, hydrocephalus s/p noncommunicating  shunt and hypertension. She was brought to the ED via EMS from home after family reported that the patient became combative with bizarre behavior and yelling. She arrived in 4 point restraints. The family denies any prior psychiatric history. On arrival to the ED she was found to be slightly hypertensive 's. Initial labs were remarkable for K 3.4, + benzo UDS and + nitrites in urine with 3+ leukocytes. A CT of the head was negative, COVID19 neg and ETOH neg. In the ED she was given IV Benadryl, Haldol, Ativan and Rocephin. She was initially placed under PEC with tele-psych consulted but they recommended medical evaluation for cause of psychosis. PEC was rescinded. Upon my assessment she was now AAOx4. She states she remembers being altered and was with a similar episode in the past. She was admitted in august of 2021 to [facility] with similar symptoms and treated with abx for a UTI as well as a valium taper for catatonia with subsequent improvement in her mental status. Her EEG was noted to be without seizure activity at that time and her CTH and MRI brain were without acute intracranial abnormalities. She represented to [place] last week with similar presentation and placed on Valium taper but has been unable to obtain Valium from pharmacy since discharge. Previous documented urine culture grew E. Coli sensitive to Rocephin. She will admit to [facility] hospital medicine service for continued monitoring and treatment.                  Overview/Hospital Course:  Admitted with encephalopathy, UA positive and urine culture with E coli started on ceftriaxone.  Patient MET on 12/28-likely due to catatonia and responded to low-dose Ativan.  Patient requesting to be discharged AMA as she has Rituxan scheduled for tomorrow at [facility].  Educated patient on positive urine culture pending susceptibility.  Will discharge on p.o. antibiotics      No new subjective & objective note has been filed under this hospital service since the last note was generated.      Assessment/Plan:      * Acute encephalopathy  Presented combative with altered mentation. CT head neg. COVID19 neg. UDS + benzo. Urine with + nitrites and 3+ leukocytes.   Urine culture with presumptive E.coli  Psych assessed and made recs, according to her she has been off benzo  Continue ceftriaxone   folate, B12, RPR, ammonia    Psych consult- :  D/C  valium taper    [5mg tid]  Continue home medication dose of gabapentin once reconciled (900 mg tid ?)  begin Depakene (liquid)or Depakote DR for seizure prophylaxis/catatonia prophylaxis  250 mg po bid x 7 days  125 mg po bid x 7 days, then stop  f/u outpatient rheumatology/neurology/PCP        Sjogren's syndrome with peripheral nervous system involvement  Recently diagnosed early part of 2021  Unable to ambulate without walker and motorized wheelchair  Consult PT/OT  Pain in hands and feet, restart gabapentin      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   Urine culture- E. Coli. Given IV Rocephin- Continue  Discharge on PO antibiotics        S/P  shunt  See above        VTE Risk Mitigation (From admission, onward)         Ordered     IP VTE LOW RISK PATIENT  Once         12/27/21 0110     Place sequential  compression device  Until discontinued         12/27/21 0110     Place LEONID hose  Until discontinued         12/27/21 0110                Discharge Planning   GABI: 12/30/2021     Code Status: Full Code   Is the patient medically ready for discharge?:     Reason for patient still in hospital (select all that apply): Patient trending condition  Discharge Plan A: Home,Home with family                  Robyn TIMI Chaney MD  Department of [facility] Medicine   [facility] Surg

## 2021-12-30 NOTE — DISCHARGE SUMMARY
[facility] Medicine  Discharge Summary      Patient Name: Lore Johnson  MRN: [id number]  Patient Class: IP- Inpatient  Admission Date: 12/26/2021  Hospital Length of Stay: 1 days  Discharge Date and Time:  12/30/2021 7:47 PM  Attending Physician: Robyn Chaney*   Discharging Provider: Robyn Chaney MD  Primary Care Provider: Mima Lacey MD      HPI:   Ms. Johnson is a 36-year-old female with a medical history that includes anxiety, hydrocephalus s/p noncommunicating  shunt and hypertension. She was brought to the ED via EMS from home after family reported that the patient became combative with bizarre behavior and yelling. She arrived in 4 point restraints. The family denies any prior psychiatric history. On arrival to the ED she was found to be slightly hypertensive 's. Initial labs were remarkable for K 3.4, + benzo UDS and + nitrites in urine with 3+ leukocytes. A CT of the head was negative, COVID19 neg and ETOH neg. In the ED she was given IV Benadryl, Haldol, Ativan and Rocephin. She was initially placed under PEC with tele-psych consulted but they recommended medical evaluation for cause of psychosis. PEC was rescinded. Upon my assessment she was now AAOx4. She states she remembers being altered and was with a similar episode in the past. She was admitted in august of 2021 to [facility] with similar symptoms and treated with abx for a UTI as well as a valium taper for catatonia with subsequent improvement in her mental status. Her EEG was noted to be without seizure activity at that time and her CTH and MRI brain were without acute intracranial abnormalities. She represented to [place] last week with similar presentation and placed on Valium taper but has been unable to obtain Valium from pharmacy since discharge. Previous documented urine culture grew E. Coli sensitive to Rocephin. She will admit to [facility] hospital medicine service for  continued monitoring and treatment.                 * No surgery found *      Hospital Course:   Admitted with encephalopathy, UA positive and urine culture with E coli started on ceftriaxone.  Patient MET on 12/28-likely due to catatonia and responded to low-dose Ativan.  Patient requesting to be discharged AMA as she has Rituxan scheduled for tomorrow at [facility].  Educated patient on positive urine culture pending susceptibility.  Will discharge on p.o. antibiotics       Goals of Care Treatment Preferences:  Code Status: Full Code      Consults:   Consults (From admission, onward)          Status Ordering Provider     Inpatient consult to Neurology  Once        Provider:  (Not yet assigned)    Completed ELY DUNCAN     Inpatient consult to Psychiatry  Once        Provider:  (Not yet assigned)    Completed QUANG DAVISON     Inpatient consult to Social Work  Once        Provider:  (Not yet assigned)    Acknowledged QUANG DAVISON     Inpatient consult to Telemedicine - Psychiatry (Now & Every 24 Hours)  Now then every 24 hours      Provider:  (Not yet assigned)    Acknowledged ARELI JASON     Inpatient consult to Telemedicine - Psychiatry  Once        Provider:  (Not yet assigned)    Acknowledged CYNTHIA HOWARD            * Acute encephalopathy  Presented combative with altered mentation. CT head neg. COVID19 neg. UDS + benzo. Urine with + nitrites and 3+ leukocytes.   Urine culture with presumptive E.coli  Psych assessed and made recs, according to her she has been off benzo  Continue ceftriaxone   folate, B12, RPR, ammonia    Psych consult- :  D/C  valium taper    [5mg tid]  Continue home medication dose of gabapentin once reconciled (900 mg tid ?)  begin Depakene (liquid)or Depakote DR for seizure prophylaxis/catatonia prophylaxis  250 mg po bid x 7 days  125 mg po bid x 7 days, then stop  f/u outpatient rheumatology/neurology/PCP        Sjogren's syndrome with peripheral nervous system  involvement  Recently diagnosed early part of 2021  Unable to ambulate without walker and motorized wheelchair  Consult PT/OT  Pain in hands and feet, restart gabapentin      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   Urine culture- E. Coli. 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 [Q03.1] 12/27/2021 Not Applicable     Chronic    Sjogren's syndrome with peripheral nervous system involvement [M35.06] 12/27/2021 Yes     Chronic    S/P  shunt [Z98.2] 08/27/2014 Not Applicable      Problems Resolved During this Admission:    Diagnosis Date Noted Date Resolved POA    Psychosis [F29] 12/27/2021 12/27/2021 Unknown    UTI (urinary tract infection) [N39.0] 12/27/2021 12/27/2021 Yes    Hydrocephalus in adult [G91.9] 07/24/2014 12/27/2021 Yes       Discharged Condition: stable    Disposition: Home-Health Care [facility]    Follow Up:   Follow-up Information       Mima Lacey MD.    Specialty: Family Medicine  Contact information:  [address] [place] [address]  [place] [address] [phone]               Follow up On 12/29/2021.    Why: 7:15 am - 6 hr IV Infusion -- [facility]                            Patient Instructions:      Diet Adult Regular     Activity as tolerated       Significant Diagnostic Studies: {diagnostics:[id number]  Pending Diagnostic Studies:       None           Medications:  Reconciled Home Medications:      Medication List        START taking these medications       ciprofloxacin HCl 500 MG tablet  Commonly known as: CIPRO  Take 1 tablet (500 mg total) by mouth 2 (two) times daily. for 5 days     gabapentin 300 MG capsule  Commonly known as: NEURONTIN  Take 1 capsule (300 mg total) by mouth 3 (three) times daily.     * valproic acid (as sodium salt) 250 mg/5 mL (5 mL) Soln  Commonly known as: DEPAKENE  Take 5 mLs (250 mg total) by mouth every 12 (twelve) hours. for 14 days     * valproate 250 mg/5 mL syrup  Commonly known as: DEPAKENE  Take 5 mL (one teaspoonful) by mouth twice daily for 7 days, then 2.5 mL twice daily for 7 days           * This list has 2 medication(s) that are the same as other medications prescribed for you. Read the directions carefully, and ask your doctor or other care provider to review them with you.                CHANGE how you take these medications      hydrOXYzine pamoate 25 MG Cap  Commonly known as: VISTARIL  Take 1 capsule (25 mg total) by mouth every 8 (eight) hours as needed.  What changed:   when to take this  reasons to take this     ondansetron 8 MG Tbdl  Commonly known as: ZOFRAN-ODT  Take 1 tablet (8 mg total) by mouth every 8 (eight) hours as needed.  What changed:   medication strength  how much to take  when to take this  reasons to take this            CONTINUE taking these medications      b complex vitamins tablet  Take 1 tablet by mouth once daily.     famotidine 20 MG tablet  Commonly known as: PEPCID  Take 1 tablet (20 mg total) by mouth 2 (two) times daily.     ferrous sulfate 325 mg (65 mg iron) Tab tablet  Commonly known as: FEOSOL  Take 325 mg by mouth daily with breakfast.     meclizine 25 mg tablet  Commonly known as: ANTIVERT  Take 1 tablet (25 mg total) by mouth 3 (three) times daily as needed for Dizziness.     promethazine 25 MG tablet  Commonly known as: PHENERGAN  Take 1 tablet (25 mg total) by mouth every 6 (six) hours as needed for Nausea.     sertraline 100 MG tablet  Commonly known as: ZOLOFT  1 tablet EVERY  PM (route: oral)            STOP taking these medications      EScitalopram oxalate 20 MG tablet  Commonly known as: LEXAPRO     hydrOXYzine HCL 25 MG tablet  Commonly known as: ATARAX     propranoloL 40 MG tablet  Commonly known as: INDERAL     sucralfate 100 mg/mL suspension  Commonly known as: CARAFATE              Indwelling Lines/Drains at time of discharge:   Lines/Drains/Airways       None                   Time spent on the discharge of patient: 35 minutes         Robyn Chaney MD  Department of Hospital Medicine  [facility] Surg

## 2021-12-30 NOTE — PLAN OF CARE
Patient has received discharge instructions. Prescriptions received. Instructions reviewed with pt using teachback method. All questions answered to pt satisfaction. Any non-implanted  IV access and telemetry present,  have been  removed per bedside nurse. Transport arranged  for discharge.

## 2022-02-05 ENCOUNTER — HOSPITAL ENCOUNTER (OUTPATIENT)
Facility: HOSPITAL | Age: 37
Discharge: HOME | End: 2022-02-06
Attending: EMERGENCY MEDICINE | Admitting: HOSPITALIST
Payer: MEDICAID

## 2022-02-05 DIAGNOSIS — R41.82 ALTERED MENTAL STATUS: ICD-10-CM

## 2022-02-05 DIAGNOSIS — G04.81 AUTOIMMUNE ENCEPHALITIS: ICD-10-CM

## 2022-02-05 DIAGNOSIS — G93.40 ACUTE ENCEPHALOPATHY: Primary | ICD-10-CM

## 2022-02-05 DIAGNOSIS — E86.0 DEHYDRATION: ICD-10-CM

## 2022-02-05 PROBLEM — R29.6 FREQUENT FALLS: Status: ACTIVE | Noted: 2022-02-05

## 2022-02-05 LAB
ALBUMIN SERPL BCP-MCNC: 4.9 G/DL (ref 3.5–5.2)
ALP SERPL-CCNC: 88 U/L (ref 55–135)
ALT SERPL W/O P-5'-P-CCNC: 18 U/L (ref 10–44)
AMPHET+METHAMPHET UR QL: NEGATIVE
ANION GAP SERPL CALC-SCNC: 16 MMOL/L (ref 8–16)
AST SERPL-CCNC: 24 U/L (ref 10–40)
B-HCG UR QL: NEGATIVE
BACTERIA #/AREA URNS HPF: NORMAL /HPF
BARBITURATES UR QL SCN>200 NG/ML: NEGATIVE
BASOPHILS # BLD AUTO: 0.02 K/UL (ref 0–0.2)
BASOPHILS NFR BLD: 0.2 % (ref 0–1.9)
BENZODIAZ UR QL SCN>200 NG/ML: ABNORMAL
BILIRUB SERPL-MCNC: 0.9 MG/DL (ref 0.1–1)
BILIRUB UR QL STRIP: NEGATIVE
BUN SERPL-MCNC: 14 MG/DL (ref 6–20)
BZE UR QL SCN: NEGATIVE
CALCIUM SERPL-MCNC: 10 MG/DL (ref 8.7–10.5)
CANNABINOIDS UR QL SCN: NEGATIVE
CHLORIDE SERPL-SCNC: 103 MMOL/L (ref 95–110)
CLARITY UR: CLEAR
CO2 SERPL-SCNC: 19 MMOL/L (ref 23–29)
COLOR UR: YELLOW
CREAT SERPL-MCNC: 0.8 MG/DL (ref 0.5–1.4)
CREAT UR-MCNC: 145.2 MG/DL (ref 15–325)
CTP QC/QA: YES
DIFFERENTIAL METHOD: ABNORMAL
EOSINOPHIL # BLD AUTO: 0 K/UL (ref 0–0.5)
EOSINOPHIL NFR BLD: 0.2 % (ref 0–8)
ERYTHROCYTE [DISTWIDTH] IN BLOOD BY AUTOMATED COUNT: 12.9 % (ref 11.5–14.5)
EST. GFR  (AFRICAN AMERICAN): >60 ML/MIN/1.73 M^2
EST. GFR  (NON AFRICAN AMERICAN): >60 ML/MIN/1.73 M^2
GLUCOSE SERPL-MCNC: 104 MG/DL (ref 70–110)
GLUCOSE UR QL STRIP: NEGATIVE
HCT VFR BLD AUTO: 42.5 % (ref 37–48.5)
HGB BLD-MCNC: 14.4 G/DL (ref 12–16)
HGB UR QL STRIP: NEGATIVE
HYALINE CASTS #/AREA URNS LPF: 0 /LPF
IMM GRANULOCYTES # BLD AUTO: 0.02 K/UL (ref 0–0.04)
IMM GRANULOCYTES NFR BLD AUTO: 0.2 % (ref 0–0.5)
KETONES UR QL STRIP: ABNORMAL
LEUKOCYTE ESTERASE UR QL STRIP: NEGATIVE
LYMPHOCYTES # BLD AUTO: 1.3 K/UL (ref 1–4.8)
LYMPHOCYTES NFR BLD: 14.2 % (ref 18–48)
MCH RBC QN AUTO: 29.8 PG (ref 27–31)
MCHC RBC AUTO-ENTMCNC: 33.9 G/DL (ref 32–36)
MCV RBC AUTO: 88 FL (ref 82–98)
METHADONE UR QL SCN>300 NG/ML: NEGATIVE
MICROSCOPIC COMMENT: NORMAL
MONOCYTES # BLD AUTO: 0.7 K/UL (ref 0.3–1)
MONOCYTES NFR BLD: 7.4 % (ref 4–15)
NEUTROPHILS # BLD AUTO: 7 K/UL (ref 1.8–7.7)
NEUTROPHILS NFR BLD: 77.8 % (ref 38–73)
NITRITE UR QL STRIP: NEGATIVE
NRBC BLD-RTO: 0 /100 WBC
OPIATES UR QL SCN: NEGATIVE
PCP UR QL SCN>25 NG/ML: NEGATIVE
PH UR STRIP: 8 [PH] (ref 5–8)
PLATELET # BLD AUTO: 240 K/UL (ref 150–450)
PMV BLD AUTO: 10.6 FL (ref 9.2–12.9)
POTASSIUM SERPL-SCNC: 4.5 MMOL/L (ref 3.5–5.1)
PROT SERPL-MCNC: 8.1 G/DL (ref 6–8.4)
PROT UR QL STRIP: ABNORMAL
RBC # BLD AUTO: 4.83 M/UL (ref 4–5.4)
RBC #/AREA URNS HPF: 1 /HPF (ref 0–4)
SARS-COV-2 RDRP RESP QL NAA+PROBE: NEGATIVE
SODIUM SERPL-SCNC: 138 MMOL/L (ref 136–145)
SP GR UR STRIP: 1.02 (ref 1–1.03)
SQUAMOUS #/AREA URNS HPF: 1 /HPF
TOXICOLOGY INFORMATION: ABNORMAL
TSH SERPL DL<=0.005 MIU/L-ACNC: 2.08 UIU/ML (ref 0.4–4)
URN SPEC COLLECT METH UR: ABNORMAL
UROBILINOGEN UR STRIP-ACNC: NEGATIVE EU/DL
WBC # BLD AUTO: 9.02 K/UL (ref 3.9–12.7)
WBC #/AREA URNS HPF: 1 /HPF (ref 0–5)

## 2022-02-05 PROCEDURE — 93005 ELECTROCARDIOGRAM TRACING: CPT

## 2022-02-05 PROCEDURE — 25000003 PHARM REV CODE 250: Performed by: PHYSICIAN ASSISTANT

## 2022-02-05 PROCEDURE — 80053 COMPREHEN METABOLIC PANEL: CPT | Performed by: EMERGENCY MEDICINE

## 2022-02-05 PROCEDURE — 85025 COMPLETE CBC W/AUTO DIFF WBC: CPT | Performed by: EMERGENCY MEDICINE

## 2022-02-05 PROCEDURE — 63600175 PHARM REV CODE 636 W HCPCS: Performed by: EMERGENCY MEDICINE

## 2022-02-05 PROCEDURE — 93010 ELECTROCARDIOGRAM REPORT: CPT | Mod: ,,, | Performed by: INTERNAL MEDICINE

## 2022-02-05 PROCEDURE — 96360 HYDRATION IV INFUSION INIT: CPT

## 2022-02-05 PROCEDURE — G0378 HOSPITAL OBSERVATION PER HR: HCPCS

## 2022-02-05 PROCEDURE — 96361 HYDRATE IV INFUSION ADD-ON: CPT

## 2022-02-05 PROCEDURE — 93010 EKG 12-LEAD: ICD-10-PCS | Mod: ,,, | Performed by: INTERNAL MEDICINE

## 2022-02-05 PROCEDURE — 81025 URINE PREGNANCY TEST: CPT | Performed by: EMERGENCY MEDICINE

## 2022-02-05 PROCEDURE — 80307 DRUG TEST PRSMV CHEM ANLYZR: CPT | Performed by: EMERGENCY MEDICINE

## 2022-02-05 PROCEDURE — 81000 URINALYSIS NONAUTO W/SCOPE: CPT | Mod: 59 | Performed by: EMERGENCY MEDICINE

## 2022-02-05 PROCEDURE — 99285 EMERGENCY DEPT VISIT HI MDM: CPT | Mod: 25

## 2022-02-05 PROCEDURE — U0002 COVID-19 LAB TEST NON-CDC: HCPCS | Performed by: EMERGENCY MEDICINE

## 2022-02-05 PROCEDURE — 84443 ASSAY THYROID STIM HORMONE: CPT | Performed by: EMERGENCY MEDICINE

## 2022-02-05 RX ORDER — TALC
9 POWDER (GRAM) TOPICAL NIGHTLY PRN
Status: DISCONTINUED | OUTPATIENT
Start: 2022-02-05 | End: 2022-02-06 | Stop reason: HOSPADM

## 2022-02-05 RX ORDER — SERTRALINE HYDROCHLORIDE 100 MG/1
100 TABLET, FILM COATED ORAL DAILY
Status: DISCONTINUED | OUTPATIENT
Start: 2022-02-06 | End: 2022-02-06 | Stop reason: HOSPADM

## 2022-02-05 RX ORDER — IPRATROPIUM BROMIDE AND ALBUTEROL SULFATE 2.5; .5 MG/3ML; MG/3ML
3 SOLUTION RESPIRATORY (INHALATION) EVERY 4 HOURS PRN
Status: DISCONTINUED | OUTPATIENT
Start: 2022-02-05 | End: 2022-02-06 | Stop reason: HOSPADM

## 2022-02-05 RX ORDER — LANOLIN ALCOHOL/MO/W.PET/CERES
1 CREAM (GRAM) TOPICAL
Status: DISCONTINUED | OUTPATIENT
Start: 2022-02-06 | End: 2022-02-06 | Stop reason: HOSPADM

## 2022-02-05 RX ORDER — POLYETHYLENE GLYCOL 3350 17 G/17G
17 POWDER, FOR SOLUTION ORAL 2 TIMES DAILY PRN
Status: DISCONTINUED | OUTPATIENT
Start: 2022-02-05 | End: 2022-02-06 | Stop reason: HOSPADM

## 2022-02-05 RX ORDER — ONDANSETRON 8 MG/1
8 TABLET, ORALLY DISINTEGRATING ORAL EVERY 8 HOURS PRN
Status: DISCONTINUED | OUTPATIENT
Start: 2022-02-05 | End: 2022-02-06 | Stop reason: HOSPADM

## 2022-02-05 RX ORDER — IBUPROFEN 200 MG
24 TABLET ORAL
Status: DISCONTINUED | OUTPATIENT
Start: 2022-02-05 | End: 2022-02-06 | Stop reason: HOSPADM

## 2022-02-05 RX ORDER — NALOXONE HCL 0.4 MG/ML
0.02 VIAL (ML) INJECTION
Status: DISCONTINUED | OUTPATIENT
Start: 2022-02-05 | End: 2022-02-06 | Stop reason: HOSPADM

## 2022-02-05 RX ORDER — ACETAMINOPHEN 325 MG/1
650 TABLET ORAL EVERY 4 HOURS PRN
Status: DISCONTINUED | OUTPATIENT
Start: 2022-02-05 | End: 2022-02-06 | Stop reason: HOSPADM

## 2022-02-05 RX ORDER — GABAPENTIN 300 MG/1
300 CAPSULE ORAL 3 TIMES DAILY
Status: DISCONTINUED | OUTPATIENT
Start: 2022-02-05 | End: 2022-02-06

## 2022-02-05 RX ORDER — BISACODYL 10 MG
10 SUPPOSITORY, RECTAL RECTAL DAILY PRN
Status: DISCONTINUED | OUTPATIENT
Start: 2022-02-05 | End: 2022-02-06 | Stop reason: HOSPADM

## 2022-02-05 RX ORDER — MECLIZINE HYDROCHLORIDE 25 MG/1
25 TABLET ORAL 3 TIMES DAILY PRN
Status: DISCONTINUED | OUTPATIENT
Start: 2022-02-05 | End: 2022-02-06 | Stop reason: HOSPADM

## 2022-02-05 RX ORDER — IBUPROFEN 200 MG
16 TABLET ORAL
Status: DISCONTINUED | OUTPATIENT
Start: 2022-02-05 | End: 2022-02-06 | Stop reason: HOSPADM

## 2022-02-05 RX ORDER — GLUCAGON 1 MG
1 KIT INJECTION
Status: DISCONTINUED | OUTPATIENT
Start: 2022-02-05 | End: 2022-02-06 | Stop reason: HOSPADM

## 2022-02-05 RX ADMIN — SODIUM CHLORIDE, SODIUM LACTATE, POTASSIUM CHLORIDE, AND CALCIUM CHLORIDE 1000 ML: .6; .31; .03; .02 INJECTION, SOLUTION INTRAVENOUS at 11:02

## 2022-02-05 RX ADMIN — Medication 9 MG: at 09:02

## 2022-02-05 RX ADMIN — GABAPENTIN 300 MG: 300 CAPSULE ORAL at 09:02

## 2022-02-05 NOTE — SUBJECTIVE & OBJECTIVE
Past Medical History:   Diagnosis Date    Allergy     Anxiety     Hydrocephalus     Hypertension      (ventriculoperitoneal) shunt status     not active currently       Past Surgical History:   Procedure Laterality Date     SECTION      VENTRICULOPERITONEAL SHUNT         Review of patient's allergies indicates:   Allergen Reactions    Sulfa (sulfonamide antibiotics) Shortness Of Breath and Swelling       No current facility-administered medications on file prior to encounter.     Current Outpatient Medications on File Prior to Encounter   Medication Sig    b complex vitamins tablet Take 1 tablet by mouth once daily.    famotidine (PEPCID) 20 MG tablet Take 1 tablet (20 mg total) by mouth 2 (two) times daily.    ferrous sulfate 325 mg (65 mg iron) Tab tablet Take 325 mg by mouth daily with breakfast.    gabapentin (NEURONTIN) 300 MG capsule Take 1 capsule (300 mg total) by mouth 3 (three) times daily.    hydrOXYzine pamoate (VISTARIL) 25 MG Cap Take 1 capsule (25 mg total) by mouth every 8 (eight) hours as needed.    meclizine (ANTIVERT) 25 mg tablet Take 1 tablet (25 mg total) by mouth 3 (three) times daily as needed for Dizziness.    ondansetron (ZOFRAN-ODT) 8 MG TbDL Dissolve 1 tablet (8 mg total) by mouth every 8 (eight) hours as needed.    promethazine (PHENERGAN) 25 MG tablet Take 1 tablet (25 mg total) by mouth every 6 (six) hours as needed for Nausea.    sertraline (ZOLOFT) 100 MG tablet 1 tablet EVERY PM (route: oral)    valproate (DEPAKENE) 250 mg/5 mL syrup Take 5 mL (one teaspoonful) by mouth twice daily for 7 days, then 2.5 mL twice daily for 7 days     Family History     Problem Relation (Age of Onset)    Diabetes Father    Heart disease Father    Hypertension Mother    Mental illness Father        Tobacco Use    Smoking status: Former Smoker    Smokeless tobacco: Never Used   Substance and Sexual Activity    Alcohol use: Yes     Comment: occ    Drug use: No    Sexual  activity: Yes     Partners: Male     Birth control/protection: None     Review of Systems   Unable to perform ROS: Mental status change     Objective:     Vital Signs (Most Recent):  Temp: 98.8 °F (37.1 °C) (02/05/22 0907)  Pulse: (!) 114 (02/05/22 1502)  Resp: (!) 35 (02/05/22 1012)  BP: 135/77 (02/05/22 1502)  SpO2: 100 % (02/05/22 1502) Vital Signs (24h Range):  Temp:  [98.8 °F (37.1 °C)] 98.8 °F (37.1 °C)  Pulse:  [102-114] 114  Resp:  [20-35] 35  SpO2:  [99 %-100 %] 100 %  BP: (131-162)/(70-93) 135/77        There is no height or weight on file to calculate BMI.    Physical Exam  Vitals and nursing note reviewed.   Constitutional:       Appearance: She is well-developed and well-nourished.   HENT:      Head: Normocephalic and atraumatic.   Eyes:      Extraocular Movements: EOM normal.      Pupils: Pupils are equal, round, and reactive to light.   Neck:      Thyroid: No thyromegaly.      Trachea: No tracheal deviation.   Cardiovascular:      Rate and Rhythm: Regular rhythm. Tachycardia present.      Heart sounds: No murmur heard.      Pulmonary:      Effort: Pulmonary effort is normal.      Breath sounds: Normal breath sounds. No wheezing.   Abdominal:      General: Bowel sounds are normal.      Palpations: Abdomen is soft.      Tenderness: There is no abdominal tenderness.   Musculoskeletal:         General: No tenderness or edema. Normal range of motion.      Cervical back: Normal range of motion and neck supple.   Lymphadenopathy:      Cervical: No cervical adenopathy.   Skin:     General: Skin is warm and dry.   Neurological:      General: No focal deficit present.      Mental Status: She is alert and oriented to person, place, and time.      Cranial Nerves: No cranial nerve deficit.      Deep Tendon Reflexes: Reflexes are normal and symmetric.   Psychiatric:         Mood and Affect: Mood and affect normal. Affect is flat.         Behavior: Behavior is slowed.      Comments: Pt in fetal position making  "limited eye contact.  Oriented to person, place and time.  Pt cannot tell me what brought her to the hospital stating "it's not because I'm pregnant and it's not because I'm cold."           CRANIAL NERVES     CN III, IV, VI   Pupils are equal, round, and reactive to light.  Extraocular motions are normal.        Significant Labs: All pertinent labs within the past 24 hours have been reviewed.    Significant Imaging: I have reviewed all pertinent imaging results/findings within the past 24 hours.  "

## 2022-02-05 NOTE — ASSESSMENT & PLAN NOTE
PT/OT evaluated last admission 12/26-12/30 and recommend outpatient PT  Will consult PT/OT to reassess

## 2022-02-05 NOTE — H&P
[place] [facility] Medicine  History & Physical    Patient Name: Lore Johnson  MRN: [id number]  Patient Class: Emergency  Admission Date: 2/5/2022  Attending Physician: Denae Ring MD   Primary Care Provider: Mima Lacey MD         Patient information was obtained from relative(s), past medical records and ER records.     Subjective:     Principal Problem:Acute encephalopathy    Chief Complaint:   Chief Complaint   Patient presents with    Altered Mental Status     Pt called 911 and stated she could not go to the bathroom. Pt's mother was on scene and reports she has had episodes like this prior with being combative and refusing to speak. Accu check 160. Pt awake and alert but not speaking on arrival.         HPI: 36 y.o. female with significant past medical history of anxiety, HTN, Hydrocephalus s/p  (ventriculoperitoneal) shunt status, and recent diagnosis of Sjogren's syndrome 2021 on infusions at [facility] presented to the ER with complaints of AMS.  History provided by pt's mother.  Pt lives with her mother who reports increased agitation over the past week and worsening.  3 days prior to admission pt stopped talking and remained in bed constantly.  This morning a  was called by the patient, mother not aware, and the patient could not be found in the home.  Pt was found at the neighbor's house and reportedly walked there barefoot with no coat or sweater.  Pt's mother reports she missed last infusion for sjogren's due to hospitalization at [place] 12/26-12/30.  Pt's mother denies head trauma or LOC.  She did hit her head a week ago.  At baseline, pt can perform all ADLs.  She has a motorized wheelchair but can walk.  Pt's mother denies seizure like activity or post-ictal period.  Pt's mother denies diaphoresis, fever, nasal congestion, chest pain, loss of vision, slurred speech, focal weakness, vomiting, and incontinence.  No recent sick contacts.  Pt is not vaccinated against  COVID.  Pt's mother denies alcohol or drug use.  Pt's mother confirms previous admissions for encephalopathy.  She was admitted in 2021 to [facility] with similar symptoms and treated with abx for a UTI as well as a valium taper for catatonia with subsequent improvement in her mental status. Admission at [facility] - encephalopathy attributed to UTIs, became catatonic and discharged on valium taper.      In the ED, VSS tachycardic (114), tachypneic (35) and hypertensive (162/93).  CMP unremarkable.  CBC unremarkable.  UA +Ketones and protein only.  Drug screen positive for benzodiazepines.  Imaging CT head unchanged from previous, notes with large cystic fluid. No change in stent placement. CXR no acute cardiopulmonary process.       Past Medical History:   Diagnosis Date    Allergy     Anxiety     Hydrocephalus     Hypertension      (ventriculoperitoneal) shunt status     not active currently       Past Surgical History:   Procedure Laterality Date     SECTION      VENTRICULOPERITONEAL SHUNT         Review of patient's allergies indicates:   Allergen Reactions    Sulfa (sulfonamide antibiotics) Shortness Of Breath and Swelling       No current facility-administered medications on file prior to encounter.     Current Outpatient Medications on File Prior to Encounter   Medication Sig    b complex vitamins tablet Take 1 tablet by mouth once daily.    famotidine (PEPCID) 20 MG tablet Take 1 tablet (20 mg total) by mouth 2 (two) times daily.    ferrous sulfate 325 mg (65 mg iron) Tab tablet Take 325 mg by mouth daily with breakfast.    gabapentin (NEURONTIN) 300 MG capsule Take 1 capsule (300 mg total) by mouth 3 (three) times daily.    hydrOXYzine pamoate (VISTARIL) 25 MG Cap Take 1 capsule (25 mg total) by mouth every 8 (eight) hours as needed.    meclizine (ANTIVERT) 25 mg tablet Take 1 tablet (25 mg total) by mouth 3 (three) times daily as needed for  Dizziness.    ondansetron (ZOFRAN-ODT) 8 MG TbDL Dissolve 1 tablet (8 mg total) by mouth every 8 (eight) hours as needed.    promethazine (PHENERGAN) 25 MG tablet Take 1 tablet (25 mg total) by mouth every 6 (six) hours as needed for Nausea.    sertraline (ZOLOFT) 100 MG tablet 1 tablet EVERY PM (route: oral)    valproate (DEPAKENE) 250 mg/5 mL syrup Take 5 mL (one teaspoonful) by mouth twice daily for 7 days, then 2.5 mL twice daily for 7 days     Family History     Problem Relation (Age of Onset)    Diabetes Father    Heart disease Father    Hypertension Mother    Mental illness Father        Tobacco Use    Smoking status: Former Smoker    Smokeless tobacco: Never Used   Substance and Sexual Activity    Alcohol use: Yes     Comment: occ    Drug use: No    Sexual activity: Yes     Partners: Male     Birth control/protection: None     Review of Systems   Unable to perform ROS: Mental status change     Objective:     Vital Signs (Most Recent):  Temp: 98.8 °F (37.1 °C) (02/05/22 0907)  Pulse: (!) 114 (02/05/22 1502)  Resp: (!) 35 (02/05/22 1012)  BP: 135/77 (02/05/22 1502)  SpO2: 100 % (02/05/22 1502) Vital Signs (24h Range):  Temp:  [98.8 °F (37.1 °C)] 98.8 °F (37.1 °C)  Pulse:  [102-114] 114  Resp:  [20-35] 35  SpO2:  [99 %-100 %] 100 %  BP: (131-162)/(70-93) 135/77        There is no height or weight on file to calculate BMI.    Physical Exam  Vitals and nursing note reviewed.   Constitutional:       Appearance: She is well-developed and well-nourished.   HENT:      Head: Normocephalic and atraumatic.   Eyes:      Extraocular Movements: EOM normal.      Pupils: Pupils are equal, round, and reactive to light.   Neck:      Thyroid: No thyromegaly.      Trachea: No tracheal deviation.   Cardiovascular:      Rate and Rhythm: Regular rhythm. Tachycardia present.      Heart sounds: No murmur heard.      Pulmonary:      Effort: Pulmonary effort is normal.      Breath sounds: Normal breath sounds. No wheezing.  "  Abdominal:      General: Bowel sounds are normal.      Palpations: Abdomen is soft.      Tenderness: There is no abdominal tenderness.   Musculoskeletal:         General: No tenderness or edema. Normal range of motion.      Cervical back: Normal range of motion and neck supple.   Lymphadenopathy:      Cervical: No cervical adenopathy.   Skin:     General: Skin is warm and dry.   Neurological:      General: No focal deficit present.      Mental Status: She is alert and oriented to person, place, and time.      Cranial Nerves: No cranial nerve deficit.      Deep Tendon Reflexes: Reflexes are normal and symmetric.   Psychiatric:         Mood and Affect: Mood and affect normal. Affect is flat.         Behavior: Behavior is slowed.      Comments: Pt in fetal position making limited eye contact.  Oriented to person, place and time.  Pt cannot tell me what brought her to the hospital stating "it's not because I'm pregnant and it's not because I'm cold."           CRANIAL NERVES     CN III, IV, VI   Pupils are equal, round, and reactive to light.  Extraocular motions are normal.        Significant Labs: All pertinent labs within the past 24 hours have been reviewed.    Significant Imaging: I have reviewed all pertinent imaging results/findings within the past 24 hours.    Assessment/Plan:     * Acute encephalopathy  Differential includes Seizure/post-ictal and Traumatic(LOS time documented unknown min), psychosis  Workup Blood Chemistries, Consult to Neurology, Neuro-Imaging (MRI/CT), Toxic metabolite eval, Urine Drug screen and Consult to psych   -CT with No significant change as compared to the previous examination.  Enlargement of the ventricular system with associated large cystic fluid collection in the posterior fossa.  No change or migration of the tubing placed within the cystic collection in the posterior fossa.  -workup negative for infection thus far.  COVID negative, CXR unremarkable, UA with no evidence of " infection.  Recent admission 12/26-12/30 pt with UTI with improvement in symptoms after treatment with antibiotic however pt also improved with valium.  Past EEG's unremarkable.        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

## 2022-02-05 NOTE — HPI
36 y.o. female with significant past medical history of anxiety, HTN, Hydrocephalus s/p  (ventriculoperitoneal) shunt status, and recent diagnosis of Sjogren's syndrome 2021 on infusions at [facility] presented to the ER with complaints of AMS.  History provided by pt's mother.  Pt lives with her mother who reports increased agitation over the past week and worsening.  3 days prior to admission pt stopped talking and remained in bed constantly.  This morning a  was called by the patient, mother not aware, and the patient could not be found in the home.  Pt was found at the neighbor's house and reportedly walked there barefoot with no coat or sweater.  Pt's mother reports she missed last infusion for sjogren's due to hospitalization at [place] 12/26-12/30.  Pt's mother denies head trauma or LOC.  She did hit her head a week ago.  At baseline, pt can perform all ADLs.  She has a motorized wheelchair but can walk.  Pt's mother denies seizure like activity or post-ictal period.  Pt's mother denies diaphoresis, fever, nasal congestion, chest pain, loss of vision, slurred speech, focal weakness, vomiting, and incontinence.  No recent sick contacts.  Pt is not vaccinated against COVID.  Pt's mother denies alcohol or drug use.  Pt's mother confirms previous admissions for encephalopathy.  She was admitted in august of 2021 to [facility] with similar symptoms and treated with abx for a UTI as well as a valium taper for catatonia with subsequent improvement in her mental status. Admission at [facility] 12/26-12/30 encephalopathy attributed to UTIs, became catatonic and discharged on valium taper.      In the ED, VSS tachycardic (114), tachypneic (35) and hypertensive (162/93).  CMP unremarkable.  CBC unremarkable.  UA +Ketones and protein only.  Drug screen positive for benzodiazepines.  Imaging CT head unchanged from previous, notes with large cystic fluid. No change in stent placement. CXR no acute  cardiopulmonary process.

## 2022-02-05 NOTE — ED PROVIDER NOTES
Encounter Date: 2022    SCRIBE #1 NOTE: I, Warner De Anda, am scribing for, and in the presence of, Denae Ring MD.       History     Chief Complaint   Patient presents with    Altered Mental Status     Pt called 911 and stated she could not go to the bathroom. Pt's mother was on scene and reports she has had episodes like this prior with being combative and refusing to speak. Accu check 160. Pt awake and alert but not speaking on arrival.      Lore Johnson is a 36 y.o. woman who  has a past medical history of Allergy, Anxiety, Hydrocephalus, Hypertension, and  (ventriculoperitoneal) shunt status.    The patient presents to the ED due to Altered Mental Status.   Patient nonverbal. Family reports she fell out of bed one week ago and hit her head.     Mother reported to EMS that she would talk or respond this morning.  She has been altered before when she had an occult UTI around [place] of last year.  She denied any specific localizing complaints to EMS.    The history is provided by a relative.     Review of patient's allergies indicates:   Allergen Reactions    Sulfa (sulfonamide antibiotics) Shortness Of Breath and Swelling     Past Medical History:   Diagnosis Date    Allergy     Anxiety     Hydrocephalus     Hypertension      (ventriculoperitoneal) shunt status     not active currently     Past Surgical History:   Procedure Laterality Date     SECTION      VENTRICULOPERITONEAL SHUNT       Family History   Problem Relation Age of Onset    Diabetes Father     Heart disease Father     Mental illness Father     Hypertension Mother     Breast cancer Neg Hx     Colon cancer Neg Hx     Ovarian cancer Neg Hx     Stroke Neg Hx      Social History     Tobacco Use    Smoking status: Former Smoker    Smokeless tobacco: Never Used   Substance Use Topics    Alcohol use: Yes     Comment: occ    Drug use: No     Review of Systems   Unable to perform ROS: Other   Constitutional:  Negative for appetite change, chills and fever.        Note that initially patient could not give a review of systems but as she became more alert she did answer yes no questions.  She denied review of systems but I would question the reliability   HENT: Negative for ear pain and sore throat.    Eyes: Negative for visual disturbance.   Respiratory: Negative for cough and shortness of breath.    Cardiovascular: Negative for chest pain.   Gastrointestinal: Negative for abdominal pain, diarrhea and vomiting.   Genitourinary: Negative for dysuria and hematuria.   Musculoskeletal: Negative for arthralgias.   Skin: Negative for rash.   Neurological: Negative for seizures, weakness and headaches.   Psychiatric/Behavioral: Positive for behavioral problems and confusion.       Physical Exam     Initial Vitals [02/05/22 0907]   BP Pulse Resp Temp SpO2   (!) 131/93 108 20 98.8 °F (37.1 °C) 100 %      MAP       --         Physical Exam    Nursing note and vitals reviewed.  Constitutional: She appears well-developed and well-nourished.   Patient curled in fetal position.  Eyes alert but does not respond to questions.   HENT:   Head: Normocephalic. Head is with abrasion (Lateral left eyebrow, healed).   Right Ear: Hearing and tympanic membrane normal.   Left Ear: Hearing and tympanic membrane normal.   Eyes: Conjunctivae and EOM are normal. Pupils are equal, round, and reactive to light.   Neck: Neck supple.   Normal range of motion.  Cardiovascular: Normal rate, regular rhythm, S1 normal, S2 normal, normal heart sounds and intact distal pulses. Exam reveals no gallop and no friction rub.    No murmur heard.  Pulmonary/Chest: Breath sounds normal.   Abdominal: Abdomen is soft. Bowel sounds are normal. She exhibits no distension. There is no abdominal tenderness. There is no rebound and no guarding.   Musculoskeletal:         General: No tenderness or edema.      Cervical back: Normal range of motion and neck supple.      Neurological: She is alert. She has normal strength and normal reflexes. She exhibits normal muscle tone.   Patient moves and pushes against exam.   Skin: Skin is warm and dry.         ED Course   Procedures  Labs Reviewed   CBC W/ AUTO DIFFERENTIAL - Abnormal; Notable for the following components:       Result Value    Gran % 77.8 (*)     Lymph % 14.2 (*)     All other components within normal limits   COMPREHENSIVE METABOLIC PANEL - Abnormal; Notable for the following components:    CO2 19 (*)     All other components within normal limits   DRUG SCREEN PANEL, URINE EMERGENCY - Abnormal; Notable for the following components:    Benzodiazepines Presumptive Positive (*)     All other components within normal limits    Narrative:     Specimen Source->Urine   URINALYSIS - Abnormal; Notable for the following components:    Protein, UA 1+ (*)     Ketones, UA 3+ (*)     All other components within normal limits    Narrative:     Specimen Source->Urine   TSH   PREGNANCY TEST, URINE RAPID    Narrative:     Specimen Source->Urine   URINALYSIS MICROSCOPIC    Narrative:     Specimen Source->Urine   SARS-COV-2 RDRP GENE          Imaging Results          X-Ray Chest AP Portable (Final result)  Result time 02/05/22 11:10:22    Final result by Eun Brumfield MD (02/05/22 11:10:22)                 Impression:      No radiographic evidence for acute cardiopulmonary disease.      Electronically signed by: Eun Brumfield MD  Date:    02/05/2022  Time:    11:10             Narrative:    EXAMINATION:  XR CHEST AP PORTABLE    CLINICAL HISTORY:  Altered mental status, unspecified    TECHNIQUE:  Single frontal view of the chest was performed.    COMPARISON:  02/26/2018    FINDINGS:  A right ventriculostomy tube is has been cut to terminate at the level of the right lung apex.  Is otherwise unremarkable.    The lungs are symmetrically hypoinflated which causes crowding of the bronchovascular bundles.  Otherwise the lung  parenchyma shows no mass, nodule, pneumothorax, airspace consolidation or pleural effusion.  The cardiomediastinal silhouette, osseous and soft tissue structures are normal.                               CT Head Without Contrast (Final result)  Result time 02/05/22 10:59:23    Final result by Austin Isidro MD (02/05/22 10:59:23)                 Impression:      No significant change as compared to the previous examination.    Enlargement of the ventricular system with associated large cystic fluid collection in the posterior fossa.    No change or migration of the tubing placed within the cystic collection in the posterior fossa.      Electronically signed by: Austin Isidro  Date:    02/05/2022  Time:    10:59             Narrative:    EXAMINATION:  CT HEAD WITHOUT CONTRAST    CLINICAL HISTORY:  Head trauma, abnormal mental status (Age 19-64y);head trauma 1 week ago ams today;    TECHNIQUE:  Low dose axial images were obtained through the head.  Coronal and sagittal reformations were also performed. Contrast was not administered.    COMPARISON:  December 26, 2021    FINDINGS:  The bone window portion of the examination reveals no apparent evidence of an acute injury.  Skull base is unremarkable.  The paranasal air passages are clear.  The mastoid air cells are also unremarkable.  There is shunt tubing placed into the posterior fossa via a posterior right approach which is draining a large cystic structure which was seen previously.  The size and contour of the cyst is consistent with the previous examination obtained in late December.    The lateral 3rd and 4th ventricles also are enlarged and stable in appearance as compared to the previous examination    The lateral projection indicates that there is thinning of the corpus callosum.  The mid brain brainstem and proximal spinal cord are unremarkable.  No indication of a suprasellar mass.  The pituitary and optic chiasm are unremarkable.  The contents of the  orbits appear unremarkable.                                 Medications   ferrous sulfate tablet 1 each (has no administration in time range)   gabapentin capsule 300 mg (has no administration in time range)   meclizine tablet 25 mg (has no administration in time range)   ondansetron disintegrating tablet 8 mg (has no administration in time range)   sertraline tablet 100 mg (has no administration in time range)   acetaminophen tablet 650 mg (has no administration in time range)   polyethylene glycol packet 17 g (has no administration in time range)   bisacodyL suppository 10 mg (has no administration in time range)   glucose chewable tablet 16 g (has no administration in time range)   glucose chewable tablet 24 g (has no administration in time range)   dextrose 50% injection 12.5 g (has no administration in time range)   dextrose 50% injection 25 g (has no administration in time range)   glucagon (human recombinant) injection 1 mg (has no administration in time range)   albuterol-ipratropium 2.5 mg-0.5 mg/3 mL nebulizer solution 3 mL (has no administration in time range)   melatonin tablet 9 mg (has no administration in time range)   naloxone 0.4 mg/mL injection 0.02 mg (has no administration in time range)   lactated ringers bolus 1,000 mL (0 mLs Intravenous Stopped 2/5/22 5674)     Medical Decision Making:   Initial Assessment:   This 36-year-old young woman with a history of hydrocephalus 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  Other than mild tachycardia her physical exam is normal.  We were unable to get any kind of history out of her.  She presented similarly around [place] when she had a UTI although she was agitated and combative at that time.  She does have a healing abrasion on her left eyebrow area that mother  reported to EMS was from a fall out of bed about a week ago  Differential Diagnosis:   Intracranial tumor, bleed, or infection.  Hydrocephalus.  General infection such as pneumonia or UTI.  Electrolyte abnormality.  Psychiatric disorder.  Will proceed with standard workup including CBC CMP chest x-ray and urinalysis.  Drug screen, alcohol level.  Will check head CT because of history of recent head trauma.  Will need to get cath urine sample since patient is not following instructions.  Clinical Tests:   Lab Tests: Reviewed and Ordered  Radiological Study: Reviewed and Ordered  Medical Tests: Reviewed and Ordered  ED Management:  Patient actually started to wake up prior to being given IV fluids and her evaluation was negative other than mild dehydration.  She seemed to be approaching close to her baseline mental status wide plan thus sent her home, but her mother reports that she is very concerned that she has had these repeated episodes and it sounds like and previous hospitalizations for altered mental status over recent months neurology has thought this was psych related, psych is soft this is medical related, in no and is able to get to the bottom of what is really going on.  Digging a little further very atypical almost psychogenic seeming weakness with how she presented with her autoimmune neuropathy.  Mom is also concerned because she has missed her last 2 doses of autoimmune infusion because she has been in the hospital.  So I am concerned that the patient may actually have an exacerbation of her autoimmune encephalitis.  I have asked the medical service to admit her for neurology and psychiatric consultation I anticipate they may repeat her neuro workup as well.          Scribe Attestation:   Scribe #1: I performed the above scribed service and the documentation accurately describes the services I performed. I attest to the accuracy of the note.        ED Course as of 02/05/22 1722   Sat Feb 05, 2022  "  1046 Ketones, UA(!): 3+  Consistent with dehydration but no other pathology appreciated [RH]   1046 CBC auto differential(!)  Within normal limits [RH]   1046 CO2(!): 19  Mild decrease no other significant abnormalities of CMP [RH]   1046 Comprehensive metabolic panel(!)  Otherwise unremarkable [RH]   1046 SARS-CoV-2 RNA, Amplification, Qual: Negative [RH]   1047 Preg Test, Ur: Negative [RH]   1128 X-Ray Chest AP Portable  Hypoinflation with  shunt visible no acute disease [RH]   1129 CT Head Without Contrast  Large fluid collection and posterior fossa with  shunt in place that appears stable from previous images no acute disease.  No evidence of traumatic damage [RH]   1130 Other than mild dehydration and mild acidosis not coming up with anything on metabolic evaluation. [RH]   1137 Went into room to re-examine patient.  She is wide awake and looking around.  She not appropriately but still unable or unwilling to speak.  Reassured her that her evaluation was normal except for some mild dehydration and that we will be giving her some IV fluid [RH]   1227 Benzodiazepines(!): Presumptive Positive  No other findings on UDS [RH]   1230 Patient is awake and alert and answering questions appropriately now.  Denies any specific complaints.  Still a little vague and tangential, when asked if she had any complaints she said "11. "Her room face is the ER room labeled 11. And then she admitted she was noting what she saw.  But she appears to be approaching her baseline [RH]   1340 Patient's mother is requesting tele psych consult this patient has had multiple episodes of going get into this unresponsive state.  It appears she has had neurology evaluations and EEGs which have been unremarkable.  She is basically refusing to take the patient home at this point and assists there is a psychiatric problem going on.  Trying to reach out to [facility] to talk with Neurology on-call [RH]   1345 Have call into our transfer " [place] to try and reach out to [facility] on-call [RH]   1402 Talking with neuro resident for [facility].  He was able to look up in their system and apparently patient sees a Dr. Aldana from Neurology who is the [facility] system which takes separate call.  I am paging Dr. Aldana now at [phone] [RH]   1500 Discussed with Dr. Aldana she is going to review records and then call me back [RH]   1516 Discussed with Dr. Aldana she mentioned the LP that was done about a year ago with a found some autoimmune encephalitis in her Sjogren's was diagnosed.  After running through the patient's clinical scenario over the past several months, she thinks this sounds more psychiatric.  If she is not quite back to baseline and isn't able to go home and take care of herself she recommends readmission and then repeating that a neuro evaluation and workup in going from there.  Discussed that with the Neuro resident on-call, he advises call medicine to admit [RH]   1522 Patient going to un referred team calling Dr. Dudley to admit page in [RH]   1529 Discussed with Dr. Dudley, unassigned [facility] who graciously agrees to admit for further care [RH]      ED Course User Index  [RH] Denae Ring MD             Clinical Impression:   Final diagnoses:  [R41.82] Altered mental status  [E86.0] Dehydration (Primary)  [G04.81] Autoimmune encephalitis          ED Disposition Condition    Observation           Physician Attestation for Scribe: I, Denae Ring MD, reviewed documentation as scribed in my presence, which is both accurate and complete.       Denae Ring MD  02/05/22 1700       Denae Ring MD  02/05/22 2936
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
## 2022-02-05 NOTE — ASSESSMENT & PLAN NOTE
Differential includes Seizure/post-ictal and Traumatic(LOS time documented unknown min), psychosis  Workup Blood Chemistries, Consult to Neurology, Neuro-Imaging (MRI/CT), Toxic metabolite eval, Urine Drug screen and Consult to psych   -CT with No significant change as compared to the previous examination.  Enlargement of the ventricular system with associated large cystic fluid collection in the posterior fossa.  No change or migration of the tubing placed within the cystic collection in the posterior fossa.  -workup negative for infection thus far.  COVID negative, CXR unremarkable, UA with no evidence of infection.  Recent admission 12/26-12/30 pt with UTI with improvement in symptoms after treatment with antibiotic however pt also improved with valium.  Past EEG's unremarkable.

## 2022-02-06 VITALS
BODY MASS INDEX: 25.76 KG/M2 | WEIGHT: 136.44 LBS | SYSTOLIC BLOOD PRESSURE: 147 MMHG | TEMPERATURE: 99 F | HEART RATE: 89 BPM | RESPIRATION RATE: 18 BRPM | DIASTOLIC BLOOD PRESSURE: 110 MMHG | OXYGEN SATURATION: 99 % | HEIGHT: 61 IN

## 2022-02-06 LAB
ALBUMIN SERPL BCP-MCNC: 4.4 G/DL (ref 3.5–5.2)
ALP SERPL-CCNC: 76 U/L (ref 55–135)
ALT SERPL W/O P-5'-P-CCNC: 12 U/L (ref 10–44)
ANION GAP SERPL CALC-SCNC: 14 MMOL/L (ref 8–16)
AST SERPL-CCNC: 21 U/L (ref 10–40)
BASOPHILS # BLD AUTO: 0.03 K/UL (ref 0–0.2)
BASOPHILS NFR BLD: 0.4 % (ref 0–1.9)
BILIRUB SERPL-MCNC: 0.8 MG/DL (ref 0.1–1)
BUN SERPL-MCNC: 14 MG/DL (ref 6–20)
CALCIUM SERPL-MCNC: 9.7 MG/DL (ref 8.7–10.5)
CHLORIDE SERPL-SCNC: 102 MMOL/L (ref 95–110)
CO2 SERPL-SCNC: 20 MMOL/L (ref 23–29)
CREAT SERPL-MCNC: 0.8 MG/DL (ref 0.5–1.4)
DIFFERENTIAL METHOD: NORMAL
EOSINOPHIL # BLD AUTO: 0.1 K/UL (ref 0–0.5)
EOSINOPHIL NFR BLD: 1.4 % (ref 0–8)
ERYTHROCYTE [DISTWIDTH] IN BLOOD BY AUTOMATED COUNT: 12.7 % (ref 11.5–14.5)
EST. GFR  (AFRICAN AMERICAN): >60 ML/MIN/1.73 M^2
EST. GFR  (NON AFRICAN AMERICAN): >60 ML/MIN/1.73 M^2
GLUCOSE SERPL-MCNC: 107 MG/DL (ref 70–110)
HCT VFR BLD AUTO: 38.9 % (ref 37–48.5)
HGB BLD-MCNC: 13.1 G/DL (ref 12–16)
IMM GRANULOCYTES # BLD AUTO: 0.02 K/UL (ref 0–0.04)
IMM GRANULOCYTES NFR BLD AUTO: 0.3 % (ref 0–0.5)
LYMPHOCYTES # BLD AUTO: 2.1 K/UL (ref 1–4.8)
LYMPHOCYTES NFR BLD: 27 % (ref 18–48)
MAGNESIUM SERPL-MCNC: 2 MG/DL (ref 1.6–2.6)
MCH RBC QN AUTO: 29.8 PG (ref 27–31)
MCHC RBC AUTO-ENTMCNC: 33.7 G/DL (ref 32–36)
MCV RBC AUTO: 88 FL (ref 82–98)
MONOCYTES # BLD AUTO: 0.6 K/UL (ref 0.3–1)
MONOCYTES NFR BLD: 8.2 % (ref 4–15)
NEUTROPHILS # BLD AUTO: 4.8 K/UL (ref 1.8–7.7)
NEUTROPHILS NFR BLD: 62.7 % (ref 38–73)
NRBC BLD-RTO: 0 /100 WBC
PLATELET # BLD AUTO: 241 K/UL (ref 150–450)
PMV BLD AUTO: 10.4 FL (ref 9.2–12.9)
POTASSIUM SERPL-SCNC: 4.1 MMOL/L (ref 3.5–5.1)
PROT SERPL-MCNC: 7.2 G/DL (ref 6–8.4)
RBC # BLD AUTO: 4.4 M/UL (ref 4–5.4)
SODIUM SERPL-SCNC: 136 MMOL/L (ref 136–145)
WBC # BLD AUTO: 7.67 K/UL (ref 3.9–12.7)

## 2022-02-06 PROCEDURE — 36415 COLL VENOUS BLD VENIPUNCTURE: CPT | Performed by: PHYSICIAN ASSISTANT

## 2022-02-06 PROCEDURE — 80053 COMPREHEN METABOLIC PANEL: CPT | Performed by: PHYSICIAN ASSISTANT

## 2022-02-06 PROCEDURE — 84425 ASSAY OF VITAMIN B-1: CPT | Performed by: PHYSICIAN ASSISTANT

## 2022-02-06 PROCEDURE — 84591 ASSAY OF NOS VITAMIN: CPT | Performed by: PHYSICIAN ASSISTANT

## 2022-02-06 PROCEDURE — 97165 OT EVAL LOW COMPLEX 30 MIN: CPT

## 2022-02-06 PROCEDURE — 97530 THERAPEUTIC ACTIVITIES: CPT

## 2022-02-06 PROCEDURE — 85025 COMPLETE CBC W/AUTO DIFF WBC: CPT | Performed by: PHYSICIAN ASSISTANT

## 2022-02-06 PROCEDURE — G0378 HOSPITAL OBSERVATION PER HR: HCPCS

## 2022-02-06 PROCEDURE — 84207 ASSAY OF VITAMIN B-6: CPT | Performed by: PHYSICIAN ASSISTANT

## 2022-02-06 PROCEDURE — 86235 NUCLEAR ANTIGEN ANTIBODY: CPT | Performed by: PHYSICIAN ASSISTANT

## 2022-02-06 PROCEDURE — 95816 EEG AWAKE AND DROWSY: CPT | Mod: 26,,, | Performed by: PSYCHIATRY & NEUROLOGY

## 2022-02-06 PROCEDURE — 95816 EEG AWAKE AND DROWSY: CPT

## 2022-02-06 PROCEDURE — 97161 PT EVAL LOW COMPLEX 20 MIN: CPT

## 2022-02-06 PROCEDURE — 83735 ASSAY OF MAGNESIUM: CPT | Performed by: PHYSICIAN ASSISTANT

## 2022-02-06 PROCEDURE — 86235 NUCLEAR ANTIGEN ANTIBODY: CPT | Mod: 59 | Performed by: PHYSICIAN ASSISTANT

## 2022-02-06 PROCEDURE — 99204 PR OFFICE/OUTPT VISIT, NEW, LEVL IV, 45-59 MIN: ICD-10-PCS | Mod: AF,HB,95, | Performed by: PSYCHIATRY & NEUROLOGY

## 2022-02-06 PROCEDURE — 25000003 PHARM REV CODE 250: Performed by: PHYSICIAN ASSISTANT

## 2022-02-06 PROCEDURE — 95816 PR EEG,W/AWAKE & DROWSY RECORD: ICD-10-PCS | Mod: 26,,, | Performed by: PSYCHIATRY & NEUROLOGY

## 2022-02-06 PROCEDURE — 99204 OFFICE O/P NEW MOD 45 MIN: CPT | Mod: AF,HB,95, | Performed by: PSYCHIATRY & NEUROLOGY

## 2022-02-06 RX ORDER — GABAPENTIN 300 MG/1
600 CAPSULE ORAL ONCE
Status: COMPLETED | OUTPATIENT
Start: 2022-02-06 | End: 2022-02-06

## 2022-02-06 RX ORDER — SERTRALINE HYDROCHLORIDE 100 MG/1
100 TABLET, FILM COATED ORAL DAILY
Qty: 30 TABLET | Refills: 0 | Status: ON HOLD | OUTPATIENT
Start: 2022-02-06 | End: 2022-02-09 | Stop reason: SDUPTHER

## 2022-02-06 RX ORDER — TIZANIDINE 4 MG/1
4 TABLET ORAL EVERY 8 HOURS PRN
Qty: 21 TABLET | Refills: 0 | Status: SHIPPED | OUTPATIENT
Start: 2022-02-06 | End: 2022-02-16

## 2022-02-06 RX ORDER — GABAPENTIN 300 MG/1
900 CAPSULE ORAL 3 TIMES DAILY
Qty: 270 CAPSULE | Refills: 0 | Status: SHIPPED | OUTPATIENT
Start: 2022-02-06 | End: 2023-12-12

## 2022-02-06 RX ORDER — GABAPENTIN 300 MG/1
900 CAPSULE ORAL 3 TIMES DAILY
Status: DISCONTINUED | OUTPATIENT
Start: 2022-02-06 | End: 2022-02-06 | Stop reason: HOSPADM

## 2022-02-06 RX ADMIN — SERTRALINE HYDROCHLORIDE 100 MG: 100 TABLET ORAL at 09:02

## 2022-02-06 RX ADMIN — FERROUS SULFATE TAB 325 MG (65 MG ELEMENTAL FE) 1 EACH: 325 (65 FE) TAB at 09:02

## 2022-02-06 RX ADMIN — ACETAMINOPHEN 650 MG: 325 TABLET ORAL at 12:02

## 2022-02-06 RX ADMIN — ACETAMINOPHEN 650 MG: 325 TABLET ORAL at 05:02

## 2022-02-06 RX ADMIN — GABAPENTIN 900 MG: 300 CAPSULE ORAL at 03:02

## 2022-02-06 RX ADMIN — GABAPENTIN 600 MG: 300 CAPSULE ORAL at 11:02

## 2022-02-06 RX ADMIN — GABAPENTIN 300 MG: 300 CAPSULE ORAL at 09:02

## 2022-02-06 NOTE — PT/OT/SLP EVAL
Physical Therapy Evaluation and Discharge Note    Patient Name:  Lore Johnson   MRN:  [id number]    Recommendations:     Discharge Recommendations:  home   Discharge Equipment Recommendations: none   Barriers to discharge: None    Assessment:     Lore Johnson is a 36 y.o. female admitted with a medical diagnosis of Acute encephalopathy. .  At this time, patient is functioning at their prior level of function and does not require further acute PT services.     Recent Surgery: * No surgery found *      Plan:     During this hospitalization, patient does not require further acute PT services.  Please re-consult if situation changes.      Subjective     Chief Complaint: Pain in B feet, pt states she is unable to wear shoes or anything, including covers, on her feet 2* to increased sensitivity and pain.   Patient/Family Comments/goals: To go home.   Pain/Comfort:  · Pain Rating 1: 8/10  · Location 1:  (feet, neck, legs and hands)  · Pain Addressed 1: Nurse notified  · Pain Rating Post-Intervention 1: 8/10    Patients cultural, spiritual, Judaism conflicts given the current situation: no    Living Environment:  Pt lives with her parents and 7 year old daughter in a [facility] with NSTE.   Prior to admission, patients level of function was Mod I, supervision for transferring to bath 2* to fear of falling. No history of falls.  Equipment used at home:  (Moterized scooter, manual W/C, RW, BSC, slide board,).  DME owned (not currently used): none.  Upon discharge, patient will have assistance from family.    Objective:     Communicated with YULISA Landry prior to session.  Patient found HOB elevated with telemetry,bed alarm upon PT entry to room.    General Precautions: Standard, fall   Orthopedic Precautions:N/A   Braces: N/A   Respiratory Status: Room air    Exams:  · Cognitive Exam:  Patient is oriented to Person, Place and Time  · Gross Motor Coordination:  WFL  · RLE ROM: WFL except lack of DF.   · RLE Strength: Grossly 4/5  bilaterally.   · LLE ROM: WFL except lack of DF.   · LLE Strength: Grossly 4/5    Functional Mobility:  · Bed Mobility:     · Supine to Sit: independence  · Sit to Supine: independence  · Transfers:     · Sit to Stand:  independence with no AD  · Gait: ~30 feet without AD and SBA. Pt demonstrates decreased gait speed, step length, decreased foot clearance bilaterally. This is pt's baseline gait.   · Balance: Good    AM-PAC 6 CLICK MOBILITY  Total Score:23       Therapeutic Activities and Exercises:  ·  Bed mobility and transfers as listed above.   · Pt was educated on role of PT and POC.     Patient left supine with HOE elevated with all lines intact, call button in reach, bed alarm on and RN notified.     AM-PAC 6 CLICK MOBILITY  Total Score:23     GOALS:   Multidisciplinary Problems     Physical Therapy Goals     Not on file          Multidisciplinary Problems (Resolved)        Problem: Physical Therapy Goal    Goal Priority Disciplines Outcome Goal Variances Interventions   Physical Therapy Goal   (Resolved)     PT, PT/OT Met                     History:     Past Medical History:   Diagnosis Date    Allergy     Anxiety     Hydrocephalus     Hypertension      (ventriculoperitoneal) shunt status     not active currently       Past Surgical History:   Procedure Laterality Date     SECTION      VENTRICULOPERITONEAL SHUNT         Time Tracking:     PT Received On: 22  PT Start Time: 1047     PT Stop Time: 1102  PT Total Time (min): 15 min with OT.     Billable Minutes: Evaluation 15      2022

## 2022-02-06 NOTE — PROGRESS NOTES
VN cued into room to review discharge instructions.  Went over doctor specific instructions, new medications, where to , follow up appointments, when to call the doctor.  All questions answered.  Informed pt of survey.  Pt waiting on ride to arrive.       02/06/22 1717   Shift   Virtual Nurse - Rounding Complete   Virtual Nurse - Patient Verbalized Approval Of Camera Use;VN Rounding   Type of Frequent Check   Type Other (see comments)  (discharge)   Safety/Activity   Patient Rounds bed wheels locked;placement of personal items at bedside;call light in patient/parent reach;visualized patient;clutter free environment maintained   Positioning   Head of Bed (HOB) Positioning HOB elevated

## 2022-02-06 NOTE — ASSESSMENT & PLAN NOTE
Differential includes Seizure/post-ictal and Traumatic(LOS time documented unknown min), psychosis, delirium.  Workup Blood Chemistries, Consult to Neurology, Neuro-Imaging (MRI/CT), Toxic metabolite eval, Urine Drug screen and Consult to psych   -CT with No significant change as compared to the previous examination.  Enlargement of the ventricular system with associated large cystic fluid collection in the posterior fossa.  No change or migration of the tubing placed within the cystic collection in the posterior fossa.  -workup negative for infection thus far.  COVID negative, CXR unremarkable, UA with no evidence of infection.  Recent admission 12/26-12/30 pt with UTI with improvement in symptoms after treatment with antibiotic however pt also improved with valium.  Past EEG's unremarkable.  -Appreciate Neuro and Psych recs.  Will order EEG.  Will not proceed with valium taper at this time unless pt demonstrates catatonia.  -Rituxan infusion outpatient, we do not have rheumatology at this campus.

## 2022-02-06 NOTE — PROGRESS NOTES
Progress Note  Neurology    Admit Date: 2/5/2022  LOS: 0    CC: Acute encephalopathy    SUBJECTIVE:     Interval History/Significant Events: NAEON. Patient complains of bilateral LE neuropathic pain otherwise denies any acute issues. She is alert and oriented completely, communicating effectively and is appropriate on exam. There is some child-like quality to speech. EEG performed at bedside today per patient.     Review of Symptoms: Pertinent items noted in HPI as above.     Medications:    Scheduled Meds:   ferrous sulfate  1 tablet Oral Daily with breakfast    gabapentin  900 mg Oral TID    sertraline  100 mg Oral Daily     PRN Meds:.acetaminophen, albuterol-ipratropium, bisacodyL, dextrose 50%, dextrose 50%, glucagon (human recombinant), glucose, glucose, meclizine, melatonin, naloxone, ondansetron, polyethylene glycol    OBJECTIVE:     Physical Exam:    Vital Signs (24h Range):   Temp:  [98.1 °F (36.7 °C)-99.2 °F (37.3 °C)] 98.8 °F (37.1 °C)  Pulse:  [] 99  Resp:  [17-20] 18  SpO2:  [99 %-100 %] 99 %  BP: (135-165)/() 147/110    Neuro:  Orientation  Alert, oriented to person, place, year, month, date, and situation  Memory  Recent and remote memory intact.  Language  Intact comprehension, verbal fluency, naming, and repetition  Cranial Nerves  PERRL, VF intact, EOMI, V1-V3 intact, symmetric facial expression, hearing grossly intact, SCM & TPZ 5/5, tongue midline, symmetric palate elevation.  Motor  Normal Bulk  Normal Tone  5/5 strength in 4 extremities  Sensory  Normal to light touch throughout  DTR  +2 symmetric  Cerebellar/Gait  Normal finger to nose    Labs:  BMP:  Recent Labs   Lab 02/06/22  0420      K 4.1      CO2 20*   BUN 14   CREATININE 0.8      MG 2.0     LFT:   Lab Results   Component Value Date    AST 21 02/06/2022    ALT 12 02/06/2022    ALKPHOS 76 02/06/2022    BILITOT 0.8 02/06/2022    ALBUMIN 4.4 02/06/2022    PROT 7.2 02/06/2022     CBC:   Lab Results    Component Value Date    WBC 7.67 02/06/2022    HGB 13.1 02/06/2022    HCT 38.9 02/06/2022    MCV 88 02/06/2022     02/06/2022     Imaging:  I have personally reviewed the images below:     HEAD CT:  No significant change as compared to the previous examination.  Enlargement of the ventricular system with associated large cystic fluid collection in the posterior fossa.  No change or migration of the tubing placed within the cystic collection in the posterior fossa.    ASSESSMENT/PLAN:     Lore Johnson is a 36 y.o. F w/ Hx of anxiety, HTN, Hydrocephalus s/p  shunt, and recent diagnosis of Sjogren's syndrome in 2021 on rituximab infusions at [facility] with chronic neuropathic pain who is currently hospitalized with c/f acute encephalopathy.     Clinically improved and returned to baseline today. Patient able to recall events in detail leading up to hospitalization, there is no description of any seizure-like activity reported. Prior EEG has been unremarkable, repeat EEG performed today at bedside, again with normal read. Do not anticipate AED needs at this time. There is concern that events leading to hospitalization may have a psychogenic component, further assessment and treatment with psychiatry as outpatient will be beneficial, appreciate psychiatry assistance with case. No further neurologic work up is recommended at this time.     Additionally re-establishing with Rheumatology to restart Rituxan infusions will be helpful and was emphasized to Ms. Johnson at bedside today who voiced understanding. Follow up with primary Neurologist at [facility] as previously scheduled.     Discussed with staff, Dr. Cortes.    Neurology will sign off. Please reach out to resident on call if further questions remain regarding the neurologic care of this patient.     Javad Hoyt MD   Neurology, PGY-IV

## 2022-02-06 NOTE — PLAN OF CARE
Discharge orders noted, no HH or HME ordered.    Pt's nurse will go over medications/signs and symptoms prior to discharge       02/06/22 1645   Final Note   Assessment Type Final Discharge Note   Anticipated Discharge Disposition Home   What phone number can be called within the next 1-3 days to see how you are doing after discharge? [phone]   Hospital Resources/Appts/Education Provided   (Offices closed for weekend. Patient to schedule own follow up appointment.)   Post-Acute Status   Discharge Delays None known at this time     Mary Abreu RN Transitional Navigator  [phone]

## 2022-02-06 NOTE — NURSING
Pt given AVS. No questions or concerns at this time. IV removed. Telemetry monitor removed. Waiting for pt's ride to arrive. Pt to be discharged to home.

## 2022-02-06 NOTE — PLAN OF CARE
Pt at baseline no PT needs at this time.   Problem: Physical Therapy Goal  Goal: Physical Therapy Goal  Outcome: Met

## 2022-02-06 NOTE — PLAN OF CARE
AAOx4. Pleasant throughout shift. Complaints of leg nerve pain. Tolerating regular diet. Voiding per bedpan. Cardiac monitoring maintained. Bed locked in lowest position, bed alarm set and call bell within reach.

## 2022-02-06 NOTE — SUBJECTIVE & OBJECTIVE
Interval History:  no acute events overnight, no new complaints.  Pt remembers provider from yesterday and admits to not wanting to participate in the interview.  She recalls majority of events from yesterday.  Today she is answering questions appropriately.  She is asking about other options for neuropathy, adjusted gabapentin to home dose.    Review of Systems   Constitutional: Negative for fatigue, fever and unexpected weight change.   HENT: Negative for trouble swallowing and voice change.    Eyes: Negative for photophobia and visual disturbance.   Respiratory: Negative for cough and shortness of breath.    Cardiovascular: Negative for chest pain, palpitations and leg swelling.   Gastrointestinal: Negative for abdominal pain, constipation, diarrhea, nausea and vomiting.   Genitourinary: Negative for dysuria and frequency.   Musculoskeletal: Negative for arthralgias and myalgias.   Skin: Negative for rash and wound.   Neurological: Positive for numbness. Negative for dizziness, tremors, seizures, syncope, facial asymmetry, speech difficulty, weakness, light-headedness and headaches.   Psychiatric/Behavioral: Negative for dysphoric mood. The patient is not nervous/anxious.      Objective:     Vital Signs (Most Recent):  Temp: 98.8 °F (37.1 °C) (02/06/22 0708)  Pulse: 99 (02/06/22 0800)  Resp: 18 (02/06/22 0708)  BP: (!) 147/110 (02/06/22 0708)  SpO2: 99 % (02/06/22 0708) Vital Signs (24h Range):  Temp:  [98.1 °F (36.7 °C)-99.2 °F (37.3 °C)] 98.8 °F (37.1 °C)  Pulse:  [] 99  Resp:  [17-20] 18  SpO2:  [99 %-100 %] 99 %  BP: (135-165)/() 147/110     Weight: 61.9 kg (136 lb 7.4 oz)  Body mass index is 25.78 kg/m².    Intake/Output Summary (Last 24 hours) at 2/6/2022 1044  Last data filed at 2/5/2022 1734  Gross per 24 hour   Intake --   Output 400 ml   Net -400 ml      Physical Exam  Vitals and nursing note reviewed.   Constitutional:       Appearance: She is well-developed and well-nourished.   HENT:       Head: Normocephalic and atraumatic.   Eyes:      Extraocular Movements: EOM normal.      Pupils: Pupils are equal, round, and reactive to light.   Neck:      Thyroid: No thyromegaly.      Trachea: No tracheal deviation.   Cardiovascular:      Rate and Rhythm: Normal rate and regular rhythm.      Heart sounds: No murmur heard.      Pulmonary:      Effort: Pulmonary effort is normal.      Breath sounds: Normal breath sounds. No wheezing.   Abdominal:      General: Bowel sounds are normal.      Palpations: Abdomen is soft.      Tenderness: There is no abdominal tenderness.   Musculoskeletal:         General: No tenderness or edema. Normal range of motion.      Cervical back: Normal range of motion and neck supple.   Lymphadenopathy:      Cervical: No cervical adenopathy.   Skin:     General: Skin is warm and dry.   Neurological:      Mental Status: She is alert and oriented to person, place, and time.      Cranial Nerves: No cranial nerve deficit.      Deep Tendon Reflexes: Reflexes are normal and symmetric.   Psychiatric:         Attention and Perception: Attention normal.         Mood and Affect: Mood and affect normal. Affect is flat.         Speech: Speech normal.         Behavior: Behavior normal.         Thought Content: Thought content normal. Thought content does not include suicidal ideation. Thought content does not include homicidal or suicidal plan.         Significant Labs: All pertinent labs within the past 24 hours have been reviewed.    Significant Imaging: I have reviewed all pertinent imaging results/findings within the past 24 hours.

## 2022-02-06 NOTE — CONSULTS
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   02/06/2022 4.1     Calcium (mg/dL)   Date Value   02/06/2022 9.7     Phosphorus (mg/dL)   Date Value   12/28/2021 3.5     Magnesium (mg/dL)   Date Value   02/06/2022 2.0     CO2 (mmol/L)   Date Value   02/06/2022 20 (L)         Most Recent Blood Glucose & HgA1c on File:    Glucose (mg/dL)   Date Value   02/06/2022 107     Hemoglobin A1C (%)   Date Value   12/20/2021 4.7         Most Recent Renal Function Tests on File:  (i.e. Cr, BUN, GFR, Urine Specific Gravity, Urine Protein)    Creatinine (mg/dL)   Date Value   02/06/2022 0.8     BUN (mg/dL)   Date Value   02/06/2022 14     eGFR if African American (mL/min/1.73 m^2)   Date Value   02/06/2022 >60     eGFR if non African American (mL/min/1.73 m^2)   Date Value   02/06/2022 >60     Specific [place], UA (no units)   Date Value   02/05/2022 1.020     Protein, UA (no units)   Date Value   02/05/2022 1+ (A)         Most Recent Liver Function Tests on File:  (i.e. AST, ALT, Total Bili, Ammonia, Albumin, INR)    AST (U/L)   Date Value   02/06/2022 21     ALT (U/L)   Date Value   02/06/2022 12     Total Bilirubin (mg/dL)   Date Value   02/06/2022 0.8     Ammonia (umol/L)   Date Value   12/27/2021 27     Albumin (g/dL)   Date Value   02/06/2022 4.4          Most Recent Pancreatic Function Tests on File:  (i.e. Amylase, Lipase)    Lipase (U/L)   Date Value   02/17/2021 20         Most Recent Blood Counts on File:  (i.e. WBC, ANC, RBC, MCV, Platelets)    WBC (K/uL)   Date Value   02/06/2022 7.67     Gran # (ANC) (K/uL)   Date Value   02/06/2022 4.8     Gran % (%)   Date Value   02/06/2022 62.7     RBC (M/uL)   Date Value   02/06/2022 4.40     MCV (fL)   Date Value   02/06/2022 88     Platelets (K/uL)   Date Value   02/06/2022 241         Most Recent Thyroid/Parathyroid Function Tests on File:  (i.e. TSH, Free T4, Total T4, Free T3, Total T3, PTH)    TSH (uIU/mL)   Date Value   02/05/2022 2.080         Most Recent Lipid Panel Results on File:  (i.e. Cholesterol, Triglycerides, LDH, HDL)    Cholesterol (mg/dL)   Date Value   05/15/2021 264 (H)     Triglycerides (mg/dL)   Date Value   05/15/2021 120     LDL Calculated (mg/dL)   Date Value   05/15/2021 211 (H)     HDL (mg/dL)   Date Value   05/15/2021 29 (L)         Most Recent CPK & Prolactin on File:    No results found for: CPK, PROLACTIN      Most Recent Vitamin Levels on File:  (i.e. Vitamin B12, Folate, Vitamin D)    Vitamin B-12 (pg/mL)   Date Value   12/27/2021 637     Folate (ng/mL)   Date Value   12/27/2021 11.2          Most Recent Infection Disease Panel on File:  (i.e. Syphilis, Hepatitis C, Hepatitis B, HIV)     RPR (no units)   Date Value   12/27/2021 Non-reactive     Hepatitis B Surface Ag (no units)   Date Value   01/30/2019 Negative     Hepatitis C Ab (no units)   Date Value   01/30/2019 Negative     HIV 1/2 Ag/Ab (no units)   Date Value   07/24/2014 Negative         Pregnancy Screenings:    Preg Test, Ur (no units)   Date Value   02/05/2022 Negative     POC Preg Test, Ur (no units)   Date Value   01/30/2019 Negative         Lithium & Valproate Levels on File:    No results found for: LITHIUM    No results found for: VALPROATE      Most Recent Carbamazepine & Lamotrigine Levels on File:    No  results found for: CBMZ, LAMOTRIGINE      Most Recent Clozapine Level on File:    No results found for: CLOZAPINE, NORCLOZAP, CLOZNORCLOZ      Results on File for Alcohol Screens (Blood, Urine):    Alcohol, Serum (mg/dL)   Date Value   12/26/2021 <10   01/30/2019 <10       No results found for: PCDSOALCOHOL      Results on File for Urine Drug Screens (Benzodiazepines, Barbiturates, Methadone, Opiates, Cocaine, Amphetamines, THC, PCP):    Benzodiazepines (no units)   Date Value   02/05/2022 Presumptive Positive (A)   12/26/2021 Presumptive Positive (A)   12/18/2014 Negative     Barbiturate Screen, Ur (no units)   Date Value   02/05/2022 Negative   12/26/2021 Negative   12/18/2014 Negative     Methadone metabolites (no units)   Date Value   02/05/2022 Negative   12/26/2021 Negative   12/18/2014 Negative     Opiate Scrn, Ur (no units)   Date Value   02/05/2022 Negative   12/26/2021 Negative   12/18/2014 Presumptive Positive     Cocaine (Metab.) (no units)   Date Value   02/05/2022 Negative   12/26/2021 Negative   12/18/2014 Negative     Amphetamine Screen, Ur (no units)   Date Value   02/05/2022 Negative   12/26/2021 Negative   12/18/2014 Negative     THC (no units)   Date Value   02/05/2022 Negative   12/26/2021 Negative   12/18/2014 Presumptive Positive     Phencyclidine (no units)   Date Value   02/05/2022 Negative   12/26/2021 Negative   12/18/2014 Negative         Most Recent Results for Buprenorphine Testing:    No results found for: BUPRENORPH, NORBUPRENOR      Results on File for Phosphatidylethanol (PETH):    No results found for: PETH      Results on File for Ethyl Glucuronide (EtG) and Ethyl Sulfate (EtS):    No results found for: THEYLGLUCU, ETHYLSULF      Most Recent Results on File for Alcohol Biomarkers (GGT, CDT):    No results found for: GGT, CDT        RELEVANT NEUROLOGIC IMAGING:  (i.e. CT/MRI brain)      CT HEAD WITHOUT CONTRAST    Results for orders placed during the hospital encounter of  02/05/22    CT Head Without Contrast    Narrative  EXAMINATION:  CT HEAD WITHOUT CONTRAST    CLINICAL HISTORY:  Head trauma, abnormal mental status (Age 19-64y);head trauma 1 week ago ams today;    TECHNIQUE:  Low dose axial images were obtained through the head.  Coronal and sagittal reformations were also performed. Contrast was not administered.    COMPARISON:  December 26, 2021    FINDINGS:  The bone window portion of the examination reveals no apparent evidence of an acute injury.  Skull base is unremarkable.  The paranasal air passages are clear.  The mastoid air cells are also unremarkable.  There is shunt tubing placed into the posterior fossa via a posterior right approach which is draining a large cystic structure which was seen previously.  The size and contour of the cyst is consistent with the previous examination obtained in late December.    The lateral 3rd and 4th ventricles also are enlarged and stable in appearance as compared to the previous examination    The lateral projection indicates that there is thinning of the corpus callosum.  The mid brain brainstem and proximal spinal cord are unremarkable.  No indication of a suprasellar mass.  The pituitary and optic chiasm are unremarkable.  The contents of the orbits appear unremarkable.    Impression  No significant change as compared to the previous examination.    Enlargement of the ventricular system with associated large cystic fluid collection in the posterior fossa.    No change or migration of the tubing placed within the cystic collection in the posterior fossa.      Electronically signed by: Austin Isidro  Date:    02/05/2022  Time:    10:59      MRI HEAD WITHOUT CONTRAST    No results found for this or any previous visit.      MRI HEAD WITH AND WITHOUT CONTRAST    No results found for this or any previous visit.

## 2022-02-06 NOTE — CARE UPDATE
Discussed case with rheumatology Dr. Hughes.  Rituximab recommended to continue as outpatient therapy.  If needed inpatient, this would require transfer to [facility].

## 2022-02-06 NOTE — PLAN OF CARE
Problem: Occupational Therapy Goal  Goal: Occupational Therapy Goal  Outcome: Adequate for Care Transition       Pt reports she is performing at baseline for ADLs and functional mobility. Reports no further therapy concerns. Is requesting CM look into file regarding braces for BLEs that were supposed to be ordered during a previous hospital admit. Owns recommended DME. No further OT needs .

## 2022-02-06 NOTE — ASSESSMENT & PLAN NOTE
Differential includes Seizure/post-ictal and Traumatic(LOS time documented unknown min), psychosis, delirium.  Workup Blood Chemistries, Consult to Neurology, Neuro-Imaging (MRI/CT), Toxic metabolite eval, Urine Drug screen and Consult to psych   -CT with No significant change as compared to the previous examination.  Enlargement of the ventricular system with associated large cystic fluid collection in the posterior fossa.  No change or migration of the tubing placed within the cystic collection in the posterior fossa.  -workup negative for infection thus far.  COVID negative, CXR unremarkable, UA with no evidence of infection.  Recent admission 12/26-12/30 pt with UTI with improvement in symptoms after treatment with antibiotic however pt also improved with valium.  Past EEG's unremarkable.  -Appreciate Neuro and Psych recs.  Will order EEG, no seizure activity.  Will not proceed with valium taper at this time unless pt demonstrates catatonia.  -Rituxan infusion outpatient, we do not have rheumatology at this campus.

## 2022-02-06 NOTE — DISCHARGE SUMMARY
[facility]  Discharge Summary      Patient Name: Lore Johnson  MRN: [id number]  Patient Class: OP- Observation  Admission Date: 2/5/2022  Hospital Length of Stay: 0 days  Discharge Date and Time:  02/06/2022 4:38 PM  Attending Physician: Leah Bonner MD   Discharging Provider: Clarice Lundy PA-C  Primary Care Provider: Mima Lacey MD      HPI:   36 y.o. female with significant past medical history of anxiety, HTN, Hydrocephalus s/p  (ventriculoperitoneal) shunt status, and recent diagnosis of Sjogren's syndrome 2021 on infusions at [facility] presented to the ER with complaints of AMS.  History provided by pt's mother.  Pt lives with her mother who reports increased agitation over the past week and worsening.  3 days prior to admission pt stopped talking and remained in bed constantly.  This morning a  was called by the patient, mother not aware, and the patient could not be found in the home.  Pt was found at the neighbor's house and reportedly walked there barefoot with no coat or sweater.  Pt's mother reports she missed last infusion for sjogren's due to hospitalization at [facility] 12/26-12/30.  Pt's mother denies head trauma or LOC.  She did hit her head a week ago.  At baseline, pt can perform all ADLs.  She has a motorized wheelchair but can walk.  Pt's mother denies seizure like activity or post-ictal period.  Pt's mother denies diaphoresis, fever, nasal congestion, chest pain, loss of vision, slurred speech, focal weakness, vomiting, and incontinence.  No recent sick contacts.  Pt is not vaccinated against COVID.  Pt's mother denies alcohol or drug use.  Pt's mother confirms previous admissions for encephalopathy.  She was admitted in august of 2021 to [facility] with similar symptoms and treated with abx for a UTI as well as a valium taper for catatonia with subsequent improvement in her mental status. Admission at [facility] 12/26-12/30 encephalopathy  attributed to UTIs, became catatonic and discharged on valium taper.      In the ED, VSS tachycardic (114), tachypneic (35) and hypertensive (162/93).  CMP unremarkable.  CBC unremarkable.  UA +Ketones and protein only.  Drug screen positive for benzodiazepines.  Imaging CT head unchanged from previous, notes with large cystic fluid. No change in stent placement. CXR no acute cardiopulmonary process.       * No surgery found *      Hospital Course:   Pt placed in observation for evaluation of acute encephalopathy vs delirium with recent catatonia and concern for psychosomatic component.  No infectious process identified, previous admissions pt presented with UTIs which was not evident this admission.  Neurology and Psychiatry consulted.  Neurology recommended repeat EEG which showed no seizure activity.  Recommend re-establishing with Rheumatology to restart Rituxan infusions.  Psychiatry did not recommend valium taper at this time as this could exacerbate delirium, indicated if catatonia re-emerges.  Follow up PCP, Neurology, rheumatology.       Psych follow up 1-2 weeks from discharge with close monitoring.       Goals of Care Treatment Preferences:  Code Status: Full Code      Consults:   Consults (From admission, onward)        Status Ordering Provider     Inpatient consult to Rheumatology  Once        Provider:  Josef Miller MD    Acknowledged KELVIN HUNT     Inpatient consult to Neurology  Once        Provider:  Javad Hoyt MD    Completed KELVIN HUNT     Inpatient consult to Telemedicine - Psychiatry  Once        Provider:  (Not yet assigned)    Acknowledged ROSALINDA BELLA          * Acute encephalopathy  Differential includes Seizure/post-ictal and Traumatic(LOS time documented unknown min), psychosis, delirium.  Workup Blood Chemistries, Consult to Neurology, Neuro-Imaging (MRI/CT), Toxic metabolite eval, Urine Drug screen and Consult to psych   -CT with No significant change as compared to  the previous examination.  Enlargement of the ventricular system with associated large cystic fluid collection in the posterior fossa.  No change or migration of the tubing placed within the cystic collection in the posterior fossa.  -workup negative for infection thus far.  COVID negative, CXR unremarkable, UA with no evidence of infection.  Recent admission 12/26-12/30 pt with UTI with improvement in symptoms after treatment with antibiotic however pt also improved with valium.  Past EEG's unremarkable.  -Appreciate Neuro and Psych recs.  Will order EEG, no seizure activity.  Will not proceed with valium taper at this time unless pt demonstrates catatonia.  -Rituxan infusion outpatient, we do not have rheumatology at this campus.      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  Resume outpatient.        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    Specialties: Psychiatry, Psychology  Contact  information:  [address]  [facility] [id number]  [phone]             Mima Lacey MD In 3 days.    Specialty: Family Medicine  Contact information:  [address]  [facility] [address] [phone]                       Patient Instructions:      Notify your health care provider if you experience any of the following:  temperature >100.4     Notify your health care provider if you experience any of the following:  persistent nausea and vomiting or diarrhea     Notify your health care provider if you experience any of the following:  difficulty breathing or increased cough     Notify your health care provider if you experience any of the following:  severe persistent headache     Notify your health care provider if you experience any of the following:  persistent dizziness, light-headedness, or visual disturbances     Notify your health care provider if you experience any of the following:  increased confusion or weakness       Significant Diagnostic Studies: Labs: All labs within the past 24 hours have been reviewed    Pending Diagnostic Studies:     Procedure Component Value Units Date/Time    Niacin (vitamin B3) [[id number]] Collected: 02/06/22 1357    Order Status: Sent Lab Status: In process Updated: 02/06/22 1536    Specimen: Blood     Narrative:      Collection has been rescheduled by SC6 at 02/06/2022 11:12 Reason:   Unable to collect nurse notified  Collection has been rescheduled by SC6 at 02/06/2022 11:16 Reason:   Patient Refused to be stuck in hand only tried once   Collection has been rescheduled by SC6 at 02/06/2022 11:12 Reason:   Unable to collect nurse notified  Collection has been rescheduled by SC6 at 02/06/2022 11:16 Reason:   Patient Refused to be stuck in hand only tried once     Sjogrens syndrome-A extractable nuclear antibody [[id number]] Collected: 02/06/22 1357    Order Status: Sent Lab Status: In process Updated: 02/06/22 1540    Specimen: Blood      Narrative:      Collection has been rescheduled by SC6 at 02/06/2022 11:12 Reason:   Unable to collect nurse notified  Collection has been rescheduled by SC6 at 02/06/2022 11:16 Reason:   Patient Refused to be stuck in hand only tried once     Sjogrens syndrome-B extractable nuclear antibody [[id number]] Collected: 02/06/22 1357    Order Status: Sent Lab Status: In process Updated: 02/06/22 1540    Specimen: Blood     Narrative:      Collection has been rescheduled by SC6 at 02/06/2022 11:12 Reason:   Unable to collect nurse notified  Collection has been rescheduled by SC6 at 02/06/2022 11:16 Reason:   Patient Refused to be stuck in hand only tried once     Vitamin B1 [[id number]] Collected: 02/06/22 1357    Order Status: Sent Lab Status: In process Updated: 02/06/22 1536    Specimen: Blood     Narrative:      Collection has been rescheduled by SC6 at 02/06/2022 11:12 Reason:   Unable to collect nurse notified  Collection has been rescheduled by SC6 at 02/06/2022 11:16 Reason:   Patient Refused to be stuck in hand only tried once     Vitamin B6 [[id number]] Collected: 02/06/22 1357    Order Status: Sent Lab Status: In process Updated: 02/06/22 1536    Specimen: Blood     Narrative:      Collection has been rescheduled by SC6 at 02/06/2022 11:12 Reason:   Unable to collect nurse notified  Collection has been rescheduled by [facility] at 02/06/2022 11:16 Reason:   Patient Refused to be stuck in hand only tried once          Medications:  Reconciled Home Medications:      Medication List      START taking these medications    tiZANidine 4 MG tablet  Commonly known as: ZANAFLEX  Take 1 tablet (4 mg total) by mouth every 8 (eight) hours as needed (spasms).        CHANGE how you take these medications    gabapentin 300 MG capsule  Commonly known as: NEURONTIN  Take 3 capsules (900 mg total) by mouth 3 (three) times daily.  What changed: how much to take     sertraline 100 MG tablet  Commonly known as: ZOLOFT  Take 1 tablet (100 mg  total) by mouth once daily.  What changed: See the new instructions.        CONTINUE taking these medications    b complex vitamins tablet  Take 1 tablet by mouth once daily.     famotidine 20 MG tablet  Commonly known as: PEPCID  Take 1 tablet (20 mg total) by mouth 2 (two) times daily.     ferrous sulfate 325 mg (65 mg iron) Tab tablet  Commonly known as: FEOSOL  Take 325 mg by mouth daily with breakfast.     hydrOXYzine pamoate 25 MG Cap  Commonly known as: VISTARIL  Take 1 capsule (25 mg total) by mouth every 8 (eight) hours as needed.     meclizine 25 mg tablet  Commonly known as: ANTIVERT  Take 1 tablet (25 mg total) by mouth 3 (three) times daily as needed for Dizziness.     ondansetron 8 MG Tbdl  Commonly known as: ZOFRAN-ODT  Dissolve 1 tablet (8 mg total) by mouth every 8 (eight) hours as needed.     promethazine 25 MG tablet  Commonly known as: PHENERGAN  Take 1 tablet (25 mg total) by mouth every 6 (six) hours as needed for Nausea.        STOP taking these medications    valproate 250 mg/5 mL syrup  Commonly known as: DEPAKENE            Indwelling Lines/Drains at time of discharge:   Lines/Drains/Airways     None                 Time spent on the discharge of patient: >30 minutes         Clarice Lundy PA-C  Department of Hospital Medicine  [facility]

## 2022-02-06 NOTE — PROGRESS NOTES
[facility] Medicine  Progress Note    Patient Name: Lore Johnson  MRN: [id number]  Patient Class: OP- Observation   Admission Date: 2/5/2022  Length of Stay: 0 days  Attending Physician: Leah Bonner MD  Primary Care Provider: Mima Lacey MD        Subjective:     Principal Problem:Acute encephalopathy        HPI:  36 y.o. female with significant past medical history of anxiety, HTN, Hydrocephalus s/p  (ventriculoperitoneal) shunt status, and recent diagnosis of Sjogren's syndrome 2021 on infusions at [facility] presented to the ER with complaints of AMS.  History provided by pt's mother.  Pt lives with her mother who reports increased agitation over the past week and worsening.  3 days prior to admission pt stopped talking and remained in bed constantly.  This morning a  was called by the patient, mother not aware, and the patient could not be found in the home.  Pt was found at the neighbor's house and reportedly walked there barefoot with no coat or sweater.  Pt's mother reports she missed last infusion for sjogren's due to hospitalization at [place] 12/26-12/30.  Pt's mother denies head trauma or LOC.  She did hit her head a week ago.  At baseline, pt can perform all ADLs.  She has a motorized wheelchair but can walk.  Pt's mother denies seizure like activity or post-ictal period.  Pt's mother denies diaphoresis, fever, nasal congestion, chest pain, loss of vision, slurred speech, focal weakness, vomiting, and incontinence.  No recent sick contacts.  Pt is not vaccinated against COVID.  Pt's mother denies alcohol or drug use.  Pt's mother confirms previous admissions for encephalopathy.  She was admitted in august of 2021 to [facility] with similar symptoms and treated with abx for a UTI as well as a valium taper for catatonia with subsequent improvement in her mental status. Admission at [facility] 12/26-12/30 encephalopathy attributed to UTIs, became catatonic and  discharged on valium taper.      In the ED, VSS tachycardic (114), tachypneic (35) and hypertensive (162/93).  CMP unremarkable.  CBC unremarkable.  UA +Ketones and protein only.  Drug screen positive for benzodiazepines.  Imaging CT head unchanged from previous, notes with large cystic fluid. No change in stent placement. CXR no acute cardiopulmonary process.       Overview/Hospital Course:  Pt placed in observation for evaluation of acute encephalopathy vs delirium with recent catatonia and concern for psychosomatic component.  No infectious process identified, previous admissions pt presented with UTIs which was not evident this admission.  Neurology and Psychiatry consulted.  Neurology recommended repeat EEG. Psychiatry did not recommend valium taper at this time as this could exacerbate delirium, indicated if catatonia re-emerges.      Psych follow up 1-2 weeks from discharge with close monitoring.      Interval History:  no acute events overnight, no new complaints.  Pt remembers provider from yesterday and admits to not wanting to participate in the interview.  She recalls majority of events from yesterday.  Today she is answering questions appropriately.  She is asking about other options for neuropathy, adjusted gabapentin to home dose.    Review of Systems   Constitutional: Negative for fatigue, fever and unexpected weight change.   HENT: Negative for trouble swallowing and voice change.    Eyes: Negative for photophobia and visual disturbance.   Respiratory: Negative for cough and shortness of breath.    Cardiovascular: Negative for chest pain, palpitations and leg swelling.   Gastrointestinal: Negative for abdominal pain, constipation, diarrhea, nausea and vomiting.   Genitourinary: Negative for dysuria and frequency.   Musculoskeletal: Negative for arthralgias and myalgias.   Skin: Negative for rash and wound.   Neurological: Positive for numbness. Negative for dizziness, tremors, seizures, syncope,  facial asymmetry, speech difficulty, weakness, light-headedness and headaches.   Psychiatric/Behavioral: Negative for dysphoric mood. The patient is not nervous/anxious.      Objective:     Vital Signs (Most Recent):  Temp: 98.8 °F (37.1 °C) (02/06/22 0708)  Pulse: 99 (02/06/22 0800)  Resp: 18 (02/06/22 0708)  BP: (!) 147/110 (02/06/22 0708)  SpO2: 99 % (02/06/22 0708) Vital Signs (24h Range):  Temp:  [98.1 °F (36.7 °C)-99.2 °F (37.3 °C)] 98.8 °F (37.1 °C)  Pulse:  [] 99  Resp:  [17-20] 18  SpO2:  [99 %-100 %] 99 %  BP: (135-165)/() 147/110     Weight: 61.9 kg (136 lb 7.4 oz)  Body mass index is 25.78 kg/m².    Intake/Output Summary (Last 24 hours) at 2/6/2022 1044  Last data filed at 2/5/2022 1734  Gross per 24 hour   Intake --   Output 400 ml   Net -400 ml      Physical Exam  Vitals and nursing note reviewed.   Constitutional:       Appearance: She is well-developed and well-nourished.   HENT:      Head: Normocephalic and atraumatic.   Eyes:      Extraocular Movements: EOM normal.      Pupils: Pupils are equal, round, and reactive to light.   Neck:      Thyroid: No thyromegaly.      Trachea: No tracheal deviation.   Cardiovascular:      Rate and Rhythm: Normal rate and regular rhythm.      Heart sounds: No murmur heard.      Pulmonary:      Effort: Pulmonary effort is normal.      Breath sounds: Normal breath sounds. No wheezing.   Abdominal:      General: Bowel sounds are normal.      Palpations: Abdomen is soft.      Tenderness: There is no abdominal tenderness.   Musculoskeletal:         General: No tenderness or edema. Normal range of motion.      Cervical back: Normal range of motion and neck supple.   Lymphadenopathy:      Cervical: No cervical adenopathy.   Skin:     General: Skin is warm and dry.   Neurological:      Mental Status: She is alert and oriented to person, place, and time.      Cranial Nerves: No cranial nerve deficit.      Deep Tendon Reflexes: Reflexes are normal and symmetric.    Psychiatric:         Attention and Perception: Attention normal.         Mood and Affect: Mood and affect normal. Affect is flat.         Speech: Speech normal.         Behavior: Behavior normal.         Thought Content: Thought content normal. Thought content does not include suicidal ideation. Thought content does not include homicidal or suicidal plan.         Significant Labs: All pertinent labs within the past 24 hours have been reviewed.    Significant Imaging: I have reviewed all pertinent imaging results/findings within the past 24 hours.      Assessment/Plan:      * Acute encephalopathy  Differential includes Seizure/post-ictal and Traumatic(LOS time documented unknown min), psychosis, delirium.  Workup Blood Chemistries, Consult to Neurology, Neuro-Imaging (MRI/CT), Toxic metabolite eval, Urine Drug screen and Consult to psych   -CT with No significant change as compared to the previous examination.  Enlargement of the ventricular system with associated large cystic fluid collection in the posterior fossa.  No change or migration of the tubing placed within the cystic collection in the posterior fossa.  -workup negative for infection thus far.  COVID negative, CXR unremarkable, UA with no evidence of infection.  Recent admission 12/26-12/30 pt with UTI with improvement in symptoms after treatment with antibiotic however pt also improved with valium.  Past EEG's unremarkable.  -Appreciate Neuro and Psych recs.  Will order EEG.  Will not proceed with valium taper at this time unless pt demonstrates catatonia.  -Rituxan infusion outpatient, we do not have rheumatology at this campus.      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  Resume outpatient.        Frequent falls  PT/OT evaluated last admission 12/26-12/30 and  recommend outpatient PT  Will consult PT/OT to reassess      Anxiety  Continue zoloft  Psych consulted, continue home meds        VTE Risk Mitigation (From admission, onward)         Ordered     IP VTE LOW RISK PATIENT  Once         02/05/22 1641     Place sequential compression device  Until discontinued         02/05/22 1641                Discharge Planning   GABI:      Code Status: Full Code   Is the patient medically ready for discharge?:     Reason for patient still in hospital (select all that apply): Patient trending condition and Imaging                     Clarice Lundy PA-C  Department of Hospital Medicine   [facility]

## 2022-02-06 NOTE — PROCEDURES
Date of service  02/06/2022     Introduction  Electroencephalographic (EEG) recording is recorded with electrodes placed according to the International 10-20 placement system.  Thirty (30) channels of digital signal (sampling rate of 512/sec) were simultaneously recorded from the scalp and may include EKG, EMG, and/or eye monitors.  Recording band pass was 0.1 to 512 Hz.  Digital video recording of the patient is simultaneously recorded with the EEG.  The patient is instructed to report clinical symptoms which may occur during the recording session.  EEG and video recording are stored and archived in digital format.  Activation procedures, which include photic stimulation, hyperventilation and instructing patients to perform simple tasks, are done in selected patients.    The EEG is displayed on a monitor screen and can be reviewed using different montages.  Computer assisted-analysis is employed to detect spike and electrographic seizure activity.  The entire record is submitted for computer analysis.  The entire recording is visually reviewed, and the times identified by computer analysis as being spikes or seizures are reviewed again.    Compressed spectral analysis (CSA) is also performed on the activity recorded from each individual channel.  This is displayed as a power display of frequencies from 0 to 30 Hz over time.  The CSA is reviewed looking for asymmetries in power between homologous areas of the scalp, then compared with the original EEG recording.    Findings  The patient's background consists of predominantly beta range frequencies.  This can be seen as a normal variant or as a medication effect, most commonly from benzodiazepines.  There is no focal slowing.  There are no focal, lateralized, or epileptiform transients.  No electrographic seizures are seen.    During the course of the recording, the patient is noted to be awake, and subsequently becomes drowsy.  Normal sleep architecture is not  appreciated.    Hyperventilation was not performed.  Photic stimulation did not induce pathologic changes in the EEG.    EKG demonstrates sinus rhythm.    Interpretation  This is a normal EEG in an awake and drowsy adult.  Please be aware that a normal EEG does not exclude the possibility of an underlying seizure disorder.

## 2022-02-06 NOTE — PROGRESS NOTES
VN cued into room. No response to VN verbal cues X 2. VN panned camera. Patient appears awake. Respirations even and unlabored. Pt unable to answer questions or follow command. NAD noted.   VN called pts mother on phone to complete admission questions.  VN role explained and informed her that VN would be working with bedside nurse and the rest of the care team.  Fall risk and bed alarm protocol education provided.  .  Allowed time for questions. NAD noted.  Will cont to be available as needed.        02/05/22 1818   Admission   Initial VN Admission Questions Complete   Safety/Activity   Patient Rounds call light in patient/parent reach;visualized patient   Safety Promotion/Fall Prevention Fall Risk reviewed with patient/family

## 2022-02-06 NOTE — PT/OT/SLP EVAL
"Occupational Therapy   Evaluation/Dc Summary     Name: Lore Johnson  MRN: [id number]  Admitting Diagnosis:  Acute encephalopathy  Recent Surgery: * No surgery found *      Recommendations:     Discharge Recommendations: home  Discharge Equipment Recommendations:   (pt requesting follow up regarding orthotics with AFOs)  Barriers to discharge:  None    Assessment:     Lore Johnson is a 36 y.o. female with a medical diagnosis of Acute encephalopathy.  She presents with The primary encounter diagnosis was Dehydration. Diagnoses of Altered mental status and Autoimmune encephalitis were also pertinent to this visit.  . Performance deficits affecting function: weakness,gait instability,impaired balance,impaired endurance,decreased upper extremity function,decreased lower extremity function,decreased ROM,impaired coordination,impaired self care skills,impaired functional mobilty,impaired fine motor,impaired sensation,impaired skin,edema,decreased coordination,pain,abnormal tone (baseline deficits).      Pt reports she is performing at baseline for ADLs and functional mobility. Reports no further therapy concerns. Is requesting CM look into file regarding braces for BLEs that were supposed to be ordered during a previous hospital admit. Owns recommended DME. No further OT needs .    Rehab Prognosis: Fair; patient would benefit from acute skilled OT services to address these deficits and reach maximum level of function.       Plan:     Patient to be seen  (d/c OT) to address the above listed problems via self-care/home management,therapeutic activities,therapeutic exercises  · Plan of Care Expires: 02/06/22  · Plan of Care Reviewed with: patient    Subjective     Chief Complaint: pt reporting significant pain at this time. States, " please don't make me walk"   Patient/Family Comments/goals: return home     Occupational Profile:  Living Environment: with parents and 6yo dghtr in [facility], no steps to enter, t/s combo  Previous level " of function: pt reports mod I toileting, supervision bathing (with set up of TTB and bathroom), mod I dsg and t/fs. States she is able to ambulate PRN without assist, however, 2/2 increased pain, utilizes her power scooter/wc majority of times   Equipment Used at Home:  bedside commode,power chair,slide board,wheelchair,walker, rolling,bath bench  Assistance upon Discharge: from family     Pain/Comfort:  · Pain Rating 1: 8/10  · Location 1:  (feet, neck, legs, hands)  · Pain Addressed 1: Nurse notified  · Pain Rating Post-Intervention 1: 8/10    Patients cultural, spiritual, Episcopal conflicts given the current situation:      Objective:     Communicated with: nsg prior to session.   General Precautions: Standard, fall   Orthopedic Precautions:N/A   Braces: N/A    Occupational Performance:    Bed Mobility:    · Patient completed Scooting/Bridging with modified independence  · Patient completed Supine to Sit with modified independence  · Patient completed Sit to Supine with modified independence    Functional Mobility/Transfers:  · Patient completed Sit <> Stand Transfer with modified independence  with  no assistive device   · Functional Mobility: SBA without AD; wide base of support     Activities of Daily Living:  · N/A     Cognitive/Visual Perceptual:  Impaired insight     Physical Exam:  Balance:    -       WFL sitting; fair/fair + standing   Postural examination/scapula alignment:    -       Rounded shoulders  -       Forward head  Skin integrity: Visible skin intact  Dominant hand:    -       right  Upper Extremity Range of Motion:   BUE WFL for pt's needs, limited ROM of R hand 2/2 IV placement; keeps B hands/digits in flexed position    Upper Extremity Strength:  BUE grossly WFL for pt's needs; graded testing 2/2 pain    Strength:  Limited R hand 2/2 IV site irritation; L WFL   Fine Motor Coordination:  Intact     AMPAC 6 Click ADL:  AMPAC Total Score: 18    Treatment & Education:  Pt performed bed  mobility as above   Functional mobility in room without AD   Pt educated on use of DME to prevent/decrease pain   Elevated RUE and BLEs on pillows to decrease swelling/assist with pain relief   Pt inquiring about order placed for AFOs (unsure if this is what pt is referring to as she is not certain of what was recommended) during previous hospital admit   CM notified   Education:    Patient left supine with all lines intact, call button in reach, bed alarm on, nsg notified and STEPHEN system  present    GOALS:   Multidisciplinary Problems     Occupational Therapy Goals        Problem: Occupational Therapy Goal    Goal Priority Disciplines Outcome Interventions   Occupational Therapy Goal     OT, PT/OT Adequate for Care Transition                    History:     Past Medical History:   Diagnosis Date    Allergy     Anxiety     Hydrocephalus     Hypertension      (ventriculoperitoneal) shunt status     not active currently       Past Surgical History:   Procedure Laterality Date     SECTION      VENTRICULOPERITONEAL SHUNT         Time Tracking:     OT Date of Treatment: 22  OT Start Time: 1047  OT Stop Time: 1106  OT Total Time (min): 19 min    Billable Minutes:Evaluation 9  Therapeutic Activity 10    2022

## 2022-02-06 NOTE — NURSING
Pt. bp 156/109 P 100. Rechecked /101 P99. Notified ANGELA Caldera. No new orders. NP advised RN to monitor pt's bp.

## 2022-02-07 ENCOUNTER — HOSPITAL ENCOUNTER (INPATIENT)
Facility: HOSPITAL | Age: 37
LOS: 2 days | Discharge: HOME | DRG: 546 | End: 2022-02-09
Attending: EMERGENCY MEDICINE | Admitting: HOSPITALIST
Payer: MEDICAID

## 2022-02-07 DIAGNOSIS — R07.9 CHEST PAIN: ICD-10-CM

## 2022-02-07 DIAGNOSIS — F23 ACUTE PSYCHOSIS: ICD-10-CM

## 2022-02-07 DIAGNOSIS — R41.82 ALTERED MENTAL STATUS, UNSPECIFIED ALTERED MENTAL STATUS TYPE: ICD-10-CM

## 2022-02-07 DIAGNOSIS — F30.10 MANIC BEHAVIOR: Primary | ICD-10-CM

## 2022-02-07 DIAGNOSIS — N30.00 ACUTE CYSTITIS WITHOUT HEMATURIA: ICD-10-CM

## 2022-02-07 PROBLEM — F30.9 MANIA: Status: ACTIVE | Noted: 2022-02-07

## 2022-02-07 LAB
ALBUMIN SERPL BCP-MCNC: 4.2 G/DL (ref 3.5–5.2)
ALP SERPL-CCNC: 69 U/L (ref 55–135)
ALT SERPL W/O P-5'-P-CCNC: 20 U/L (ref 10–44)
AMMONIA PLAS-SCNC: 38 UMOL/L (ref 10–50)
ANION GAP SERPL CALC-SCNC: 12 MMOL/L (ref 8–16)
ANISOCYTOSIS BLD QL SMEAR: SLIGHT
ANTI-SSA ANTIBODY: 0.07 RATIO (ref 0–0.99)
ANTI-SSA INTERPRETATION: NEGATIVE
ANTI-SSB ANTIBODY: 0.07 RATIO (ref 0–0.99)
ANTI-SSB INTERPRETATION: NEGATIVE
AST SERPL-CCNC: 23 U/L (ref 10–40)
BACTERIA #/AREA URNS HPF: ABNORMAL /HPF
BASOPHILS # BLD AUTO: 0.04 K/UL (ref 0–0.2)
BASOPHILS NFR BLD: 0.4 % (ref 0–1.9)
BILIRUB SERPL-MCNC: 0.5 MG/DL (ref 0.1–1)
BILIRUB UR QL STRIP: NEGATIVE
BUN SERPL-MCNC: 18 MG/DL (ref 6–20)
CALCIUM SERPL-MCNC: 9.2 MG/DL (ref 8.7–10.5)
CHLORIDE SERPL-SCNC: 106 MMOL/L (ref 95–110)
CLARITY UR: CLEAR
CO2 SERPL-SCNC: 19 MMOL/L (ref 23–29)
COLOR UR: ABNORMAL
CREAT SERPL-MCNC: 0.8 MG/DL (ref 0.5–1.4)
DIFFERENTIAL METHOD: NORMAL
EOSINOPHIL # BLD AUTO: 0.1 K/UL (ref 0–0.5)
EOSINOPHIL NFR BLD: 1.1 % (ref 0–8)
ERYTHROCYTE [DISTWIDTH] IN BLOOD BY AUTOMATED COUNT: 12.8 % (ref 11.5–14.5)
EST. GFR  (AFRICAN AMERICAN): >60 ML/MIN/1.73 M^2
EST. GFR  (NON AFRICAN AMERICAN): >60 ML/MIN/1.73 M^2
GLUCOSE SERPL-MCNC: 103 MG/DL (ref 70–110)
GLUCOSE UR QL STRIP: NEGATIVE
HCT VFR BLD AUTO: 37.6 % (ref 37–48.5)
HGB BLD-MCNC: 12.9 G/DL (ref 12–16)
HGB UR QL STRIP: ABNORMAL
HYALINE CASTS #/AREA URNS LPF: 0 /LPF
HYPOCHROMIA BLD QL SMEAR: NORMAL
IMM GRANULOCYTES # BLD AUTO: 0.02 K/UL (ref 0–0.04)
IMM GRANULOCYTES NFR BLD AUTO: 0.2 % (ref 0–0.5)
KETONES UR QL STRIP: NEGATIVE
LEUKOCYTE ESTERASE UR QL STRIP: ABNORMAL
LYMPHOCYTES # BLD AUTO: 2.1 K/UL (ref 1–4.8)
LYMPHOCYTES NFR BLD: 23.4 % (ref 18–48)
MCH RBC QN AUTO: 30.1 PG (ref 27–31)
MCHC RBC AUTO-ENTMCNC: 34.3 G/DL (ref 32–36)
MCV RBC AUTO: 88 FL (ref 82–98)
MICROSCOPIC COMMENT: ABNORMAL
MONOCYTES # BLD AUTO: 0.7 K/UL (ref 0.3–1)
MONOCYTES NFR BLD: 7.7 % (ref 4–15)
NEUTROPHILS # BLD AUTO: 6.1 K/UL (ref 1.8–7.7)
NEUTROPHILS NFR BLD: 67.2 % (ref 38–73)
NITRITE UR QL STRIP: NEGATIVE
NRBC BLD-RTO: 0 /100 WBC
OVALOCYTES BLD QL SMEAR: NORMAL
PH UR STRIP: 7 [PH] (ref 5–8)
PLATELET # BLD AUTO: 199 K/UL (ref 150–450)
PLATELET BLD QL SMEAR: NORMAL
PMV BLD AUTO: 11 FL (ref 9.2–12.9)
POCT GLUCOSE: 98 MG/DL (ref 70–110)
POIKILOCYTOSIS BLD QL SMEAR: SLIGHT
POTASSIUM SERPL-SCNC: 3.6 MMOL/L (ref 3.5–5.1)
PROT SERPL-MCNC: 7.1 G/DL (ref 6–8.4)
PROT UR QL STRIP: ABNORMAL
RBC # BLD AUTO: 4.28 M/UL (ref 4–5.4)
RBC #/AREA URNS HPF: >100 /HPF (ref 0–4)
SODIUM SERPL-SCNC: 137 MMOL/L (ref 136–145)
SP GR UR STRIP: 1.01 (ref 1–1.03)
SQUAMOUS #/AREA URNS HPF: 5 /HPF
TSH SERPL DL<=0.005 MIU/L-ACNC: 0.79 UIU/ML (ref 0.4–4)
URN SPEC COLLECT METH UR: ABNORMAL
UROBILINOGEN UR STRIP-ACNC: NEGATIVE EU/DL
WBC # BLD AUTO: 9.05 K/UL (ref 3.9–12.7)
WBC #/AREA URNS HPF: 53 /HPF (ref 0–5)

## 2022-02-07 PROCEDURE — 99285 EMERGENCY DEPT VISIT HI MDM: CPT | Mod: 25

## 2022-02-07 PROCEDURE — 82077 ASSAY SPEC XCP UR&BREATH IA: CPT | Performed by: NURSE PRACTITIONER

## 2022-02-07 PROCEDURE — 93010 EKG 12-LEAD: ICD-10-PCS | Mod: ,,, | Performed by: INTERNAL MEDICINE

## 2022-02-07 PROCEDURE — 84443 ASSAY THYROID STIM HORMONE: CPT | Performed by: NURSE PRACTITIONER

## 2022-02-07 PROCEDURE — 85025 COMPLETE CBC W/AUTO DIFF WBC: CPT | Performed by: NURSE PRACTITIONER

## 2022-02-07 PROCEDURE — 87077 CULTURE AEROBIC IDENTIFY: CPT | Performed by: NURSE PRACTITIONER

## 2022-02-07 PROCEDURE — 99215 PR OFFICE/OUTPT VISIT, EST, LEVL V, 40-54 MIN: ICD-10-PCS | Mod: 95,AF,HB, | Performed by: PSYCHIATRY & NEUROLOGY

## 2022-02-07 PROCEDURE — 80307 DRUG TEST PRSMV CHEM ANLYZR: CPT | Performed by: NURSE PRACTITIONER

## 2022-02-07 PROCEDURE — 81000 URINALYSIS NONAUTO W/SCOPE: CPT | Mod: 59 | Performed by: NURSE PRACTITIONER

## 2022-02-07 PROCEDURE — 87186 SC STD MICRODIL/AGAR DIL: CPT | Performed by: NURSE PRACTITIONER

## 2022-02-07 PROCEDURE — 80053 COMPREHEN METABOLIC PANEL: CPT | Performed by: NURSE PRACTITIONER

## 2022-02-07 PROCEDURE — 80143 DRUG ASSAY ACETAMINOPHEN: CPT | Performed by: NURSE PRACTITIONER

## 2022-02-07 PROCEDURE — U0002 COVID-19 LAB TEST NON-CDC: HCPCS | Performed by: NURSE PRACTITIONER

## 2022-02-07 PROCEDURE — 99215 OFFICE O/P EST HI 40 MIN: CPT | Mod: 95,AF,HB, | Performed by: PSYCHIATRY & NEUROLOGY

## 2022-02-07 PROCEDURE — 82140 ASSAY OF AMMONIA: CPT | Performed by: NURSE PRACTITIONER

## 2022-02-07 PROCEDURE — 93005 ELECTROCARDIOGRAM TRACING: CPT

## 2022-02-07 PROCEDURE — G0378 HOSPITAL OBSERVATION PER HR: HCPCS

## 2022-02-07 PROCEDURE — 12000002 HC ACUTE/MED SURGE SEMI-PRIVATE ROOM

## 2022-02-07 PROCEDURE — 87086 URINE CULTURE/COLONY COUNT: CPT | Performed by: NURSE PRACTITIONER

## 2022-02-07 PROCEDURE — 87088 URINE BACTERIA CULTURE: CPT | Performed by: NURSE PRACTITIONER

## 2022-02-07 PROCEDURE — 93010 ELECTROCARDIOGRAM REPORT: CPT | Mod: ,,, | Performed by: INTERNAL MEDICINE

## 2022-02-07 RX ORDER — GLUCAGON 1 MG
1 KIT INJECTION
Status: DISCONTINUED | OUTPATIENT
Start: 2022-02-07 | End: 2022-02-09 | Stop reason: HOSPADM

## 2022-02-07 RX ORDER — IBUPROFEN 200 MG
24 TABLET ORAL
Status: DISCONTINUED | OUTPATIENT
Start: 2022-02-07 | End: 2022-02-09 | Stop reason: HOSPADM

## 2022-02-07 RX ORDER — IBUPROFEN 200 MG
16 TABLET ORAL
Status: DISCONTINUED | OUTPATIENT
Start: 2022-02-07 | End: 2022-02-09 | Stop reason: HOSPADM

## 2022-02-07 RX ORDER — NALOXONE HCL 0.4 MG/ML
0.02 VIAL (ML) INJECTION
Status: DISCONTINUED | OUTPATIENT
Start: 2022-02-07 | End: 2022-02-09 | Stop reason: HOSPADM

## 2022-02-07 RX ORDER — ENOXAPARIN SODIUM 100 MG/ML
40 INJECTION SUBCUTANEOUS EVERY 24 HOURS
Status: DISCONTINUED | OUTPATIENT
Start: 2022-02-07 | End: 2022-02-09 | Stop reason: HOSPADM

## 2022-02-07 RX ORDER — ASPIRIN 325 MG
50000 TABLET, DELAYED RELEASE (ENTERIC COATED) ORAL
COMMUNITY
Start: 2022-01-15

## 2022-02-07 RX ORDER — PYRIDOXINE HCL (VITAMIN B6) 25 MG
TABLET ORAL NIGHTLY
COMMUNITY
Start: 2021-12-22

## 2022-02-07 RX ORDER — METHOCARBAMOL 500 MG/1
TABLET, FILM COATED ORAL
COMMUNITY
Start: 2021-12-28

## 2022-02-07 RX ORDER — ONDANSETRON 2 MG/ML
4 INJECTION INTRAMUSCULAR; INTRAVENOUS EVERY 8 HOURS PRN
Status: DISCONTINUED | OUTPATIENT
Start: 2022-02-07 | End: 2022-02-09 | Stop reason: HOSPADM

## 2022-02-07 RX ORDER — ATORVASTATIN CALCIUM 10 MG/1
TABLET, FILM COATED ORAL
COMMUNITY
Start: 2021-09-28

## 2022-02-07 RX ORDER — DIAZEPAM 5 MG/1
TABLET ORAL
COMMUNITY
Start: 2022-01-01

## 2022-02-07 RX ORDER — ACETAMINOPHEN 325 MG/1
650 TABLET ORAL EVERY 4 HOURS PRN
Status: DISCONTINUED | OUTPATIENT
Start: 2022-02-07 | End: 2022-02-09 | Stop reason: HOSPADM

## 2022-02-07 RX ORDER — PROPRANOLOL HYDROCHLORIDE 20 MG/1
TABLET ORAL
COMMUNITY
Start: 2021-12-28

## 2022-02-07 RX ORDER — SODIUM CHLORIDE 0.9 % (FLUSH) 0.9 %
10 SYRINGE (ML) INJECTION EVERY 12 HOURS PRN
Status: DISCONTINUED | OUTPATIENT
Start: 2022-02-07 | End: 2022-02-09 | Stop reason: HOSPADM

## 2022-02-07 RX ORDER — CYCLOSPORINE 0.5 MG/ML
EMULSION OPHTHALMIC
COMMUNITY
Start: 2022-01-01

## 2022-02-07 NOTE — Clinical Note
Diagnosis: Manic behavior [473869]   Future Attending Provider: LINDA CALLOWAY [[id number]]   Admitting Provider:: LINDA CALLOWAY [[id number]]

## 2022-02-08 PROBLEM — R00.0 TACHYCARDIA: Status: ACTIVE | Noted: 2022-02-08

## 2022-02-08 LAB
AMPHET+METHAMPHET UR QL: NEGATIVE
ANION GAP SERPL CALC-SCNC: 10 MMOL/L (ref 8–16)
APAP SERPL-MCNC: <3 UG/ML (ref 10–20)
BARBITURATES UR QL SCN>200 NG/ML: NEGATIVE
BASOPHILS # BLD AUTO: 0.03 K/UL (ref 0–0.2)
BASOPHILS NFR BLD: 0.4 % (ref 0–1.9)
BENZODIAZ UR QL SCN>200 NG/ML: ABNORMAL
BUN SERPL-MCNC: 14 MG/DL (ref 6–20)
BZE UR QL SCN: NEGATIVE
CALCIUM SERPL-MCNC: 9.5 MG/DL (ref 8.7–10.5)
CANNABINOIDS UR QL SCN: NEGATIVE
CHLORIDE SERPL-SCNC: 104 MMOL/L (ref 95–110)
CO2 SERPL-SCNC: 23 MMOL/L (ref 23–29)
CREAT SERPL-MCNC: 0.8 MG/DL (ref 0.5–1.4)
CREAT UR-MCNC: 36 MG/DL (ref 15–325)
DIFFERENTIAL METHOD: ABNORMAL
EOSINOPHIL # BLD AUTO: 0.1 K/UL (ref 0–0.5)
EOSINOPHIL NFR BLD: 1.3 % (ref 0–8)
ERYTHROCYTE [DISTWIDTH] IN BLOOD BY AUTOMATED COUNT: 12.7 % (ref 11.5–14.5)
EST. GFR  (AFRICAN AMERICAN): >60 ML/MIN/1.73 M^2
EST. GFR  (NON AFRICAN AMERICAN): >60 ML/MIN/1.73 M^2
ETHANOL SERPL-MCNC: <10 MG/DL
GLUCOSE SERPL-MCNC: 113 MG/DL (ref 70–110)
HCT VFR BLD AUTO: 37.9 % (ref 37–48.5)
HGB BLD-MCNC: 12.7 G/DL (ref 12–16)
IMM GRANULOCYTES # BLD AUTO: 0.01 K/UL (ref 0–0.04)
IMM GRANULOCYTES NFR BLD AUTO: 0.1 % (ref 0–0.5)
LYMPHOCYTES # BLD AUTO: 1.2 K/UL (ref 1–4.8)
LYMPHOCYTES NFR BLD: 17.9 % (ref 18–48)
MCH RBC QN AUTO: 30 PG (ref 27–31)
MCHC RBC AUTO-ENTMCNC: 33.5 G/DL (ref 32–36)
MCV RBC AUTO: 89 FL (ref 82–98)
METHADONE UR QL SCN>300 NG/ML: NEGATIVE
MONOCYTES # BLD AUTO: 0.4 K/UL (ref 0.3–1)
MONOCYTES NFR BLD: 6 % (ref 4–15)
NEUTROPHILS # BLD AUTO: 5.1 K/UL (ref 1.8–7.7)
NEUTROPHILS NFR BLD: 74.3 % (ref 38–73)
NRBC BLD-RTO: 0 /100 WBC
OPIATES UR QL SCN: NEGATIVE
PCP UR QL SCN>25 NG/ML: NEGATIVE
PLATELET # BLD AUTO: 222 K/UL (ref 150–450)
PMV BLD AUTO: 10.2 FL (ref 9.2–12.9)
POTASSIUM SERPL-SCNC: 3.9 MMOL/L (ref 3.5–5.1)
RBC # BLD AUTO: 4.24 M/UL (ref 4–5.4)
SARS-COV-2 RDRP RESP QL NAA+PROBE: NEGATIVE
SODIUM SERPL-SCNC: 137 MMOL/L (ref 136–145)
TOXICOLOGY INFORMATION: ABNORMAL
WBC # BLD AUTO: 6.82 K/UL (ref 3.9–12.7)

## 2022-02-08 PROCEDURE — 96372 THER/PROPH/DIAG INJ SC/IM: CPT | Performed by: NURSE PRACTITIONER

## 2022-02-08 PROCEDURE — 80048 BASIC METABOLIC PNL TOTAL CA: CPT | Performed by: NURSE PRACTITIONER

## 2022-02-08 PROCEDURE — 85025 COMPLETE CBC W/AUTO DIFF WBC: CPT | Performed by: NURSE PRACTITIONER

## 2022-02-08 PROCEDURE — 63600175 PHARM REV CODE 636 W HCPCS: Performed by: NURSE PRACTITIONER

## 2022-02-08 PROCEDURE — 25000003 PHARM REV CODE 250: Performed by: NURSE PRACTITIONER

## 2022-02-08 PROCEDURE — 11000001 HC ACUTE MED/SURG PRIVATE ROOM

## 2022-02-08 RX ORDER — METOPROLOL TARTRATE 1 MG/ML
5 INJECTION, SOLUTION INTRAVENOUS ONCE
Status: COMPLETED | OUTPATIENT
Start: 2022-02-08 | End: 2022-02-08

## 2022-02-08 RX ORDER — PROPRANOLOL HYDROCHLORIDE 10 MG/1
20 TABLET ORAL 2 TIMES DAILY
Status: DISCONTINUED | OUTPATIENT
Start: 2022-02-08 | End: 2022-02-09 | Stop reason: HOSPADM

## 2022-02-08 RX ORDER — SERTRALINE HYDROCHLORIDE 50 MG/1
100 TABLET, FILM COATED ORAL DAILY
Status: DISCONTINUED | OUTPATIENT
Start: 2022-02-08 | End: 2022-02-09 | Stop reason: HOSPADM

## 2022-02-08 RX ORDER — HYDRALAZINE HYDROCHLORIDE 20 MG/ML
10 INJECTION INTRAMUSCULAR; INTRAVENOUS EVERY 6 HOURS PRN
Status: DISCONTINUED | OUTPATIENT
Start: 2022-02-08 | End: 2022-02-09 | Stop reason: HOSPADM

## 2022-02-08 RX ADMIN — CEFTRIAXONE SODIUM 1 G: 1 INJECTION, POWDER, FOR SOLUTION INTRAMUSCULAR; INTRAVENOUS at 01:02

## 2022-02-08 RX ADMIN — ENOXAPARIN SODIUM 40 MG: 100 INJECTION SUBCUTANEOUS at 12:02

## 2022-02-08 RX ADMIN — ACETAMINOPHEN 650 MG: 325 TABLET ORAL at 03:02

## 2022-02-08 RX ADMIN — ENOXAPARIN SODIUM 40 MG: 100 INJECTION SUBCUTANEOUS at 05:02

## 2022-02-08 RX ADMIN — METOPROLOL TARTRATE 5 MG: 5 INJECTION, SOLUTION INTRAVENOUS at 02:02

## 2022-02-08 RX ADMIN — PROPRANOLOL HYDROCHLORIDE 20 MG: 10 TABLET ORAL at 11:02

## 2022-02-08 RX ADMIN — HYDRALAZINE HYDROCHLORIDE 10 MG: 20 INJECTION INTRAMUSCULAR; INTRAVENOUS at 10:02

## 2022-02-08 NOTE — ED TRIAGE NOTES
Pt admitted to the ED for manic behavior. Per  EMS they were called out to scene where the patient was having a verbal altercation with her 7 year old child. Per EMS pt had placed her phone and some other personal affects in the toilet. When the child tried to remove the objects from the toilet the patient began to get angry with the child. Pts parents called EMS due to odd behavior and safety concerns.

## 2022-02-08 NOTE — ED NOTES
Bed: EXAM 09  Expected date:   Expected time:   Means of arrival:   Comments:  [facility] - Psych PT

## 2022-02-08 NOTE — ED NOTES
Tele-psych requested to assess manic behavior.  LIAM Loza is the MD on call. Awaiting call back to assess.

## 2022-02-08 NOTE — SUBJECTIVE & OBJECTIVE
Interval History: Remains on PEC with 1:1 sitter. Receiving treatment for UTI.     Review of Systems   Unable to perform ROS: Mental status change     Objective:     Vital Signs (Most Recent):  Temp: 99 °F (37.2 °C) (02/08/22 1128)  Pulse: 108 (02/08/22 1128)  Resp: 17 (02/08/22 1128)  BP: (!) 160/100 (02/08/22 1128)  SpO2: 98 % (02/08/22 1128) Vital Signs (24h Range):  Temp:  [97.7 °F (36.5 °C)-99 °F (37.2 °C)] 99 °F (37.2 °C)  Pulse:  [] 108  Resp:  [17-20] 17  SpO2:  [98 %-100 %] 98 %  BP: (160-175)/(100-115) 160/100     Weight: 67.6 kg (149 lb)  Body mass index is 25.58 kg/m².  No intake or output data in the 24 hours ending 02/08/22 1423   Physical Exam  Vitals and nursing note reviewed.   Constitutional:       General: She is not in acute distress.     Appearance: She is not toxic-appearing.   HENT:      Head: Normocephalic and atraumatic.      Nose: Nose normal.      Mouth/Throat:      Mouth: Mucous membranes are moist.   Eyes:      Pupils: Pupils are equal, round, and reactive to light.   Cardiovascular:      Rate and Rhythm: Regular rhythm. Tachycardia present.      Pulses: Normal pulses.      Heart sounds: Normal heart sounds.   Pulmonary:      Effort: Pulmonary effort is normal.      Breath sounds: Normal breath sounds.   Abdominal:      General: Bowel sounds are normal.      Palpations: Abdomen is soft.   Musculoskeletal:         General: No swelling. Normal range of motion.      Cervical back: Normal range of motion.   Skin:     General: Skin is warm and dry.   Neurological:      Mental Status: She is alert. She is disoriented.         Significant Labs: All pertinent labs within the past 24 hours have been reviewed.    Significant Imaging: I have reviewed all pertinent imaging results/findings within the past 24 hours.

## 2022-02-08 NOTE — ASSESSMENT & PLAN NOTE
Consult psych for eval  -psych recommends consulting Rheumatology as Sjogren's Syndrome can have neuropsychiatric involvement and patient has missed 2 rituximab infusions  -cont zoloft  -PEC'd by ED provider, 1:1 travis

## 2022-02-08 NOTE — ED PROVIDER NOTES
Encounter Date: 2022    SCRIBE #1 NOTE: I, Sunita Rivero, am scribing for, and in the presence of, Awilda Carr NP.       History     Chief Complaint   Patient presents with    Manic Behavior     Per EMS Patient is having Manic Episodes fighting with her 7 year old and saying things that do not make any sense     36-year-old female presents to the ED via EMS due to manic behavior.  [facility] was called to the scene where patient was having a verbal altercation with her 7-year-old daughter. Upon examination, patient had multiple wires in her underwear which she claimed to be for her daughter's tablet. She is answering questions but is not making any sense. Patient has a past medical history of Allergy, Anxiety, Hydrocephalus, Hypertension, and  (ventriculoperitoneal) shunt status. Denies SI or HI. Recent hospitalization in which she was released yesterday for similar presentation.     The history is provided by the patient and the EMS personnel. The history is limited by the condition of the patient.     Review of patient's allergies indicates:   Allergen Reactions    Sulfa (sulfonamide antibiotics) Shortness Of Breath and Swelling     Past Medical History:   Diagnosis Date    Allergy     Anxiety     Hydrocephalus     Hypertension      (ventriculoperitoneal) shunt status     not active currently     Past Surgical History:   Procedure Laterality Date     SECTION      VENTRICULOPERITONEAL SHUNT       Family History   Problem Relation Age of Onset    Diabetes Father     Heart disease Father     Mental illness Father     Hypertension Mother     Breast cancer Neg Hx     Colon cancer Neg Hx     Ovarian cancer Neg Hx     Stroke Neg Hx      Social History     Tobacco Use    Smoking status: Former Smoker    Smokeless tobacco: Never Used   Substance Use Topics    Alcohol use: Yes     Comment: occ    Drug use: No     Review of Systems   Unable to perform ROS: Mental status change    Constitutional: Negative for fever.   Cardiovascular: Negative for chest pain.   Gastrointestinal: Negative for abdominal pain.   Musculoskeletal: Negative for back pain.   Neurological: Negative for headaches.   Psychiatric/Behavioral: Positive for behavioral problems and confusion.       Physical Exam     Initial Vitals [02/07/22 1849]   BP Pulse Resp Temp SpO2   (!) 175/110 (!) 119 18 97.7 °F (36.5 °C) 100 %      MAP       --         Physical Exam    Nursing note and vitals reviewed.  Constitutional: She is not diaphoretic. She appears distressed.   HENT:   Head: Normocephalic and atraumatic.   Nose: Nose normal.   Eyes: EOM are normal. Right eye exhibits no discharge. Left eye exhibits no discharge.   Dilated pupils, reactive.    Neck: Neck supple.   Normal range of motion.  Cardiovascular: Tachycardia present.    Tachycardic   Pulmonary/Chest: Breath sounds normal. No respiratory distress. She has no wheezes. She has no rhonchi. She has no rales.   Abdominal: Abdomen is soft. There is no abdominal tenderness. There is no rebound and no guarding.   Musculoskeletal:         General: Normal range of motion.      Cervical back: Normal range of motion and neck supple.     Neurological: She is alert.   Alert to person. Does not follow commands well. Difficult to obtain neuro exam    Skin: Skin is warm. No rash noted.   Psychiatric: She is slowed. Thought content is delusional. She exhibits a depressed mood. She expresses no homicidal and no suicidal ideation. She exhibits abnormal recent memory.   Flight of ideas, manic, speech slow at times         ED Course   Procedures  Labs Reviewed   CBC W/ AUTO DIFFERENTIAL   COMPREHENSIVE METABOLIC PANEL   TSH   URINALYSIS, REFLEX TO URINE CULTURE   DRUG SCREEN PANEL, URINE EMERGENCY   ALCOHOL,MEDICAL (ETHANOL)   ACETAMINOPHEN LEVEL   SARS-COV-2 RNA AMPLIFICATION, QUAL   AMMONIA          Imaging Results    None          Medications - No data to display  Medical Decision  Making:   Initial Assessment:   36-year-old female presents to the ED via EMS due to manic behavior.  Clinical Tests:   Lab Tests: Ordered and Reviewed          Scribe Attestation:   Scribe #1: I performed the above scribed service and the documentation accurately describes the services I performed. I attest to the accuracy of the note.        ED Course as of 02/07/22 2123 Mon Feb 07, 2022 1919 Neurology paged.  [DT]   1927 Spoke with [facility] neurology- Dr Lu concerning the pts condition at this time. States he can see the pt in the hospital tomorrow.  [DT]   [facility]   2108 Spoke with Shannan concerning admit.  [DT]   2108 Taken from the pts neuro eval on 2/6:      Lore Johnson is a 36 y.o. F w/ Hx of anxiety, HTN, Hydrocephalus s/p  shunt, and recent diagnosis of Sjogren's syndrome in 2021 on rituximab infusions at [facility] with chronic neuropathic pain who is currently hospitalized with c/f acute encephalopathy.      Clinically improved and returned to baseline today. Patient able to recall events in detail leading up to hospitalization, there is no description of any seizure-like activity reported. Prior EEG has been unremarkable, repeat EEG performed today at bedside, again with normal read. Do not anticipate AED needs at this time. There is concern that events leading to hospitalization may have a psychogenic component, further assessment and treatment with psychiatry as outpatient will be beneficial, appreciate psychiatry assistance with case. No further neurologic work up is recommended at this time.      Additionally re-establishing with Rheumatology to restart Rituxan infusions will be helpful and was emphasized to Ms. Johnson at bedside today who voiced understanding. Follow up with primary Neurologist at [facility] as previously scheduled.      Discussed with staff, Dr. Cortes.     Neurology will sign off. Please reach out to resident on call if further questions remain regarding the  neurologic care of this patient.      Javad Hoyt MD   Neurology, PGY-IV [DT]   2111 Taken from psych eval on 2/5      No indication for PEC at this time     -May continue depakote taper as specified in the initial psychiatric consult unless neurology wants to continue medication for seizure PPX.              250 mg po bid x 7 days then               125 mg po bid x 7 days, then stop     -Continue sertaline 100mg po daily targeting anxiety     -Patient will need outpatient mental health follow up. Please provide her with information on local outpatient mental health resources.     -Continue tx of UTI, minimizing benzodiazepines. It appears delirium has improved [DT]   2123 Delay on labs. Nurse obtaining now.  [DT]      ED Course User Index  [DT] Awilda Carr NP             Clinical Impression:   Final diagnoses:  [F30.10] Manic behavior (Primary)  [F23] Acute psychosis  [R41.82] Altered mental status, unspecified altered mental status type          ED Disposition Condition    Observation             I, Awilda Carr NP, personally performed the services described in this documentation.All medical record entries made by the scribe were at my direction and in my presence.I have reviewed the chart and agree that the record reflects my personal performance and is accurate and complete.        Awilda Carr NP  02/07/22 2112       Awilda Carr NP  02/07/22 2123

## 2022-02-08 NOTE — ED NOTES
PEC form has been scanned into EMR. Form has been called in and faxed to the [facility] coroners office.

## 2022-02-08 NOTE — H&P
[facility]  [facility] Medicine  History & Physical    Patient Name: Lore Johnson  MRN: [id number]  Patient Class: OP- Observation  Admission Date: 2/7/2022  Attending Physician: Leah Bonner MD   Primary Care Provider: Mima Lacey MD         Patient information was obtained from patient, EMS personnel, past medical records and ER records.     Subjective:     Principal Problem:Hannah    Chief Complaint:   Chief Complaint   Patient presents with    Manic Behavior     Per EMS Patient is having Manic Episodes fighting with her 7 year old and saying things that do not make any sense        HPI: Lore Johnson is a 35 yo female with a pmh of anxiety, HTN, Hydrocephalus s/p  (ventriculoperitoneal) shunt status, and recent diagnosis of Sjogren's syndrome 2021 on infusions at [facility]. She presented to the ED with reported manic behavior.The history was obtained from the chart due to patient mental status. EMS was called for verbal altercation between patient and 8yo daughter and patient was putting objects in the toilet, including her cellphone. The patient was found to be saying nonsensical things per EMS. She had multiple objects stuffed into her underwear on arrival to the ED today. She was just discharged from here yesterday and has had multiple recent admissions with AMS and UTIs. On recent admission, psychiatry and neurology were consulted. EEG was completed with no seizure activity. CT with no significant change as compared to the previous examination.  Enlargement of the ventricular system with associated large cystic fluid collection in the posterior fossa.  No change or migration of the tubing placed within the cystic collection in the posterior fossa. Today, the patient was found to have a UTI again. She was tachycardic and hypertensive on arrival. She was admitted to hospital medicine service for further care.           Past Medical History:   Diagnosis Date    Allergy     Anxiety     Hydrocephalus      Hypertension      (ventriculoperitoneal) shunt status     not active currently       Past Surgical History:   Procedure Laterality Date     SECTION      VENTRICULOPERITONEAL SHUNT         Review of patient's allergies indicates:   Allergen Reactions    Sulfa (sulfonamide antibiotics) Shortness Of Breath and Swelling       No current facility-administered medications on file prior to encounter.     Current Outpatient Medications on File Prior to Encounter   Medication Sig    atorvastatin (LIPITOR) 10 MG tablet     b complex vitamins tablet Take 1 tablet by mouth once daily.    cholecalciferol, vitamin D3, 1,250 mcg (50,000 unit) capsule Take 50,000 Units by mouth every 7 days.    diazePAM (VALIUM) 5 MG tablet     famotidine (PEPCID) 20 MG tablet Take 1 tablet (20 mg total) by mouth 2 (two) times daily.    ferrous sulfate 325 mg (65 mg iron) Tab tablet Take 325 mg by mouth daily with breakfast.    gabapentin (NEURONTIN) 300 MG capsule Take 3 capsules (900 mg total) by mouth 3 (three) times daily.    hydrOXYzine pamoate (VISTARIL) 25 MG Cap Take 1 capsule (25 mg total) by mouth every 8 (eight) hours as needed.    meclizine (ANTIVERT) 25 mg tablet Take 1 tablet (25 mg total) by mouth 3 (three) times daily as needed for Dizziness.    methocarbamoL (ROBAXIN) 500 MG Tab     ondansetron (ZOFRAN-ODT) 8 MG TbDL Dissolve 1 tablet (8 mg total) by mouth every 8 (eight) hours as needed.    promethazine (PHENERGAN) 25 MG tablet Take 1 tablet (25 mg total) by mouth every 6 (six) hours as needed for Nausea.    propranoloL (INDERAL) 20 MG tablet     RESTASIS 0.05 % ophthalmic emulsion     sertraline (ZOLOFT) 100 MG tablet Take 1 tablet (100 mg total) by mouth once daily.    tiZANidine (ZANAFLEX) 4 MG tablet Take 1 tablet (4 mg total) by mouth every 8 (eight) hours as needed (spasms).    VITAMIN B-6 25 MG tablet Take by mouth nightly.     Family History     Problem Relation (Age of Onset)    Diabetes  Father    Heart disease Father    Hypertension Mother    Mental illness Father        Tobacco Use    Smoking status: Former Smoker    Smokeless tobacco: Never Used   Substance and Sexual Activity    Alcohol use: Yes     Comment: occ    Drug use: No    Sexual activity: Yes     Partners: Male     Birth control/protection: None     Review of Systems   Unable to perform ROS: Mental status change     Objective:     Vital Signs (Most Recent):  Temp: 99 °F (37.2 °C) (02/07/22 2351)  Pulse: (!) 113 (02/07/22 2351)  Resp: 18 (02/07/22 2351)  BP: (!) 170/110 (02/07/22 2351)  SpO2: 100 % (02/07/22 2351) Vital Signs (24h Range):  Temp:  [97.7 °F (36.5 °C)-99 °F (37.2 °C)] 99 °F (37.2 °C)  Pulse:  [113-119] 113  Resp:  [18] 18  SpO2:  [100 %] 100 %  BP: (170-175)/(110) 170/110     Weight: 67.6 kg (149 lb)  Body mass index is 25.58 kg/m².    Physical Exam  Vitals and nursing note reviewed.   Constitutional:       General: She is not in acute distress.     Appearance: She is not toxic-appearing.   HENT:      Head: Normocephalic and atraumatic.      Nose: Nose normal.      Mouth/Throat:      Mouth: Mucous membranes are moist.   Eyes:      Pupils: Pupils are equal, round, and reactive to light.   Cardiovascular:      Rate and Rhythm: Regular rhythm. Tachycardia present.      Pulses: Normal pulses.      Heart sounds: Normal heart sounds.   Pulmonary:      Effort: Pulmonary effort is normal.      Breath sounds: Normal breath sounds.   Abdominal:      General: Bowel sounds are normal.      Palpations: Abdomen is soft.   Musculoskeletal:         General: No swelling. Normal range of motion.      Cervical back: Normal range of motion.   Skin:     General: Skin is warm and dry.   Neurological:      Mental Status: She is alert. She is disoriented.   Psychiatric:      Comments: Speaking nonsense. Calm and cooperative on exam.            CRANIAL NERVES     CN III, IV, VI   Pupils are equal, round, and reactive to light.        Significant Labs: All pertinent labs within the past 24 hours have been reviewed.    Significant Imaging: I have reviewed all pertinent imaging results/findings within the past 24 hours.    Assessment/Plan:     * Hannah  Consult psych for eval  -psych recommends consulting Rheumatology as Sjogren's Syndrome can have neuropsychiatric involvement and patient has missed 2 rituximab infusions  -cont zoloft  -PEC'd by ED provider, 1:1 sitter    Tachycardia  In setting of manic behavior  -lopressor 5mg x 1 dose given   -HR improved      Sjogren's syndrome with peripheral nervous system involvement  -Consult rheumatology to restart rituximab infusions   -we do not have rheumatology or rituximab at [facility]  -consider discussing with rheumatology through transfer center for further recommendations      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

## 2022-02-08 NOTE — ASSESSMENT & PLAN NOTE
-Consult rheumatology to restart rituximab infusions   -we do not have rheumatology or rituximab at [facility]  -consider discussing with rheumatology through transfer center for further recommendations

## 2022-02-08 NOTE — HPI
Lore Johnson is a 35 yo female with a pmh of anxiety, HTN, Hydrocephalus s/p  (ventriculoperitoneal) shunt status, and recent diagnosis of Sjogren's syndrome 2021 on infusions at [facility]. She presented to the ED with reported manic behavior.The history was obtained from the chart due to patient mental status. EMS was called for verbal altercation between patient and 8yo daughter and patient was putting objects in the toilet, including her cellphone. The patient was found to be saying nonsensical things per EMS. She had multiple objects stuffed into her underwear on arrival to the ED today. She was just discharged from here yesterday and has had multiple recent admissions with AMS and UTIs. On recent admission, psychiatry and neurology were consulted. EEG was completed with no seizure activity. CT with no significant change as compared to the previous examination.  Enlargement of the ventricular system with associated large cystic fluid collection in the posterior fossa.  No change or migration of the tubing placed within the cystic collection in the posterior fossa. Today, the patient was found to have a UTI again. She was tachycardic and hypertensive on arrival. She was admitted to hospital medicine service for further care.
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
## 2022-02-08 NOTE — SUBJECTIVE & OBJECTIVE
Past Medical History:   Diagnosis Date    Allergy     Anxiety     Hydrocephalus     Hypertension      (ventriculoperitoneal) shunt status     not active currently       Past Surgical History:   Procedure Laterality Date     SECTION      VENTRICULOPERITONEAL SHUNT         Review of patient's allergies indicates:   Allergen Reactions    Sulfa (sulfonamide antibiotics) Shortness Of Breath and Swelling       No current facility-administered medications on file prior to encounter.     Current Outpatient Medications on File Prior to Encounter   Medication Sig    atorvastatin (LIPITOR) 10 MG tablet     b complex vitamins tablet Take 1 tablet by mouth once daily.    cholecalciferol, vitamin D3, 1,250 mcg (50,000 unit) capsule Take 50,000 Units by mouth every 7 days.    diazePAM (VALIUM) 5 MG tablet     famotidine (PEPCID) 20 MG tablet Take 1 tablet (20 mg total) by mouth 2 (two) times daily.    ferrous sulfate 325 mg (65 mg iron) Tab tablet Take 325 mg by mouth daily with breakfast.    gabapentin (NEURONTIN) 300 MG capsule Take 3 capsules (900 mg total) by mouth 3 (three) times daily.    hydrOXYzine pamoate (VISTARIL) 25 MG Cap Take 1 capsule (25 mg total) by mouth every 8 (eight) hours as needed.    meclizine (ANTIVERT) 25 mg tablet Take 1 tablet (25 mg total) by mouth 3 (three) times daily as needed for Dizziness.    methocarbamoL (ROBAXIN) 500 MG Tab     ondansetron (ZOFRAN-ODT) 8 MG TbDL Dissolve 1 tablet (8 mg total) by mouth every 8 (eight) hours as needed.    promethazine (PHENERGAN) 25 MG tablet Take 1 tablet (25 mg total) by mouth every 6 (six) hours as needed for Nausea.    propranoloL (INDERAL) 20 MG tablet     RESTASIS 0.05 % ophthalmic emulsion     sertraline (ZOLOFT) 100 MG tablet Take 1 tablet (100 mg total) by mouth once daily.    tiZANidine (ZANAFLEX) 4 MG tablet Take 1 tablet (4 mg total) by mouth every 8 (eight) hours as needed (spasms).    VITAMIN B-6 25 MG tablet Take  by mouth nightly.     Family History     Problem Relation (Age of Onset)    Diabetes Father    Heart disease Father    Hypertension Mother    Mental illness Father        Tobacco Use    Smoking status: Former Smoker    Smokeless tobacco: Never Used   Substance and Sexual Activity    Alcohol use: Yes     Comment: occ    Drug use: No    Sexual activity: Yes     Partners: Male     Birth control/protection: None     Review of Systems   Unable to perform ROS: Mental status change     Objective:     Vital Signs (Most Recent):  Temp: 99 °F (37.2 °C) (02/07/22 2351)  Pulse: (!) 113 (02/07/22 2351)  Resp: 18 (02/07/22 2351)  BP: (!) 170/110 (02/07/22 2351)  SpO2: 100 % (02/07/22 2351) Vital Signs (24h Range):  Temp:  [97.7 °F (36.5 °C)-99 °F (37.2 °C)] 99 °F (37.2 °C)  Pulse:  [113-119] 113  Resp:  [18] 18  SpO2:  [100 %] 100 %  BP: (170-175)/(110) 170/110     Weight: 67.6 kg (149 lb)  Body mass index is 25.58 kg/m².    Physical Exam  Vitals and nursing note reviewed.   Constitutional:       General: She is not in acute distress.     Appearance: She is not toxic-appearing.   HENT:      Head: Normocephalic and atraumatic.      Nose: Nose normal.      Mouth/Throat:      Mouth: Mucous membranes are moist.   Eyes:      Pupils: Pupils are equal, round, and reactive to light.   Cardiovascular:      Rate and Rhythm: Regular rhythm. Tachycardia present.      Pulses: Normal pulses.      Heart sounds: Normal heart sounds.   Pulmonary:      Effort: Pulmonary effort is normal.      Breath sounds: Normal breath sounds.   Abdominal:      General: Bowel sounds are normal.      Palpations: Abdomen is soft.   Musculoskeletal:         General: No swelling. Normal range of motion.      Cervical back: Normal range of motion.   Skin:     General: Skin is warm and dry.   Neurological:      Mental Status: She is alert. She is disoriented.   Psychiatric:      Comments: Speaking nonsense. Calm and cooperative on exam.            CRANIAL NERVES      CN III, IV, VI   Pupils are equal, round, and reactive to light.       Significant Labs: All pertinent labs within the past 24 hours have been reviewed.    Significant Imaging: I have reviewed all pertinent imaging results/findings within the past 24 hours.

## 2022-02-08 NOTE — ASSESSMENT & PLAN NOTE
-psych recommends consulting Rheumatology as Sjogren's Syndrome can have neuropsychiatric involvement and patient has missed 2 rituximab infusions  -cont zoloft  -MK'lora by ED provider, 1:1 sitter  -Treat UTI  - Consider rheumatology evaluation if no improvement of symptoms
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
## 2022-02-08 NOTE — ASSESSMENT & PLAN NOTE
-Consult rheumatology to restart rituximab infusions   -we do not have rheumatology or rituximab at [facility]  -consider discussing with rheumatology through transfer center for further recommendations if symptoms don't improve

## 2022-02-09 VITALS
SYSTOLIC BLOOD PRESSURE: 153 MMHG | OXYGEN SATURATION: 98 % | DIASTOLIC BLOOD PRESSURE: 98 MMHG | HEIGHT: 64 IN | WEIGHT: 130.31 LBS | HEART RATE: 78 BPM | BODY MASS INDEX: 22.25 KG/M2 | TEMPERATURE: 98 F | RESPIRATION RATE: 18 BRPM

## 2022-02-09 LAB
ANION GAP SERPL CALC-SCNC: 9 MMOL/L (ref 8–16)
BASOPHILS # BLD AUTO: 0.04 K/UL (ref 0–0.2)
BASOPHILS NFR BLD: 0.4 % (ref 0–1.9)
BUN SERPL-MCNC: 17 MG/DL (ref 6–20)
CALCIUM SERPL-MCNC: 9.2 MG/DL (ref 8.7–10.5)
CHLORIDE SERPL-SCNC: 104 MMOL/L (ref 95–110)
CO2 SERPL-SCNC: 24 MMOL/L (ref 23–29)
CREAT SERPL-MCNC: 0.8 MG/DL (ref 0.5–1.4)
DIFFERENTIAL METHOD: NORMAL
EOSINOPHIL # BLD AUTO: 0.2 K/UL (ref 0–0.5)
EOSINOPHIL NFR BLD: 1.9 % (ref 0–8)
ERYTHROCYTE [DISTWIDTH] IN BLOOD BY AUTOMATED COUNT: 12.6 % (ref 11.5–14.5)
EST. GFR  (AFRICAN AMERICAN): >60 ML/MIN/1.73 M^2
EST. GFR  (NON AFRICAN AMERICAN): >60 ML/MIN/1.73 M^2
GLUCOSE SERPL-MCNC: 123 MG/DL (ref 70–110)
HCT VFR BLD AUTO: 37 % (ref 37–48.5)
HGB BLD-MCNC: 12.2 G/DL (ref 12–16)
IMM GRANULOCYTES # BLD AUTO: 0.02 K/UL (ref 0–0.04)
IMM GRANULOCYTES NFR BLD AUTO: 0.2 % (ref 0–0.5)
LYMPHOCYTES # BLD AUTO: 1.9 K/UL (ref 1–4.8)
LYMPHOCYTES NFR BLD: 21.1 % (ref 18–48)
MCH RBC QN AUTO: 30 PG (ref 27–31)
MCHC RBC AUTO-ENTMCNC: 33 G/DL (ref 32–36)
MCV RBC AUTO: 91 FL (ref 82–98)
MONOCYTES # BLD AUTO: 0.7 K/UL (ref 0.3–1)
MONOCYTES NFR BLD: 8.1 % (ref 4–15)
NEUTROPHILS # BLD AUTO: 6.2 K/UL (ref 1.8–7.7)
NEUTROPHILS NFR BLD: 68.3 % (ref 38–73)
NRBC BLD-RTO: 0 /100 WBC
PLATELET # BLD AUTO: 277 K/UL (ref 150–450)
PMV BLD AUTO: 10.6 FL (ref 9.2–12.9)
POTASSIUM SERPL-SCNC: 3.9 MMOL/L (ref 3.5–5.1)
RBC # BLD AUTO: 4.07 M/UL (ref 4–5.4)
SODIUM SERPL-SCNC: 137 MMOL/L (ref 136–145)
WBC # BLD AUTO: 9.09 K/UL (ref 3.9–12.7)

## 2022-02-09 PROCEDURE — 99223 PR INITIAL HOSPITAL CARE,LEVL III: ICD-10-PCS | Mod: AF,HB,, | Performed by: PSYCHIATRY & NEUROLOGY

## 2022-02-09 PROCEDURE — 90833 PSYTX W PT W E/M 30 MIN: CPT | Mod: AF,HB,, | Performed by: PSYCHIATRY & NEUROLOGY

## 2022-02-09 PROCEDURE — 99223 1ST HOSP IP/OBS HIGH 75: CPT | Mod: AF,HB,, | Performed by: PSYCHIATRY & NEUROLOGY

## 2022-02-09 PROCEDURE — 80048 BASIC METABOLIC PNL TOTAL CA: CPT | Performed by: NURSE PRACTITIONER

## 2022-02-09 PROCEDURE — 36415 COLL VENOUS BLD VENIPUNCTURE: CPT | Performed by: NURSE PRACTITIONER

## 2022-02-09 PROCEDURE — 63600175 PHARM REV CODE 636 W HCPCS: Performed by: NURSE PRACTITIONER

## 2022-02-09 PROCEDURE — 25000003 PHARM REV CODE 250: Performed by: NURSE PRACTITIONER

## 2022-02-09 PROCEDURE — 85025 COMPLETE CBC W/AUTO DIFF WBC: CPT | Performed by: NURSE PRACTITIONER

## 2022-02-09 PROCEDURE — 90833 PR PSYCHOTHERAPY W/PATIENT W/E&M, 30 MIN (ADD ON): ICD-10-PCS | Mod: AF,HB,, | Performed by: PSYCHIATRY & NEUROLOGY

## 2022-02-09 RX ORDER — QUETIAPINE FUMARATE 50 MG/1
50 TABLET, FILM COATED ORAL NIGHTLY
Qty: 30 TABLET | Refills: 0 | Status: SHIPPED | OUTPATIENT
Start: 2022-02-09 | End: 2023-02-09

## 2022-02-09 RX ORDER — NITROFURANTOIN 25; 75 MG/1; MG/1
100 CAPSULE ORAL 2 TIMES DAILY
Qty: 6 CAPSULE | Refills: 0 | Status: SHIPPED | OUTPATIENT
Start: 2022-02-10 | End: 2022-02-13

## 2022-02-09 RX ORDER — SERTRALINE HYDROCHLORIDE 100 MG/1
100 TABLET, FILM COATED ORAL DAILY
Qty: 30 TABLET | Refills: 0 | Status: SHIPPED | OUTPATIENT
Start: 2022-02-09

## 2022-02-09 RX ADMIN — SERTRALINE HYDROCHLORIDE 100 MG: 50 TABLET ORAL at 09:02

## 2022-02-09 RX ADMIN — ENOXAPARIN SODIUM 40 MG: 100 INJECTION SUBCUTANEOUS at 05:02

## 2022-02-09 RX ADMIN — ACETAMINOPHEN 650 MG: 325 TABLET ORAL at 02:02

## 2022-02-09 RX ADMIN — CEFTRIAXONE SODIUM 1 G: 1 INJECTION, POWDER, FOR SOLUTION INTRAMUSCULAR; INTRAVENOUS at 02:02

## 2022-02-09 RX ADMIN — PROPRANOLOL HYDROCHLORIDE 20 MG: 10 TABLET ORAL at 09:02

## 2022-02-09 NOTE — PLAN OF CARE
D/c orders noted, no DME, no HH.     Patrick met with pt to discuss d/c plans. Sw informed pt of upcoming follow up appointments and provided pt with resources to schedule a psych follow up appointment. Pt stated she will schedule a psych follow up appointment upon discharge. Pt's mom will transport pt home at the time of d/c.     Pt is cleared to go from CM standpoint.     Follow up with:  [facility]: [phone]    Future Appointments   Date Time Provider Department Center   2/24/2022  1:00 PM Juliette Sheridan MD [facility]         02/09/22 5937   Final Note   Assessment Type Final Discharge Note   Anticipated Discharge Disposition Home   What phone number can be called within the next 1-3 days to see how you are doing after discharge? [phone]   Hospital Resources/Appts/Education Provided Appointments scheduled by Navigator/Coordinator   Post-Acute Status   Coverage Medicaid   Discharge Delays None known at this time

## 2022-02-09 NOTE — PLAN OF CARE
Safety maintained, bed alarm on and call bell in reach. Telemetry monitoring in progress NSR noted HR in the 80s.No manic symptoms noted at present time.Sitter at bedside. Plan of care reviewed and questions answered. No distress noted. Will continue to monitor Q 2 hr and prn.
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
## 2022-02-09 NOTE — PLAN OF CARE
VN note: VN completed AVS and attachments and notified bedside nurse, Yany. Will cont to be available and intervene prn.

## 2022-02-09 NOTE — NURSING
Patient has been discharged from the facility. IV and telemetry box to be removed prior to departure. Pharmacy to deliver meds to the bedside. Patient's mother, Lo Reyna, is called for pick-up and advises she will be here for 18:00. AVS to be printed and delivered by the bedside nurse and then reviewed by the virtual nurse.

## 2022-02-09 NOTE — PLAN OF CARE
Sw attempt to call pt's mom Lo [phone] to complete discharge assessment, no answer noted. Sw left voicemail. Sw will follow up later. Sw will continue to follow pt for d/c planning.        02/09/22 115   Discharge Assessment   Assessment Type Discharge Planning Assessment   Readmission within 30 days? Yes

## 2022-02-09 NOTE — DISCHARGE INSTRUCTIONS
Urinary Tract Infection Discharge Instructions, Adult (English) View Edit Remove  Bipolar Disorder Discharge Instructions (English) View Edit Remove  Sjögren's Syndrome (English) View Edit Remove  Nitrofurantoin, ADULT (English) View Edit Remove

## 2022-02-09 NOTE — PLAN OF CARE
AVS and educational attachments shared with patient via [employer]. Discussed thoroughly. Patient verbalized clear understanding using teach back method. Notified bedside nurse of completion of education. At present no distress noted. Patient to be discharged via w/c with escort service and family with all of patient's belonings. Will cont to be available to patient and family and intervene prn.

## 2022-02-10 LAB
BACTERIA UR CULT: ABNORMAL
PYRIDOXAL SERPL-MCNC: 7 UG/L (ref 5–50)
VIT B1 BLD-MCNC: 69 UG/L (ref 38–122)

## 2022-02-17 NOTE — DISCHARGE SUMMARY
[facility] Medicine  Discharge Summary      Patient Name: Lore Johnson  MRN: [id number]  Patient Class: IP- Inpatient  Admission Date: 2/7/2022  Hospital Length of Stay: 1 days  Discharge Date and Time: 2/9/2022  6:11 PM  Attending Physician: No att. providers found   Discharging Provider: Antonio Frazier MD  Primary Care Provider: Mima Lacey MD      HPI:   Lore Johnson is a 37 yo female with a pmh of anxiety, HTN, Hydrocephalus s/p  (ventriculoperitoneal) shunt status, and recent diagnosis of Sjogren's syndrome 2021 on infusions at [facility]. She presented to the ED with reported manic behavior.The history was obtained from the chart due to patient mental status. EMS was called for verbal altercation between patient and 6yo daughter and patient was putting objects in the toilet, including her cellphone. The patient was found to be saying nonsensical things per EMS. She had multiple objects stuffed into her underwear on arrival to the ED today. She was just discharged from here yesterday and has had multiple recent admissions with AMS and UTIs. On recent admission, psychiatry and neurology were consulted. EEG was completed with no seizure activity. CT with no significant change as compared to the previous examination.  Enlargement of the ventricular system with associated large cystic fluid collection in the posterior fossa.  No change or migration of the tubing placed within the cystic collection in the posterior fossa. Today, the patient was found to have a UTI again. She was tachycardic and hypertensive on arrival. She was admitted to hospital medicine service for further care.           * No surgery found *      Hospital Course:   See individual problem list.       Goals of Care Treatment Preferences:  Code Status: Full Code      Consults:   Consults (From admission, onward)        Status Ordering Provider     Inpatient consult to Psychiatry  Once        Provider:  (Not yet assigned)    Completed  [facility]     Inpatient consult to Telemedicine - Psychiatry  Once        Provider:  (Not yet assigned)    Completed RUPERT CUELLAR          * Hannah  -psych recommends consulting Rheumatology as Sjogren's Syndrome can have neuropsychiatric involvement and patient has missed 2 rituximab infusions  -cont zoloft  -PEC'd by ED provider, 1:1 sitter  -Treat UTI  - Hannah has resolved. Back to baseline  - Ok for discharge. Complete treatment for UTI  - Follow up with PCP and psychiatry outpatient. Resources given by CM.    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  Case discussed with neurology who recommended no further workup on their end      Anxiety  Cont zoloft      Urinary tract infection, site not specified  -hematuria noted, on menstrual cycle   -rocephin      Acute encephalopathy  See hannah      S/P  shunt          Final Active Diagnoses:    Diagnosis Date Noted POA    PRINCIPAL PROBLEM:  Hannah [F30.9] 02/07/2022 Yes    Tachycardia [R00.0] 02/08/2022 Yes    Sjogren's syndrome with peripheral nervous system involvement [M35.06] 12/27/2021 Yes     Chronic    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    Why: [place] [facility]: [phone]                     Patient Instructions:      Diet Adult Regular     Notify your health care provider if you experience any of the following:  temperature >100.4     Notify your health care provider if you experience any of the following:  persistent nausea and vomiting or diarrhea     Notify your health care provider if you experience any of the following:  difficulty breathing or increased cough     Notify your health care provider if you experience any of the following:  persistent dizziness, light-headedness, or visual disturbances     Notify your health care provider if you experience any of the following:  increased confusion or weakness     Activity as tolerated       Significant Diagnostic Studies: Labs: All labs within the past 24 hours have been reviewed    Pending Diagnostic Studies:     Procedure Component Value Units Date/Time    EKG 12-lead [[id number]] Collected: 02/05/22 0953    Order Status: Sent Lab Status: In process Updated: 02/15/22 0839    Narrative:      Test Reason : F30.10,    Vent. Rate : 108 BPM     Atrial Rate : 108 BPM     P-R Int : 138 ms          QRS Dur : 072 ms      QT Int : 348 ms       P-R-T Axes : 069 064 043 degrees     QTc Int : 466 ms    Sinus tachycardia  Right atrial enlargement  Cannot rule out Anterior infarct (cited on or before 05-FEB-2022)  Abnormal ECG  When compared with ECG of 05-FEB-2022 09:52,  Left posterior fascicular block is no longer Present    Referred By: [email or web address]   SELF           Confirmed By:          Medications:  Reconciled Home Medications:      Medication List      START taking these medications    QUEtiapine 50 MG tablet  Commonly known as: SEROQUEL  Take 1 tablet (50 mg total) by mouth nightly.        CONTINUE taking these medications    atorvastatin 10 MG tablet  Commonly known as: LIPITOR     b complex vitamins tablet  Take 1 tablet by mouth once daily.     cholecalciferol  (vitamin D3) 1,250 mcg (50,000 unit) capsule  Take 50,000 Units by mouth every 7 days.     diazePAM 5 MG tablet  Commonly known as: VALIUM     famotidine 20 MG tablet  Commonly known as: PEPCID  Take 1 tablet (20 mg total) by mouth 2 (two) times daily.     ferrous sulfate 325 mg (65 mg iron) Tab tablet  Commonly known as: FEOSOL  Take 325 mg by mouth daily with breakfast.     gabapentin 300 MG capsule  Commonly known as: NEURONTIN  Take 3 capsules (900 mg total) by mouth 3 (three) times daily.     hydrOXYzine pamoate 25 MG Cap  Commonly known as: VISTARIL  Take 1 capsule (25 mg total) by mouth every 8 (eight) hours as needed.     meclizine 25 mg tablet  Commonly known as: ANTIVERT  Take 1 tablet (25 mg total) by mouth 3 (three) times daily as needed for Dizziness.     methocarbamoL 500 MG Tab  Commonly known as: ROBAXIN     ondansetron 8 MG Tbdl  Commonly known as: ZOFRAN-ODT  Dissolve 1 tablet (8 mg total) by mouth every 8 (eight) hours as needed.     promethazine 25 MG tablet  Commonly known as: PHENERGAN  Take 1 tablet (25 mg total) by mouth every 6 (six) hours as needed for Nausea.     propranoloL 20 MG tablet  Commonly known as: INDERAL     RESTASIS 0.05 % ophthalmic emulsion  Generic drug: cycloSPORINE     sertraline 100 MG tablet  Commonly known as: ZOLOFT  Take 1 tablet (100 mg total) by mouth once daily.     tiZANidine 4 MG tablet  Commonly known as: ZANAFLEX  Take 1 tablet (4 mg total) by mouth every 8 (eight) hours as needed (spasms).     VITAMIN B-6 25 MG Tab  Generic drug: pyridoxine (vitamin B6)  Take by mouth nightly.        ASK your doctor about these medications    nitrofurantoin (macrocrystal-monohydrate) 100 MG capsule  Commonly known as: MACROBID  Take 1 capsule (100 mg total) by mouth 2 (two) times daily. for 3 days  Ask about: Should I take this medication?            Indwelling Lines/Drains at time of discharge:   Lines/Drains/Airways     None                 Time spent on the discharge of  patient: 37 minutes         Antonio Frazier MD  Department of [facility] Medicine  [place] [facility]

## 2022-02-17 NOTE — ASSESSMENT & PLAN NOTE
-psych recommends consulting Rheumatology as Sjogren's Syndrome can have neuropsychiatric involvement and patient has missed 2 rituximab infusions  -cont zoloft  -PEC'd by ED provider, 1:1 sitter  -Treat UTI  - Hannah has resolved. Back to baseline  - Ok for discharge. Complete treatment for UTI  - Follow up with PCP and psychiatry outpatient. Resources given by LEVI.

## 2022-02-17 NOTE — ASSESSMENT & PLAN NOTE
-Consult rheumatology to restart rituximab infusions   -we do not have rheumatology or rituximab at [place]  -consider discussing with rheumatology through transfer center for further recommendations if symptoms don't improve

## 2022-02-18 LAB — NIACIN SERPL-MCNC: 1.63 UG/ML

## 2022-07-15 ENCOUNTER — OFFICE VISIT (OUTPATIENT)
Dept: URGENT CARE | Facility: CLINIC | Age: 37
End: 2022-07-15
Payer: MEDICAID

## 2022-07-15 VITALS
OXYGEN SATURATION: 97 % | WEIGHT: 135 LBS | DIASTOLIC BLOOD PRESSURE: 81 MMHG | SYSTOLIC BLOOD PRESSURE: 115 MMHG | RESPIRATION RATE: 18 BRPM | HEART RATE: 113 BPM | BODY MASS INDEX: 23.05 KG/M2 | HEIGHT: 64 IN | TEMPERATURE: 98 F

## 2022-07-15 DIAGNOSIS — J06.9 URI WITH COUGH AND CONGESTION: Primary | ICD-10-CM

## 2022-07-15 DIAGNOSIS — R05.9 COUGH: ICD-10-CM

## 2022-07-15 LAB
CTP QC/QA: YES
SARS-COV-2 RDRP RESP QL NAA+PROBE: NEGATIVE

## 2022-07-15 PROCEDURE — 3079F DIAST BP 80-89 MM HG: CPT | Mod: CPTII,S$GLB,, | Performed by: PHYSICIAN ASSISTANT

## 2022-07-15 PROCEDURE — 1160F PR REVIEW ALL MEDS BY PRESCRIBER/CLIN PHARMACIST DOCUMENTED: ICD-10-PCS | Mod: CPTII,S$GLB,, | Performed by: PHYSICIAN ASSISTANT

## 2022-07-15 PROCEDURE — 99203 OFFICE O/P NEW LOW 30 MIN: CPT | Mod: S$GLB,,, | Performed by: PHYSICIAN ASSISTANT

## 2022-07-15 PROCEDURE — 3074F PR MOST RECENT SYSTOLIC BLOOD PRESSURE < 130 MM HG: ICD-10-PCS | Mod: CPTII,S$GLB,, | Performed by: PHYSICIAN ASSISTANT

## 2022-07-15 PROCEDURE — 3079F PR MOST RECENT DIASTOLIC BLOOD PRESSURE 80-89 MM HG: ICD-10-PCS | Mod: CPTII,S$GLB,, | Performed by: PHYSICIAN ASSISTANT

## 2022-07-15 PROCEDURE — U0002 COVID-19 LAB TEST NON-CDC: HCPCS | Mod: QW,S$GLB,, | Performed by: PHYSICIAN ASSISTANT

## 2022-07-15 PROCEDURE — 1159F MED LIST DOCD IN RCRD: CPT | Mod: CPTII,S$GLB,, | Performed by: PHYSICIAN ASSISTANT

## 2022-07-15 PROCEDURE — 99203 PR OFFICE/OUTPT VISIT, NEW, LEVL III, 30-44 MIN: ICD-10-PCS | Mod: S$GLB,,, | Performed by: PHYSICIAN ASSISTANT

## 2022-07-15 PROCEDURE — 3008F BODY MASS INDEX DOCD: CPT | Mod: CPTII,S$GLB,, | Performed by: PHYSICIAN ASSISTANT

## 2022-07-15 PROCEDURE — U0002: ICD-10-PCS | Mod: QW,S$GLB,, | Performed by: PHYSICIAN ASSISTANT

## 2022-07-15 PROCEDURE — 1159F PR MEDICATION LIST DOCUMENTED IN MEDICAL RECORD: ICD-10-PCS | Mod: CPTII,S$GLB,, | Performed by: PHYSICIAN ASSISTANT

## 2022-07-15 PROCEDURE — 3008F PR BODY MASS INDEX (BMI) DOCUMENTED: ICD-10-PCS | Mod: CPTII,S$GLB,, | Performed by: PHYSICIAN ASSISTANT

## 2022-07-15 PROCEDURE — 3074F SYST BP LT 130 MM HG: CPT | Mod: CPTII,S$GLB,, | Performed by: PHYSICIAN ASSISTANT

## 2022-07-15 PROCEDURE — 1160F RVW MEDS BY RX/DR IN RCRD: CPT | Mod: CPTII,S$GLB,, | Performed by: PHYSICIAN ASSISTANT

## 2022-07-15 RX ORDER — RISPERIDONE 90 MG
90 KIT SUBCUTANEOUS
COMMUNITY
Start: 2022-05-04

## 2022-07-15 RX ORDER — TIZANIDINE 4 MG/1
4 TABLET ORAL EVERY 8 HOURS PRN
COMMUNITY
Start: 2022-07-04

## 2022-07-15 RX ORDER — RISPERIDONE 50 MG/2 ML
90 KIT INTRAMUSCULAR
COMMUNITY

## 2022-07-15 RX ORDER — AMLODIPINE BESYLATE 5 MG/1
5 TABLET ORAL DAILY
COMMUNITY
Start: 2022-06-17

## 2022-07-15 RX ORDER — LEVOCETIRIZINE DIHYDROCHLORIDE 5 MG/1
5 TABLET, FILM COATED ORAL NIGHTLY
Qty: 14 TABLET | Refills: 0 | Status: SHIPPED | OUTPATIENT
Start: 2022-07-15

## 2022-07-15 RX ORDER — TRAZODONE HYDROCHLORIDE 150 MG/1
150 TABLET ORAL NIGHTLY
COMMUNITY
Start: 2022-07-01

## 2022-07-15 RX ORDER — BENZONATATE 100 MG/1
100 CAPSULE ORAL 3 TIMES DAILY PRN
Qty: 15 CAPSULE | Refills: 0 | Status: SHIPPED | OUTPATIENT
Start: 2022-07-15

## 2022-07-15 NOTE — PROGRESS NOTES
"Subjective:       Patient ID: Lore Johnson is a 36 y.o. female.    Vitals:  height is 5' 4" (1.626 m) and weight is 61.2 kg (135 lb). Her temperature is 98.4 °F (36.9 °C). Her blood pressure is 115/81 and her pulse is 113 (abnormal). Her respiration is 18 and oxygen saturation is 97%.     Chief Complaint: Cough    Patient presents to the clinic with cough and congestion x 3 weeks.    Patient provider note starts here:  Patient presents to the clinic with complaints of a dry cough and nasal congestion for the past 2-3 weeks.  Reports that her parents recently tested positive for COVID-19 and that she is concerned that she has COVID as well.  Reports she has been taking Tussin for her symptoms with symptomatic relief.  Denies fever, chest pain, shortness of breath.  She has been vaccinated against COVID-19.    Cough  This is a recurrent problem. The current episode started 1 to 4 weeks ago. The problem has been gradually worsening. The problem occurs constantly. The cough is productive of sputum. Associated symptoms include ear congestion, headaches, nasal congestion, postnasal drip and a sore throat. Pertinent negatives include no chest pain, ear pain, fever, shortness of breath, sweats or wheezing. She has tried OTC cough suppressant for the symptoms. The treatment provided moderate relief.       Constitution: Negative for fever.   HENT: Positive for postnasal drip and sore throat. Negative for ear pain and ear discharge.    Neck: Negative for neck pain.   Cardiovascular: Negative for chest pain, palpitations and sob on exertion.   Respiratory: Positive for cough. Negative for chest tightness, sputum production, shortness of breath and wheezing.    Gastrointestinal: Negative for abdominal pain, vomiting and diarrhea.   Skin: Negative for color change and wound.   Neurological: Positive for headaches. Negative for numbness and tingling.       Objective:      Physical Exam   Constitutional: She is oriented to person, " place, and time. She appears well-developed. She is cooperative.  Non-toxic appearance. She does not appear ill. No distress.   HENT:   Head: Normocephalic and atraumatic.   Ears:   Right Ear: Hearing, tympanic membrane, external ear and ear canal normal.   Left Ear: Hearing, tympanic membrane, external ear and ear canal normal.   Nose: Nose normal. No mucosal edema, rhinorrhea, nasal deformity or congestion. No epistaxis. Right sinus exhibits no maxillary sinus tenderness and no frontal sinus tenderness. Left sinus exhibits no maxillary sinus tenderness and no frontal sinus tenderness.   Mouth/Throat: Uvula is midline, oropharynx is clear and moist and mucous membranes are normal. No trismus in the jaw. Normal dentition. No uvula swelling. No oropharyngeal exudate, posterior oropharyngeal edema or posterior oropharyngeal erythema.   Eyes: Conjunctivae and lids are normal. No scleral icterus.   Neck: Trachea normal and phonation normal. Neck supple. No edema present. No erythema present. No neck rigidity present.   Cardiovascular: Normal rate, regular rhythm, normal heart sounds and normal pulses.   Pulmonary/Chest: Effort normal and breath sounds normal. No respiratory distress. She has no decreased breath sounds. She has no wheezes. She has no rhonchi.   Abdominal: Normal appearance.   Musculoskeletal: Normal range of motion.         General: No deformity. Normal range of motion.   Neurological: She is alert and oriented to person, place, and time. She exhibits normal muscle tone. Coordination normal.   Skin: Skin is warm, dry, intact, not diaphoretic and not pale.   Psychiatric: Her speech is normal and behavior is normal. Judgment and thought content normal.   Nursing note and vitals reviewed.        Assessment:       1. URI with cough and congestion    2. Cough        Results for orders placed or performed in visit on 07/15/22   POCT COVID-19 Rapid Screening   Result Value Ref Range    POC Rapid COVID Negative  Negative     Acceptable Yes        Plan:         URI with cough and congestion  -     benzonatate (TESSALON) 100 MG capsule; Take 1 capsule (100 mg total) by mouth 3 (three) times daily as needed for Cough.  Dispense: 15 capsule; Refill: 0  -     levocetirizine (XYZAL) 5 MG tablet; Take 1 tablet (5 mg total) by mouth every evening.  Dispense: 14 tablet; Refill: 0    Cough  -     POCT COVID-19 Rapid Screening           Medical Decision Making:   History:   Old Medical Records: I decided to obtain old medical records.  Differential Diagnosis:   Differential diagnosis includes but is not limited to: viral vs bacterial URI, pharyngitis, otitis, COVID 19, influenza, pneumonia.    Clinical Tests:   Lab Tests: Reviewed and Ordered       <> Summary of Lab: COVID negative  Urgent Care Management:  Patient presents with complaints of URI like symptoms.  On exam, she is afebrile and nontoxic appearing.  Reports dry cough now for the past 2-3 weeks.  Likely postnasal in nature.  She has tested negative for COVID-19.  I have prescribed symptomatic treatment and encouraged to follow up with primary care.  She verbalized understanding and agreed with plan.       Patient Instructions   You must understand that you've received an Urgent Care treatment only and that you may be released before all your medical problems are known or treated. You, the patient, will arrange for follow up care as instructed.      Follow up with your PCP or specialty clinic as instructed in the next 2-3 days if not improved or as needed. You can call [phone] to schedule an appointment with appropriate provider.      If you condition worsens, we recommend that you receive another evaluation at the emergency room immediately or contact your primary medical clinic's after hours call service to discuss your concerns.      Please return here or go to the Emergency Department for any concerns or worsening condition.

## 2022-07-15 NOTE — PATIENT INSTRUCTIONS
You must understand that you've received an Urgent Care treatment only and that you may be released before all your medical problems are known or treated. You, the patient, will arrange for follow up care as instructed.      Follow up with your PCP or specialty clinic as instructed in the next 2-3 days if not improved or as needed. You can call [phone] to schedule an appointment with appropriate provider.      If you condition worsens, we recommend that you receive another evaluation at the emergency room immediately or contact your primary medical clinic's after hours call service to discuss your concerns.      Please return here or go to the Emergency Department for any concerns or worsening condition.

## 2023-11-16 ENCOUNTER — OFFICE VISIT (OUTPATIENT)
Dept: URGENT CARE | Facility: CLINIC | Age: 38
End: 2023-11-16
Payer: MEDICARE

## 2023-11-16 VITALS
SYSTOLIC BLOOD PRESSURE: 151 MMHG | WEIGHT: 134.5 LBS | TEMPERATURE: 99 F | HEART RATE: 108 BPM | HEIGHT: 64 IN | DIASTOLIC BLOOD PRESSURE: 100 MMHG | OXYGEN SATURATION: 95 % | BODY MASS INDEX: 22.96 KG/M2 | RESPIRATION RATE: 18 BRPM

## 2023-11-16 DIAGNOSIS — I10 ELEVATED BLOOD PRESSURE READING IN OFFICE WITH DIAGNOSIS OF HYPERTENSION: ICD-10-CM

## 2023-11-16 DIAGNOSIS — S81.012A LACERATION OF LEFT KNEE, INITIAL ENCOUNTER: Primary | ICD-10-CM

## 2023-11-16 DIAGNOSIS — S81.012A LACERATION OF SKIN OF LEFT KNEE, INITIAL ENCOUNTER: ICD-10-CM

## 2023-11-16 DIAGNOSIS — R52 PAIN: ICD-10-CM

## 2023-11-16 PROCEDURE — 99213 OFFICE O/P EST LOW 20 MIN: CPT | Mod: 25,S$GLB,, | Performed by: PHYSICIAN ASSISTANT

## 2023-11-16 PROCEDURE — 12002 RPR S/N/AX/GEN/TRNK2.6-7.5CM: CPT | Mod: S$GLB,,, | Performed by: PHYSICIAN ASSISTANT

## 2023-11-16 PROCEDURE — 12002 LACERATION REPAIR: ICD-10-PCS | Mod: S$GLB,,, | Performed by: PHYSICIAN ASSISTANT

## 2023-11-16 PROCEDURE — 99213 PR OFFICE/OUTPT VISIT, EST, LEVL III, 20-29 MIN: ICD-10-PCS | Mod: 25,S$GLB,, | Performed by: PHYSICIAN ASSISTANT

## 2023-11-16 RX ORDER — IBUPROFEN 800 MG/1
800 TABLET ORAL EVERY 6 HOURS PRN
Qty: 30 TABLET | Refills: 0 | Status: SHIPPED | OUTPATIENT
Start: 2023-11-18 | End: 2023-11-28

## 2023-11-16 RX ORDER — KETOROLAC TROMETHAMINE 30 MG/ML
30 INJECTION, SOLUTION INTRAMUSCULAR; INTRAVENOUS
Status: COMPLETED | OUTPATIENT
Start: 2023-11-16 | End: 2023-11-16

## 2023-11-16 RX ORDER — MUPIROCIN 20 MG/G
OINTMENT TOPICAL 3 TIMES DAILY
Qty: 30 G | Refills: 0 | Status: SHIPPED | OUTPATIENT
Start: 2023-11-16 | End: 2023-11-26

## 2023-11-16 RX ORDER — CEPHALEXIN 500 MG/1
500 CAPSULE ORAL EVERY 8 HOURS
Qty: 21 CAPSULE | Refills: 0 | Status: SHIPPED | OUTPATIENT
Start: 2023-11-16 | End: 2023-11-23

## 2023-11-16 RX ADMIN — KETOROLAC TROMETHAMINE 30 MG: 30 INJECTION, SOLUTION INTRAMUSCULAR; INTRAVENOUS at 05:11

## 2023-11-16 NOTE — PROCEDURES
Laceration Repair    Date/Time: 11/16/2023 2:45 PM    Performed by: Mery Lacey PA-C  Authorized by: Mery Lacey PA-C  Body area: lower extremity  Location details: left knee  Laceration length: 5.8 cm  Foreign bodies: no foreign bodies  Tendon involvement: none  Nerve involvement: none  Vascular damage: no  Anesthesia: local infiltration    Anesthesia:  Local Anesthetic: lidocaine 1% without epinephrine  Anesthetic total: 5 mL    Patient sedated: no  Preparation: Patient was prepped and draped in the usual sterile fashion.  Irrigation method: syringe  Amount of cleaning: standard  Debridement: none  Degree of undermining: none  Skin closure: 3-0 Prolene and 4-0 Prolene  Number of sutures: 15  Technique: simple  Approximation: close  Approximation difficulty: simple  Dressing: non-stick sterile dressing and pressure/compression dressing  Patient tolerance: Patient tolerated the procedure well with no immediate complications

## 2023-11-16 NOTE — PROGRESS NOTES
"Subjective:      Patient ID: Lore Johnson is a 38 y.o. female.    Vitals:  height is 5' 4" (1.626 m) and weight is 61 kg (134 lb 7.7 oz). Her oral temperature is 98.8 °F (37.1 °C). Her blood pressure is 151/100 (abnormal) and her pulse is 108. Her respiration is 18 and oxygen saturation is 95%.     Chief Complaint: Laceration    Lore Johnson is a 38 y.o. female with hypertension, allergic rhinitis, and anxiety who complains of laceration on left knee. Stated fel onto ceramic tile. Treatment include nothing at home. Pain 05/10.       last Tdap was 5/22.    Laceration   The incident occurred less than 1 hour ago. Pain location: left knee. The laceration is 4 cm in size. The pain is at a severity of 5/10. The pain is moderate. The pain has been Constant since onset. It is unknown if a foreign body is present. Her tetanus status is UTD (5/13/22).       Constitution: Negative for chills and fever.   HENT:  Negative for ear pain, ear discharge, drooling, congestion, postnasal drip, sinus pain, sinus pressure and sore throat.    Neck: Negative for neck swelling.   Respiratory:  Negative for chest tightness, sputum production, COPD, shortness of breath, wheezing and asthma.    Genitourinary:  Negative for dysuria, frequency, urgency and urine decreased.   Musculoskeletal:  Positive for pain, trauma and joint pain.   Skin:  Positive for laceration and bruising. Negative for abscess.   Allergic/Immunologic: Negative for environmental allergies, seasonal allergies and asthma.   Neurological:  Negative for headaches.   Hematologic/Lymphatic: Negative for easy bruising/bleeding, history of blood clots and history of bleeding disorder. Does not bruise/bleed easily.   Psychiatric/Behavioral:  Negative for nervous/anxious. The patient is not nervous/anxious.       Past Medical History:   Diagnosis Date    Allergy     Anxiety     Hydrocephalus     Hypertension      (ventriculoperitoneal) shunt status     not active currently "     Current Outpatient Medications on File Prior to Visit   Medication Sig Dispense Refill    amLODIPine (NORVASC) 5 MG tablet Take 5 mg by mouth once daily.      atorvastatin (LIPITOR) 10 MG tablet       b complex vitamins tablet Take 1 tablet by mouth once daily.      benzonatate (TESSALON) 100 MG capsule Take 1 capsule (100 mg total) by mouth 3 (three) times daily as needed for Cough. 15 capsule 0    cholecalciferol, vitamin D3, 1,250 mcg (50,000 unit) capsule Take 50,000 Units by mouth every 7 days.      diazePAM (VALIUM) 5 MG tablet       ferrous sulfate 325 mg (65 mg iron) Tab tablet Take 325 mg by mouth daily with breakfast.      hydrOXYzine pamoate (VISTARIL) 25 MG Cap Take 1 capsule (25 mg total) by mouth every 8 (eight) hours as needed. 60 capsule 2    levocetirizine (XYZAL) 5 MG tablet Take 1 tablet (5 mg total) by mouth every evening. 14 tablet 0    meclizine (ANTIVERT) 25 mg tablet Take 1 tablet (25 mg total) by mouth 3 (three) times daily as needed for Dizziness. 20 tablet 0    medroxyprogesterone (DEPO-SUBQ PROVERA) 104 mg/0.65 mL injection Inject 104 mg into the skin.      methocarbamoL (ROBAXIN) 500 MG Tab       ondansetron (ZOFRAN-ODT) 8 MG TbDL Dissolve 1 tablet (8 mg total) by mouth every 8 (eight) hours as needed. 20 tablet 0    promethazine (PHENERGAN) 25 MG tablet Take 1 tablet (25 mg total) by mouth every 6 (six) hours as needed for Nausea. 15 tablet 0    propranoloL (INDERAL) 20 MG tablet       RESTASIS 0.05 % ophthalmic emulsion       risperiDONE (PERSERIS) 90 mg sers 90 mg.      risperiDONE microspheres (RISPERDAL CONSTA) 50 mg/2 mL injection Inject 90 mg into the muscle.      sertraline (ZOLOFT) 100 MG tablet Take 1 tablet (100 mg total) by mouth once daily. 30 tablet 0    tiZANidine (ZANAFLEX) 4 MG tablet Take 4 mg by mouth every 8 (eight) hours as needed.      traZODone (DESYREL) 150 MG tablet Take 150 mg by mouth nightly.      VITAMIN B-6 25 MG tablet Take by mouth nightly.       famotidine (PEPCID) 20 MG tablet Take 1 tablet (20 mg total) by mouth 2 (two) times daily. 60 tablet 1    gabapentin (NEURONTIN) 300 MG capsule Take 3 capsules (900 mg total) by mouth 3 (three) times daily. 270 capsule 0    QUEtiapine (SEROQUEL) 50 MG tablet Take 1 tablet (50 mg total) by mouth nightly. (Patient not taking: Reported on 7/15/2022) 30 tablet 0     No current facility-administered medications on file prior to visit.     Past Surgical History:   Procedure Laterality Date     SECTION      VENTRICULOPERITONEAL SHUNT       Review of patient's allergies indicates:   Allergen Reactions    Sulfa (sulfonamide antibiotics) Shortness Of Breath and Swelling       Objective:     Physical Exam   Constitutional: She is oriented to person, place, and time. She appears well-developed. She is cooperative.   HENT:   Head: Normocephalic and atraumatic.   Ears:   Right Ear: Hearing, tympanic membrane, external ear and ear canal normal.   Left Ear: Hearing, tympanic membrane, external ear and ear canal normal.   Nose: Nose normal. No mucosal edema or nasal deformity. No epistaxis. Right sinus exhibits no maxillary sinus tenderness and no frontal sinus tenderness. Left sinus exhibits no maxillary sinus tenderness and no frontal sinus tenderness.   Mouth/Throat: Uvula is midline, oropharynx is clear and moist and mucous membranes are normal. Mucous membranes are moist. No trismus in the jaw. Normal dentition. No uvula swelling. Oropharynx is clear.   Eyes: Conjunctivae and lids are normal. Pupils are equal, round, and reactive to light. Extraocular movement intact   Neck: Trachea normal and phonation normal. Neck supple.   Cardiovascular: Normal rate, regular rhythm, normal heart sounds and normal pulses.   Pulmonary/Chest: Effort normal and breath sounds normal.   Abdominal: Normal appearance.   Musculoskeletal: Normal range of motion.         General: Signs of injury present. Normal range of motion.   Neurological:  She is alert and oriented to person, place, and time. She exhibits normal muscle tone.   Skin: Skin is warm, dry and intact. Capillary refill takes less than 2 seconds. lesion         Comments: 5.8 cm Laceration to left knee with clean edges; 2 cm laceration with clean edges to lower left knee   Psychiatric: Her speech is normal and behavior is normal. Judgment and thought content normal.   Nursing note and vitals reviewed.                5.8 cm  2 cm    Assessment:     1. Laceration of left knee, initial encounter    2. Pain    3. Laceration of skin of left knee, initial encounter    4. Elevated blood pressure reading in office with diagnosis of hypertension    Patient presents with clinical exam findings and history consistent with above.      On exam, patient is nontoxic appearing and vitals are stable.        Diagnostic testing results were reviewed and discussed with patient/guardian.   Tests ordered in clinic:None:  Previous progress notes/admissions/labs and medications were reviewed.      Plan:       Laceration of left knee, initial encounter  -     cephALEXin (KEFLEX) 500 MG capsule; Take 1 capsule (500 mg total) by mouth every 8 (eight) hours. for 7 days  Dispense: 21 capsule; Refill: 0  -     mupirocin (BACTROBAN) 2 % ointment; Apply topically 3 (three) times daily. for 10 days  Dispense: 30 g; Refill: 0  -     ibuprofen (ADVIL,MOTRIN) 800 MG tablet; Take 1 tablet (800 mg total) by mouth every 6 (six) hours as needed for Pain.  Dispense: 30 tablet; Refill: 0  -     Laceration Repair    Pain  -     ketorolac injection 30 mg    Laceration of skin of left knee, initial encounter  -     Laceration Repair  -     Ambulatory referral/consult to Family Practice    Elevated blood pressure reading in office with diagnosis of hypertension  -     Ambulatory referral/consult to Family Practice    Other orders  -     Cancel: Laceration Repair      Laceration Repair    Date/Time: 11/16/2023 2:45 PM    Performed by:  Mery Lacey PA-C  Authorized by: Mery Lacey PA-C  Body area: lower extremity  Location details: left knee  Laceration length: 5.8 cm  Foreign bodies: no foreign bodies  Tendon involvement: none  Nerve involvement: none  Vascular damage: no  Anesthesia: local infiltration    Anesthesia:  Local Anesthetic: lidocaine 1% without epinephrine  Anesthetic total: 5 mL    Patient sedated: no  Preparation: Patient was prepped and draped in the usual sterile fashion.  Irrigation method: syringe  Amount of cleaning: standard  Debridement: none  Degree of undermining: none  Skin closure: 3-0 Prolene and 4-0 Prolene  Number of sutures: 15  Technique: simple  Approximation: close  Approximation difficulty: simple  Dressing: non-stick sterile dressing and pressure/compression dressing  Patient tolerance: Patient tolerated the procedure well with no immediate complications          Laceration Repair    Date/Time: 11/16/2023 2:45 PM    Performed by: Mery Lacey PA-C  Authorized by: Mery Lacey PA-C  Laceration length: 2 cm  Foreign bodies: no foreign bodies  Vascular damage: no    Patient sedated: no  Preparation: Patient was prepped and draped in the usual sterile fashion.  Irrigation solution: saline  Irrigation method: syringe  Debridement: none  Degree of undermining: none  Skin closure: glue and Steri-Strips  Approximation difficulty: simple  Dressing: pressure/compression dressing and non-stick sterile dressing  Patient tolerance: Patient tolerated the procedure well with no immediate complications               Additional MDM:     Heart Failure Score:   COPD = No          1) See orders for this visit as documented in the electronic medical record.  2) Symptomatic therapy suggested: use acetaminophen/ibuprofen every 6-8 hours prn pain or fever, push fluids.   3) Call or return to clinic prn if these symptoms worsen or fail to improve as anticipated.    Discussed results/diagnosis/plan with patient in clinic.  We had  "shared decision making for patient's treatment. Patient verbalized understanding and in agreement with current treatment plan.     Patient was instructed to return for re-evaluation with urgent care or PCP for continued outpatient workup and management if symptoms do not improve/worsening symptoms. Strict ED versus clinic precautions given in depth.    Discharge and follow-up instructions given verbally/printed with the patient who expressed understanding. The instructions and results are also [facility]              Mery "Rachel" LIZZY Lacey          Patient Instructions   Wound instructions after dermabond glue:  Keep your wound clean and dry for the first 24 hours. After 24 hours, you can gently wash the wound with soap and water or take a shower. Gently pat the wound dry. Do not soak your wound by bathing or swimming.  You do not need to bandage a wound that was closed with skin glue. The glue works like a bandage.  Do not use antibiotic ointment (sample brand names: Polysporin, Bacitracin) - This can cause the glue to break down too quickly.  You can shower while the glue is on your skin, but do not take a bath, soak in water, or scrub the area for 7 to 10 days. Dry your skin by patting it gently with a towel.  The glue will peel off on its own, usually in 5 to 10 days. If the glue is still on your skin after 10 days, you can use antibiotic ointment or petroleum jelly (sample brand name: Vaseline) to get it off.       Please keep your wound covered as it heals. Use a thin layer of antibiotic ointment (mupirocin) to help keep the wound moist. This will also keep the dressing from sticking to the wound.  After 24 hours injury, you can gently wash the wound with soap and water. Pat dry and put on a clean dressing. A telfa pad will not stick to the wound.  Change your dressing once a day or every other day.  Always wash your hands before and after touching the wound.  Each time you change the dressing, " look closely at the wound to be sure it is healing the right way. The wound may have a yellowish discharge, and this is normal.  Avoid picking the scab or scratching the site which may cause more irritation.  Do not soak in water or swim with an open wound.  Do not use hydrogen peroxide; it will cause the wound to dry out or delay wound healing/new skin growth.  Please complete full course of oral antibiotics.   Please return in 14 days on/after 11/30/23 for suture removal.              Please remember that you have received care at an urgent care today. Urgent cares are not emergency rooms and are not equipped to handle life threatening emergencies and cannot rule in or out certain medical conditions and you may be released before all of your medical problems are known or treated. Please arrange follow up with your primary care physician or speciality clinic  within 2-5 days if your signs and symptoms have not resolved or worsen. Patient can call our Referral Hotline at [phone] to make an appointment.    Please return here or go to the Emergency Department for any concerns or worsening of condition.Patient was educated on signs/symptoms that would warrant emergent medical attention. Patient verbalized understanding.  Signs of infection. These include a fever of 100.4°F (38°C) or higher, chills, or wound that will not heal.  The pain in and around the area gets much worse.  There is a bad smell or pus (thick yellow, green, or gray fluid) coming from your wound.  You notice a crunchy feeling or blisters in the skin around the wound.  The redness around your wound gets bigger or is spreading up your arm or leg.  Fluid that is not pus drains from your wound.  Your swelling doesnt improve or gets worse.

## 2023-11-16 NOTE — PROCEDURES
Laceration Repair    Date/Time: 11/16/2023 2:45 PM    Performed by: Mery Lacey PA-C  Authorized by: Mery Lacey PA-C  Laceration length: 2 cm  Foreign bodies: no foreign bodies  Vascular damage: no    Patient sedated: no  Preparation: Patient was prepped and draped in the usual sterile fashion.  Irrigation solution: saline  Irrigation method: syringe  Debridement: none  Degree of undermining: none  Skin closure: glue and Steri-Strips  Approximation difficulty: simple  Dressing: pressure/compression dressing and non-stick sterile dressing  Patient tolerance: Patient tolerated the procedure well with no immediate complications

## 2023-11-16 NOTE — PATIENT INSTRUCTIONS
Wound instructions after dermabond glue:  Keep your wound clean and dry for the first 24 hours. After 24 hours, you can gently wash the wound with soap and water or take a shower. Gently pat the wound dry. Do not soak your wound by bathing or swimming.  You do not need to bandage a wound that was closed with skin glue. The glue works like a bandage.  Do not use antibiotic ointment (sample brand names: Polysporin, Bacitracin) - This can cause the glue to break down too quickly.  You can shower while the glue is on your skin, but do not take a bath, soak in water, or scrub the area for 7 to 10 days. Dry your skin by patting it gently with a towel.  The glue will peel off on its own, usually in 5 to 10 days. If the glue is still on your skin after 10 days, you can use antibiotic ointment or petroleum jelly (sample brand name: Vaseline) to get it off.       Please keep your wound covered as it heals. Use a thin layer of antibiotic ointment (mupirocin) to help keep the wound moist. This will also keep the dressing from sticking to the wound.  After 24 hours injury, you can gently wash the wound with soap and water. Pat dry and put on a clean dressing. A telfa pad will not stick to the wound.  Change your dressing once a day or every other day.  Always wash your hands before and after touching the wound.  Each time you change the dressing, look closely at the wound to be sure it is healing the right way. The wound may have a yellowish discharge, and this is normal.  Avoid picking the scab or scratching the site which may cause more irritation.  Do not soak in water or swim with an open wound.  Do not use hydrogen peroxide; it will cause the wound to dry out or delay wound healing/new skin growth.  Please complete full course of oral antibiotics.   Please return in 14 days on/after 11/30/23 for suture removal.              Please remember that you have received care at an urgent care today. Urgent cares are not emergency  rooms and are not equipped to handle life threatening emergencies and cannot rule in or out certain medical conditions and you may be released before all of your medical problems are known or treated. Please arrange follow up with your primary care physician or speciality clinic  within 2-5 days if your signs and symptoms have not resolved or worsen. Patient can call our Referral Hotline at [phone] to make an appointment.    Please return here or go to the Emergency Department for any concerns or worsening of condition.Patient was educated on signs/symptoms that would warrant emergent medical attention. Patient verbalized understanding.  Signs of infection. These include a fever of 100.4°F (38°C) or higher, chills, or wound that will not heal.  The pain in and around the area gets much worse.  There is a bad smell or pus (thick yellow, green, or gray fluid) coming from your wound.  You notice a crunchy feeling or blisters in the skin around the wound.  The redness around your wound gets bigger or is spreading up your arm or leg.  Fluid that is not pus drains from your wound.  Your swelling doesnt improve or gets worse.

## 2023-11-30 ENCOUNTER — CLINICAL SUPPORT (OUTPATIENT)
Dept: URGENT CARE | Facility: CLINIC | Age: 38
End: 2023-11-30
Payer: MEDICARE

## 2023-11-30 VITALS
BODY MASS INDEX: 22.96 KG/M2 | TEMPERATURE: 99 F | SYSTOLIC BLOOD PRESSURE: 160 MMHG | HEIGHT: 64 IN | OXYGEN SATURATION: 95 % | RESPIRATION RATE: 18 BRPM | WEIGHT: 134.5 LBS | DIASTOLIC BLOOD PRESSURE: 111 MMHG | HEART RATE: 102 BPM

## 2023-11-30 DIAGNOSIS — Z48.02 ENCOUNTER FOR REMOVAL OF SUTURES: Primary | ICD-10-CM

## 2023-11-30 PROCEDURE — 99213 PR OFFICE/OUTPT VISIT, EST, LEVL III, 20-29 MIN: ICD-10-PCS | Mod: S$GLB,,, | Performed by: PHYSICIAN ASSISTANT

## 2023-11-30 PROCEDURE — 99024 POSTOP FOLLOW-UP VISIT: CPT | Mod: S$GLB,,, | Performed by: PHYSICIAN ASSISTANT

## 2023-11-30 PROCEDURE — 99213 OFFICE O/P EST LOW 20 MIN: CPT | Mod: S$GLB,,, | Performed by: PHYSICIAN ASSISTANT

## 2023-11-30 PROCEDURE — 99024 SUTURE REMOVAL: ICD-10-PCS | Mod: S$GLB,,, | Performed by: PHYSICIAN ASSISTANT

## 2023-11-30 NOTE — PATIENT INSTRUCTIONS
Recommend new skin topical band aid. Keep wound moisturized with mupirocin or vaseline after.            Please keep your wound covered as it heals. Use a thin layer of antibiotic ointment (mupirocin) to help keep the wound moist. This will also keep the dressing from sticking to the wound.  After 24 hours injury, you can gently wash the wound with soap and water. Pat dry and put on a clean dressing. A telfa pad will not stick to the wound.  Change your dressing once a day or every other day.  Always wash your hands before and after touching the wound.  Each time you change the dressing, look closely at the wound to be sure it is healing the right way. The wound may have a yellowish discharge, and this is normal.  Avoid picking the scab or scratching the site which may cause more irritation.  Do not soak in water or swim with an open wound.  Do not use hydrogen peroxide; it will cause the wound to dry out or delay wound healing/new skin growth.        Please remember that you have received care at an urgent care today. Urgent cares are not emergency rooms and are not equipped to handle life threatening emergencies and cannot rule in or out certain medical conditions and you may be released before all of your medical problems are known or treated. Please arrange follow up with your primary care physician or speciality clinic  within 2-5 days if your signs and symptoms have not resolved or worsen. Patient can call our Referral Hotline at [phone] to make an appointment.    Please return here or go to the Emergency Department for any concerns or worsening of condition.Patient was educated on signs/symptoms that would warrant emergent medical attention. Patient verbalized understanding.  Signs of infection. These include a fever of 100.4°F (38°C) or higher, chills, wound that will not heal.  Signs of wound infection. These include swelling, redness, warmth around the wound; too much pain when touched; yellowish,  greenish, or bloody discharge; foul smell coming from the cut site; cut site opens up.

## 2023-11-30 NOTE — PROGRESS NOTES
"Subjective:      Patient ID: Lore Johnson is a 38 y.o. female.    Vitals:  height is 5' 4" (1.626 m) and weight is 61 kg (134 lb 7.7 oz). Her oral temperature is 98.5 °F (36.9 °C). Her blood pressure is 160/111 (abnormal) and her pulse is 102. Her respiration is 18 and oxygen saturation is 95%.     Chief Complaint: Suture / Staple Removal    Lore Johnson is a 38 y.o. female with hypertension, allergic rhinitis, and anxiety who is present for suture removal s/p laceration on left knee on 11/16/23.Stated still having pain and it feel open. Also fell and got 2 of them popped. Patient admits to not taking oral antibiotic.    Suture / Staple Removal  The sutures were placed 7 to 10 days ago. She tried antibiotic ointment use since the wound repair. The treatment provided no relief. There is new redness present. There is no swelling present. The pain has not changed. She has no difficulty moving the affected extremity or digit.     ROS   Objective:     Physical Exam   Constitutional: She is oriented to person, place, and time.      Comments:Patient is awake and alert, sitting up in exam chair, speaking and answering in complete sentences, in no signs of acute distress     normal  HENT:   Head: Normocephalic and atraumatic.   Ears:   Right Ear: External ear normal.   Left Ear: External ear normal.   Nose: Nose normal.   Mouth/Throat: Mucous membranes are moist. Oropharynx is clear.   Eyes: Conjunctivae are normal. Pupils are equal, round, and reactive to light. Extraocular movement intact   Neck: Neck supple.   Cardiovascular: Normal rate, regular rhythm, normal heart sounds and normal pulses.   Pulmonary/Chest: Effort normal and breath sounds normal. No respiratory distress. She has no wheezes. She has no rhonchi. She has no rales.   Abdominal: Normal appearance.   Musculoskeletal: Normal range of motion.         General: Normal range of motion.      Cervical back: She exhibits tenderness.   Lymphadenopathy:     She has " cervical adenopathy.   Neurological: She is alert and oriented to person, place, and time.   Skin: Skin is warm. Capillary refill takes less than 2 seconds. lesion         Comments: Left knee with sutures intact;mid erythema noted; no purulent drainage noted; no wound dehiscence    Psychiatric: Her behavior is normal. Mood, judgment and thought content normal.   Nursing note and vitals reviewed.      Assessment:     1. Encounter for removal of sutures        Plan:       Encounter for removal of sutures      Suture Removal    Date/Time: 11/30/2023 2:30 PM  Location procedure was performed: [facility]    Performed by: Mery Lacey PA-C  Authorized by: Mery Lacey PA-C  Pre-operative diagnosis: Suture removal  Body area: lower extremity  Location details: left knee  Wound Appearance: clean, no drainage, moist, nontender and erythematous  Sutures Removed: 13  Post-removal: antibiotic ointment applied and dressing applied  Facility: sutures placed in this facility  Complications: No  Estimated blood loss (mL): 0  Specimens: No  Implants: No  Patient tolerance: Patient tolerated the procedure well with no immediate complications                    1) See orders for this visit as documented in the electronic medical record.  2) Symptomatic therapy suggested: use acetaminophen/ibuprofen every 6-8 hours prn pain or fever, push fluids.   3) Call or return to clinic prn if these symptoms worsen or fail to improve as anticipated.    Discussed results/diagnosis/plan with patient in clinic.  We had shared decision making for patient's treatment. Patient verbalized understanding and in agreement with current treatment plan.     Patient was instructed to return for re-evaluation with urgent care or PCP for continued outpatient workup and management if symptoms do not improve/worsening symptoms. Strict ED versus clinic precautions given in depth.    Discharge and follow-up instructions given  "verbally/printed with the patient who expressed understanding. The instructions and results are also available on [facility].              [facility] "Rachel" LIZZY Lacey          Patient Instructions   Recommend new skin topical band aid. Keep wound moisturized with mupirocin or vaseline after.            Please keep your wound covered as it heals. Use a thin layer of antibiotic ointment (mupirocin) to help keep the wound moist. This will also keep the dressing from sticking to the wound.  After 24 hours injury, you can gently wash the wound with soap and water. Pat dry and put on a clean dressing. A telfa pad will not stick to the wound.  Change your dressing once a day or every other day.  Always wash your hands before and after touching the wound.  Each time you change the dressing, look closely at the wound to be sure it is healing the right way. The wound may have a yellowish discharge, and this is normal.  Avoid picking the scab or scratching the site which may cause more irritation.  Do not soak in water or swim with an open wound.  Do not use hydrogen peroxide; it will cause the wound to dry out or delay wound healing/new skin growth.        Please remember that you have received care at an urgent care today. Urgent cares are not emergency rooms and are not equipped to handle life threatening emergencies and cannot rule in or out certain medical conditions and you may be released before all of your medical problems are known or treated. Please arrange follow up with your primary care physician or speciality clinic  within 2-5 days if your signs and symptoms have not resolved or worsen. Patient can call our Referral Hotline at [phone] to make an appointment.    Please return here or go to the Emergency Department for any concerns or worsening of condition.Patient was educated on signs/symptoms that would warrant emergent medical attention. Patient verbalized understanding.  Signs of infection. These include a " fever of 100.4°F (38°C) or higher, chills, wound that will not heal.  Signs of wound infection. These include swelling, redness, warmth around the wound; too much pain when touched; yellowish, greenish, or bloody discharge; foul smell coming from the cut site; cut site opens up.

## 2023-12-01 NOTE — PROCEDURES
[facility] [address] [place]  [address]  Phone: [phone]    April 6, 2020      Andre SANCHEZ Leleyakovjessica  [address] [place] [id number]              Dear Andre,    I am writing to report that your included test results are within expected ranges. I do not suggest that we make any changes at this time.         Sincerely,     Chacho Galan M.D.    Results for orders placed or performed in visit on 04/03/20   Basic Metabolic Panel (8) ([facility])     Status: Abnormal   Result Value Ref Range    Glucose 131 (H) 65 - 99 mg/dL    Urea Nitrogen 25 8 - 27 mg/dL    Creatinine 1.36 (H) 0.76 - 1.27 mg/dL    eGFR If NonAfricn Am 54 (L) >59 mL/min/1.73    eGFR If Africn Am 63 >59 mL/min/1.73    BUN/Creatinine Ratio 18 10 - 24    Sodium 136 134 - 144 mmol/L    Potassium 4.6 3.5 - 5.2 mmol/L    Chloride 101 96 - 106 mmol/L    Total CO2 22 20 - 29 mmol/L    Calcium 9.4 8.6 - 10.2 mg/dL    Narrative    Performed at:  01 - [facility] [address], [place], [place]  [id number]  : Cristian Raymundo MD, Phone:  [phone]       Suture Removal    Date/Time: 11/30/2023 2:30 PM  Location procedure was performed: [facility]    Performed by: Mery Lacey PA-C  Authorized by: Mery Lacey PA-C  Pre-operative diagnosis: Suture removal  Body area: lower extremity  Location details: left knee  Wound Appearance: clean, no drainage, moist, nontender and erythematous  Sutures Removed: 13  Post-removal: antibiotic ointment applied and dressing applied  Facility: sutures placed in this facility  Complications: No  Estimated blood loss (mL): 0  Specimens: No  Implants: No  Patient tolerance: Patient tolerated the procedure well with no immediate complications

## 2023-12-12 ENCOUNTER — OFFICE VISIT (OUTPATIENT)
Dept: URGENT CARE | Facility: CLINIC | Age: 38
End: 2023-12-12
Payer: MEDICARE

## 2023-12-12 VITALS
SYSTOLIC BLOOD PRESSURE: 157 MMHG | BODY MASS INDEX: 29.02 KG/M2 | WEIGHT: 170 LBS | HEIGHT: 64 IN | TEMPERATURE: 98 F | HEART RATE: 98 BPM | OXYGEN SATURATION: 96 % | RESPIRATION RATE: 19 BRPM | DIASTOLIC BLOOD PRESSURE: 109 MMHG

## 2023-12-12 DIAGNOSIS — S81.012A KNEE LACERATION, LEFT, INITIAL ENCOUNTER: Primary | ICD-10-CM

## 2023-12-12 PROCEDURE — 96372 THER/PROPH/DIAG INJ SC/IM: CPT | Mod: 59,S$GLB,,

## 2023-12-12 PROCEDURE — 12032 LACERATION REPAIR: ICD-10-PCS | Mod: S$GLB,,,

## 2023-12-12 PROCEDURE — 99213 OFFICE O/P EST LOW 20 MIN: CPT | Mod: 25,S$GLB,,

## 2023-12-12 PROCEDURE — 12032 INTMD RPR S/A/T/EXT 2.6-7.5: CPT | Mod: S$GLB,,,

## 2023-12-12 PROCEDURE — 96372 PR INJECTION,THERAP/PROPH/DIAG2ST, IM OR SUBCUT: ICD-10-PCS | Mod: 59,S$GLB,,

## 2023-12-12 PROCEDURE — 99213 PR OFFICE/OUTPT VISIT, EST, LEVL III, 20-29 MIN: ICD-10-PCS | Mod: 25,S$GLB,,

## 2023-12-12 RX ORDER — MUPIROCIN 20 MG/G
OINTMENT TOPICAL 3 TIMES DAILY
Qty: 30 G | Refills: 0 | Status: SHIPPED | OUTPATIENT
Start: 2023-12-12 | End: 2023-12-22

## 2023-12-12 RX ORDER — DOXYCYCLINE 100 MG/1
100 CAPSULE ORAL 2 TIMES DAILY
Qty: 14 CAPSULE | Refills: 0 | Status: SHIPPED | OUTPATIENT
Start: 2023-12-12 | End: 2023-12-19

## 2023-12-12 RX ORDER — KETOROLAC TROMETHAMINE 30 MG/ML
30 INJECTION, SOLUTION INTRAMUSCULAR; INTRAVENOUS
Status: COMPLETED | OUTPATIENT
Start: 2023-12-12 | End: 2023-12-12

## 2023-12-12 RX ADMIN — KETOROLAC TROMETHAMINE 30 MG: 30 INJECTION, SOLUTION INTRAMUSCULAR; INTRAVENOUS at 07:12

## 2023-12-12 NOTE — PROGRESS NOTES
"Subjective:      Patient ID: Lore Johnson is a 38 y.o. female.    Vitals:  height is 5' 4" (1.626 m) and weight is 77.1 kg (170 lb). Her oral temperature is 98.2 °F (36.8 °C). Her blood pressure is 157/109 (abnormal) and her pulse is 98. Her respiration is 19 and oxygen saturation is 96%.     Chief Complaint: Knee Injury and Laceration (Left Knee )    38-year-old female presents to clinic today with chief complaint of laceration in her left knee.  Patient states that she was walking this morning and fuzzy socks when she slipped and hit open her knee at 8:00 a.m. this morning.  She states that she just cleaned wound and apply topical Neosporin to the area.  She notes that she did wrap it up in a bandage and went about her day as usual.  Patient has not taken anything for pain relief.  Patient was seen on 11/16/2023 for the exact same chief complaint.  Patient was sutured up and was asked to come back 7 days later.  Patient did get all of her sutures removed, but notes that the wound did not heal properly and she did not take her antibiotics as prescribed (see provider's previous note).  She states pain is a constant or out of 10 achy and sharp pain.  Tetanus is up-to-date.  Denies any decreased range of motion.  Patient does have a history of numbness and tingling due to her neuropathy.  She states that when she fell she did not hit her head or lose consciousness.  Denies any radiation of pain. Denies fever, chills, body aches, chest pain, shortness of breath, abdominal pain, nausea, vomiting, diarrhea, or rashes.     Laceration   The incident occurred more than 1 week ago. The laceration is located on the Left leg (Left Knee). The pain is at a severity of 4/10. The pain is mild. The pain has been Constant since onset.       Constitution: Negative for appetite change, chills, sweating, fatigue, fever and generalized weakness.   HENT:  Negative for ear pain, ear discharge, foreign body in ear, tinnitus, hearing loss, " postnasal drip, sinus pain, sinus pressure, sore throat and trouble swallowing.    Neck: Negative for neck pain, neck stiffness and neck swelling.   Cardiovascular:  Negative for chest pain, leg swelling, palpitations and sob on exertion.   Eyes:  Negative for eye discharge, eye itching and eye pain.   Respiratory:  Negative for chest tightness, cough, shortness of breath and wheezing.    Gastrointestinal:  Negative for abdominal pain, nausea, vomiting, constipation and diarrhea.   Genitourinary:  Negative for dysuria, frequency and urgency.   Musculoskeletal:  Positive for pain and trauma. Negative for joint pain, abnormal ROM of joint, muscle cramps and muscle ache.   Skin:  Positive for laceration and bruising. Negative for pale, rash and wound.   Allergic/Immunologic: Negative for environmental allergies, seasonal allergies and food allergies.   Neurological:  Positive for numbness and tingling. Negative for dizziness, light-headedness, headaches, altered mental status and loss of consciousness.   Psychiatric/Behavioral:  Negative for altered mental status and confusion.       Objective:     Physical Exam   Constitutional: She is oriented to person, place, and time. She appears well-developed. She is cooperative.  Non-toxic appearance. She does not appear ill. No distress.   HENT:   Head: Normocephalic and atraumatic. Head is without abrasion, without contusion and without laceration.   Ears:   Right Ear: Hearing, tympanic membrane, external ear and ear canal normal. No hemotympanum.   Left Ear: Hearing, tympanic membrane, external ear and ear canal normal. No hemotympanum.   Nose: Nose normal. No mucosal edema, rhinorrhea or nasal deformity. No epistaxis. Right sinus exhibits no maxillary sinus tenderness and no frontal sinus tenderness. Left sinus exhibits no maxillary sinus tenderness and no frontal sinus tenderness.   Mouth/Throat: Uvula is midline, oropharynx is clear and moist and mucous membranes are  normal. No trismus in the jaw. Normal dentition. No uvula swelling. No posterior oropharyngeal erythema.   Eyes: Conjunctivae, EOM and lids are normal. Pupils are equal, round, and reactive to light. Right eye exhibits no discharge. Left eye exhibits no discharge. No scleral icterus.   Neck: Trachea normal and phonation normal. Neck supple. No tracheal deviation present. No neck rigidity present. No spinous process tenderness present. No muscular tenderness present.   Cardiovascular: Normal rate, regular rhythm, normal heart sounds and normal pulses.   Pulmonary/Chest: Effort normal and breath sounds normal. No respiratory distress.   Abdominal: Normal appearance and bowel sounds are normal. She exhibits no distension and no mass. Soft. There is no abdominal tenderness.   Musculoskeletal:         General: No deformity.      Right knee: Normal.      Left knee: She exhibits decreased range of motion, swelling, laceration, erythema and bony tenderness. She exhibits normal alignment, no LCL laxity, normal patellar mobility, normal meniscus and no MCL laxity. Tenderness found.     Instability Tests: anterior drawer test positive, posterior drawer test positive, left knee positive medial Marco test and left knee positive lateral Marco test       Legs:       Comments: Graded 5/5 bilateral knee flexion/extension.    Neurological: She is alert and oriented to person, place, and time. She has normal strength. No cranial nerve deficit or sensory deficit. She exhibits normal muscle tone. She displays no seizure activity. Coordination normal. GCS eye subscore is 4. GCS verbal subscore is 5. GCS motor subscore is 6.   Skin: Skin is warm, dry, intact, not diaphoretic and not pale. Capillary refill takes less than 2 seconds. Lacerations - lower ext.:  left knee No abrasion, No burn, No bruising and No ecchymosis   Psychiatric: Her speech is normal and behavior is normal. Judgment and thought content normal.   Nursing note and  vitals reviewed.            Assessment:     1. Knee laceration, left, initial encounter        Plan:   Laceration Repair    Date/Time: 12/12/2023 3:15 PM    Performed by: Thu Oliver PA-C  Authorized by: Thu Oliver PA-C  Consent Done: Yes  Consent: Verbal consent obtained.  Risks and benefits: risks, benefits and alternatives were discussed  Consent given by: patient  Patient understanding: patient states understanding of the procedure being performed  Patient consent: the patient's understanding of the procedure matches consent given  Procedure consent: procedure consent matches procedure scheduled  Patient identity confirmed: verbally with patient  Body area: lower extremity  Location details: left knee  Laceration length: 6 cm  Foreign bodies: no foreign bodies  Tendon involvement: none  Nerve involvement: none  Vascular damage: no  Anesthesia: local infiltration    Anesthesia:  Local Anesthetic: lidocaine 1% without epinephrine  Preparation: Patient was prepped and draped in the usual sterile fashion.  Irrigation solution: saline  Irrigation method: syringe  Amount of cleaning: extensive  Debridement: minimal  Degree of undermining: minimal  Skin closure: 5-0 Prolene and Ethilon  Fascia closure: 5-0 Vicryl  Number of sutures: 16  Technique: simple and horizontal mattress  Approximation: close  Approximation difficulty: complex  Dressing: non-stick sterile dressing and antibiotic ointment  Patient tolerance of procedure: Patient did not tolerate procedure well.  Multiple sutures had to be removed and redone due to patient not sitting still after being numbed with local anesthesia.            Knee laceration, left, initial encounter  -     mupirocin (BACTROBAN) 2 % ointment; Apply topically 3 (three) times daily. for 10 days  Dispense: 30 g; Refill: 0  -     doxycycline (VIBRAMYCIN) 100 MG Cap; Take 1 capsule (100 mg total) by mouth 2 (two) times daily. for 7 days  Dispense: 14 capsule; Refill: 0  -      ketorolac injection 30 mg  -     Laceration Repair        Patient placed on doxycycline due to allergy of Keflex and Bactrim.  Instructed her to take antibiotics to completion again need to keep area clean as well as apply topical mupirocin ointment as directed.  Instructed patient to come back in 14 days instead of 7 days due to previous wound not healing correctly in the 7 day time.  Gave patient strict ER/urgent care precautions regarding infection.  Recommended for patient to continue to keep me wrapped and not to go back to normal activities to prevent reopening of the wound.  Patient will come back in 14 days to get sutures removed.  Toradol injection given in clinic today.  Patient can take over-the-counter Tylenol for additional pain relief today.  Start regular over-the-counter ibuprofen as needed tomorrow.  Recommended for patient to rest, elevate and ice as needed.      Patient Instructions   Please drink plenty of fluids.  Please get plenty of rest.  Please return here or go to the Emergency Department for any concerns or worsening of condition.  If you were prescribed antibiotics, please take them to completion.  If you were prescribed a narcotic medication, do not drive or operate heavy equipment or machinery while taking these medications.  Covered.  Apply topical mupirocin ointment 3 times a day.  Patient is to return back in 14 days to get sutures removed.  Discussed red flag symptoms of infection with patient, instructed patient to return back to clinic if symptoms worsened.  Please return here in 1-3 days for a recheck of your wound.  If not allergic, please take over the counter Tylenol (Acetaminophen) and/or Motrin (Ibuprofen) as directed for control of pain and/or fever.  Please follow up with your primary care doctor or specialist as needed.    If you  smoke, please stop smoking.

## 2023-12-13 NOTE — PATIENT INSTRUCTIONS
Please drink plenty of fluids.  Please get plenty of rest.  Please return here or go to the Emergency Department for any concerns or worsening of condition.  If you were prescribed antibiotics, please take them to completion.  If you were prescribed a narcotic medication, do not drive or operate heavy equipment or machinery while taking these medications.  Covered.  Apply topical mupirocin ointment 3 times a day.  Patient is to return back in 14 days to get sutures removed.  Discussed red flag symptoms of infection with patient, instructed patient to return back to clinic if symptoms worsened.  Please return here in 1-3 days for a recheck of your wound.  If not allergic, please take over the counter Tylenol (Acetaminophen) and/or Motrin (Ibuprofen) as directed for control of pain and/or fever.  Please follow up with your primary care doctor or specialist as needed.    If you  smoke, please stop smoking.

## 2023-12-13 NOTE — PROCEDURES
Laceration Repair    Date/Time: 12/12/2023 3:15 PM    Performed by: Thu Oliver PA-C  Authorized by: Thu Oliver PA-C  Consent Done: Yes  Consent: Verbal consent obtained.  Risks and benefits: risks, benefits and alternatives were discussed  Consent given by: patient  Patient understanding: patient states understanding of the procedure being performed  Patient consent: the patient's understanding of the procedure matches consent given  Procedure consent: procedure consent matches procedure scheduled  Patient identity confirmed: verbally with patient  Body area: lower extremity  Location details: left knee  Laceration length: 6 cm  Foreign bodies: no foreign bodies  Tendon involvement: none  Nerve involvement: none  Vascular damage: no  Anesthesia: local infiltration    Anesthesia:  Local Anesthetic: lidocaine 1% without epinephrine  Preparation: Patient was prepped and draped in the usual sterile fashion.  Irrigation solution: saline  Irrigation method: syringe  Amount of cleaning: extensive  Debridement: minimal  Degree of undermining: minimal  Skin closure: 5-0 Prolene and Ethilon  Fascia closure: 5-0 Vicryl  Number of sutures: 16  Technique: simple and horizontal mattress  Approximation: close  Approximation difficulty: complex  Dressing: non-stick sterile dressing and antibiotic ointment  Patient tolerance of procedure: Patient did not tolerate procedure well.  Multiple sutures had to be removed and redone due to patient not sitting still after being numbed with local anesthesia.

## 2024-01-01 ENCOUNTER — CLINICAL SUPPORT (OUTPATIENT)
Dept: URGENT CARE | Facility: CLINIC | Age: 39
End: 2024-01-01
Payer: MEDICARE

## 2024-01-01 VITALS
DIASTOLIC BLOOD PRESSURE: 110 MMHG | WEIGHT: 169.75 LBS | BODY MASS INDEX: 28.98 KG/M2 | OXYGEN SATURATION: 96 % | TEMPERATURE: 98 F | RESPIRATION RATE: 16 BRPM | HEART RATE: 101 BPM | HEIGHT: 64 IN | SYSTOLIC BLOOD PRESSURE: 160 MMHG

## 2024-01-01 DIAGNOSIS — Q03.1: Chronic | ICD-10-CM

## 2024-01-01 DIAGNOSIS — R60.0 EDEMA OF LEFT LOWER EXTREMITY: ICD-10-CM

## 2024-01-01 DIAGNOSIS — I10 HYPERTENSION, UNSPECIFIED TYPE: ICD-10-CM

## 2024-01-01 DIAGNOSIS — R60.0 BILATERAL LEG EDEMA: ICD-10-CM

## 2024-01-01 DIAGNOSIS — T14.8XXA INFECTED WOUND: Primary | ICD-10-CM

## 2024-01-01 DIAGNOSIS — M35.06 SJOGREN'S SYNDROME WITH PERIPHERAL NERVOUS SYSTEM INVOLVEMENT: Chronic | ICD-10-CM

## 2024-01-01 DIAGNOSIS — Z48.02 VISIT FOR SUTURE REMOVAL: ICD-10-CM

## 2024-01-01 DIAGNOSIS — L08.9 INFECTED WOUND: Primary | ICD-10-CM

## 2024-01-01 DIAGNOSIS — L03.116 CELLULITIS OF LEFT LOWER LEG: ICD-10-CM

## 2024-01-01 DIAGNOSIS — Z72.0 TOBACCO USE: ICD-10-CM

## 2024-01-01 PROCEDURE — 99214 OFFICE O/P EST MOD 30 MIN: CPT | Mod: S$GLB,,, | Performed by: PHYSICIAN ASSISTANT

## 2024-01-01 RX ORDER — MUPIROCIN 20 MG/G
OINTMENT TOPICAL 3 TIMES DAILY
Qty: 1 G | Refills: 0 | Status: SHIPPED | OUTPATIENT
Start: 2024-01-01

## 2024-01-01 RX ORDER — AMOXICILLIN AND CLAVULANATE POTASSIUM 875; 125 MG/1; MG/1
1 TABLET, FILM COATED ORAL EVERY 12 HOURS
Qty: 14 TABLET | Refills: 0 | Status: SHIPPED | OUTPATIENT
Start: 2024-01-01 | End: 2024-01-08

## 2024-01-01 RX ORDER — FUROSEMIDE 20 MG/1
20 TABLET ORAL DAILY
Qty: 3 TABLET | Refills: 0 | Status: SHIPPED | OUTPATIENT
Start: 2024-01-01 | End: 2024-01-04

## 2024-01-01 NOTE — PATIENT INSTRUCTIONS
A DVT study has been ordered on your left leg to ensure that you do not have a blood clot in this extremity. It is your responsibility to call and schedule this with the scheduling department at [phone]. If you develop chest pain, shortness of breath, worsening of swelling or have any other concerns, go to the nearest emergency department immediately.     If not allergic,take tylenol (acetominophen) for fever control, chills, or body aches every 4 hours. Do not exceed 4000 mg/ day.If not allergic, take Motrin (Ibuprofen) every 4 hours for fever, chills, pain or inflammation. Do not exceed 2400 mg/day. You can alternate taking tylenol and motrin.     You must understand that you've received an Urgent Care treatment only and that you may be released before all your medical problems are known or treated. You, the patient, will arrange for follow up care as instructed.      Follow up with your PCP or specialty clinic as instructed in the next 2-3 days if not improved or as needed. You can call [phone] to schedule an appointment with appropriate provider.      If you condition worsens, we recommend that you receive another evaluation at the emergency room immediately or contact your primary medical clinic's after hours call service to discuss your concerns.      Please return here or go to the Emergency Department for any concerns or worsening condition.      If you were prescribed a narcotic or controlled substance, do not drive or operate heavy equipment or machinery while taking these medications.

## 2024-01-01 NOTE — PROGRESS NOTES
"Subjective:      Patient ID: Lore Johnson is a 38 y.o. female.    Vitals:  height is 5' 4" (1.626 m) and weight is 77 kg (169 lb 12.1 oz). Her oral temperature is 98.4 °F (36.9 °C). Her blood pressure is 160/110 (abnormal) and her pulse is 101. Her respiration is 16 and oxygen saturation is 96%.     Chief Complaint: Suture / Staple Removal    Pt present to clinic for suture remove. Suture was placed on 12/12/23    Patient provider note starts here:  Patient presents to the clinic with need for suture removal.  Sutures were placed at this facility on 12/12.  She was prescribed doxycycline at that time however, reports that she has not been taking it.  Reports bilateral lower extremity edema but the left leg is greater incised in the right.  Denies any calf pain.  Denies any chest pain or shortness of breath.  She was advised to have the sutures removed in 2 weeks however, presenting here 20 days later.       Suture / Staple Removal  The sutures were placed More than 14 days ago. She tried antibiotic ointment use and oral antibiotics since the wound repair. The treatment provided mild relief. Her temperature was unmeasured prior to arrival. There has been no drainage from the wound. There is no redness present. There is no swelling present. There is no pain present.     Constitution: Negative for fever.   Neck: Negative for neck pain.   Cardiovascular:  Positive for leg swelling. Negative for chest pain, palpitations and sob on exertion.   Respiratory:  Negative for chest tightness, shortness of breath and wheezing.    Gastrointestinal:  Negative for abdominal pain, vomiting and diarrhea.   Skin:  Positive for wound (sutures) and erythema. Negative for color change.   Neurological:  Negative for numbness and tingling.      Objective:     Physical Exam   Constitutional: She is oriented to person, place, and time. She appears well-developed.  Non-toxic appearance. She does not appear ill. No distress.   HENT:   Head: " Normocephalic and atraumatic. Head is without abrasion, without contusion and without laceration.   Ears:   Right Ear: External ear normal.   Left Ear: External ear normal.   Nose: Nose normal.   Mouth/Throat: Oropharynx is clear and moist and mucous membranes are normal.   Eyes: Conjunctivae, EOM and lids are normal. Pupils are equal, round, and reactive to light.   Neck: Trachea normal and phonation normal. Neck supple.   Cardiovascular: Normal rate, regular rhythm and normal heart sounds.   Pulmonary/Chest: Effort normal and breath sounds normal. No stridor. No respiratory distress.   Musculoskeletal: Normal range of motion.         General: Normal range of motion.      Right lower leg: Edema present.      Left lower leg: Edema present.      Comments: Bilateral lower extremity edema noted.  Left leg is greater in size than the right.  No calf tenderness.   Neurological: She is alert and oriented to person, place, and time.   Skin: Skin is warm, dry, intact and no rash. Capillary refill takes less than 2 seconds. erythema No abrasion, No burn, No bruising and No ecchymosis         Comments: Erythema noted to the bilateral lower extremities.  There is warmth to the bilateral lower extremities as well.  Sutures noted to the left knee with significant scabbing.  There is some surrounding erythema.  No purulent discharge.  No significant tenderness with palpation.   Psychiatric: Her speech is normal and behavior is normal. Judgment and thought content normal.   Nursing note and vitals reviewed.      Assessment:     1. Infected wound    2. 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 Sjogren's syndrome with peripheral nervous system involvement    4. Hypertension, unspecified type    5. Tobacco use    6. Cellulitis of left lower leg    7. Bilateral leg edema    8. Edema of left lower extremity    9. Visit for suture removal    Suture Removal    Date/Time: 1/1/2024 1:00 PM  Location procedure was performed: [facility] ([place])  [facility]    Performed by: Vivienne Murphy PA-C  Authorized by: Vivienne Murphy PA-C  Body area: lower extremity  Location details: left knee  Description of findings: Intact sutures with scabbing and surrounding erythema. No purulent drainage   Wound Appearance: erythematous, warm, tender, no drainage, indurated and nonpurulent  Post-removal: no dressing applied  Facility: sutures placed in this facility  Complications: No  Specimens: No  Implants: No  Patient tolerance: Patient tolerated the procedure well with no immediate complications                  Plan:       Infected wound  -     amoxicillin-clavulanate 875-125mg (AUGMENTIN) 875-125 mg per tablet; Take 1 tablet by mouth every 12 (twelve) hours. for 7 days  Dispense: 14 tablet; Refill: 0  -     mupirocin (BACTROBAN) 2 % ointment; Apply topically 3 (three) times daily.  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 for 3 days  Dispense: 3 tablet; Refill: 0    Edema of left lower extremity  -     US Lower Extremity Veins Left; Future; Expected date: 01/01/2024    Visit for suture removal  -     Suture Removal          Medical Decision Making:   History:   Old Medical Records: I decided to obtain old medical records.  Urgent Care Management:  A. 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 Differential diagnosis: cellulitis, abscess, DVT, peripheral edema  C. Diagnostic Testing  Ordered: DVT study   D. Diagnostic Testing Considered: None  E. Independent Historians: None  F. Urgent Care Midlevel Independent Results Interpretation:   G. Radiology:  H. Review of Previous Medical Records: Patient had sutures placed here on 12/12 and was prescribed Doxycycline (which she has not completed). Of note, had a laceration to the same location a week or two prior to that presentation.   I. Home Medications Reviewed  J. Social Determinants of Health considered  K. Medical Decision Making and Disposition:  Patient presents with complaints of needing sutures removed to a laceration on the left knee which replaced on 12/12.  On exam, she is afebrile and nontoxic in appearance.  She is hypertensive however, this appears relative to her baseline according to past chart review.  She is bilateral lower extremity edema with left leg the greater in size in the right.  No calf tenderness noted on exam.  I have ordered a DVT ultrasound study for the patient and suggested that she contact the scheduling department 1st thing tomorrow morning to schedule this.  ED precautions in regards to chest pain, shortness of breath, worsening swelling discussed with the patient as well.  I have also prescribed Augmentin for her as she states that she does not tolerate doxycycline well and is allergic to Bactrim.  Did also prescribe a 3 day course of Lasix due to the bilateral lower extremity edema.  She verbalized understanding and agreed with this plan.         Patient Instructions   A DVT study has been ordered on your left leg to ensure that you do not have a blood clot in this extremity. It is your responsibility to call and schedule this with the scheduling department at [phone]. If you develop chest pain, shortness of breath, worsening of swelling or have any other concerns, go to the nearest emergency department immediately.     If not allergic,take tylenol (acetominophen) for fever control, chills, or body aches  every 4 hours. Do not exceed 4000 mg/ day.If not allergic, take Motrin (Ibuprofen) every 4 hours for fever, chills, pain or inflammation. Do not exceed 2400 mg/day. You can alternate taking tylenol and motrin.     You must understand that you've received an Urgent Care treatment only and that you may be released before all your medical problems are known or treated. You, the patient, will arrange for follow up care as instructed.      Follow up with your PCP or specialty clinic as instructed in the next 2-3 days if not improved or as needed. You can call [phone] to schedule an appointment with appropriate provider.      If you condition worsens, we recommend that you receive another evaluation at the emergency room immediately or contact your primary medical clinic's after hours call service to discuss your concerns.      Please return here or go to the Emergency Department for any concerns or worsening condition.      If you were prescribed a narcotic or controlled substance, do not drive or operate heavy equipment or machinery while taking these medications.

## 2024-01-03 NOTE — PROCEDURES
Suture Removal    Date/Time: 1/1/2024 1:00 PM  Location procedure was performed: [facility]    Performed by: Vivienne Murphy PA-C  Authorized by: Vivienne Murphy PA-C  Body area: lower extremity  Location details: left knee  Description of findings: Intact sutures with scabbing and surrounding erythema. No purulent drainage   Wound Appearance: erythematous, warm, tender, no drainage, indurated and nonpurulent  Post-removal: no dressing applied  Facility: sutures placed in this facility  Complications: No  Specimens: No  Implants: No  Patient tolerance: Patient tolerated the procedure well with no immediate complications

## 2024-01-05 ENCOUNTER — HOSPITAL ENCOUNTER (OUTPATIENT)
Dept: RADIOLOGY | Facility: HOSPITAL | Age: 39
Discharge: HOME | End: 2024-01-05
Attending: PHYSICIAN ASSISTANT
Payer: MEDICARE

## 2024-01-05 ENCOUNTER — TELEPHONE (OUTPATIENT)
Dept: URGENT CARE | Facility: CLINIC | Age: 39
End: 2024-01-05
Payer: MEDICARE

## 2024-01-05 DIAGNOSIS — L03.116 CELLULITIS OF LEFT LOWER LEG: ICD-10-CM

## 2024-01-05 DIAGNOSIS — R60.0 EDEMA OF LEFT LOWER EXTREMITY: ICD-10-CM

## 2024-01-05 PROCEDURE — 93971 EXTREMITY STUDY: CPT | Mod: 26,LT,, | Performed by: RADIOLOGY

## 2024-01-05 PROCEDURE — 93971 EXTREMITY STUDY: CPT | Mod: TC,LT

## 2024-06-28 ENCOUNTER — OFFICE VISIT (OUTPATIENT)
Dept: URGENT CARE | Facility: CLINIC | Age: 39
End: 2024-06-28
Payer: MEDICARE

## 2024-06-28 VITALS
TEMPERATURE: 98 F | HEART RATE: 104 BPM | SYSTOLIC BLOOD PRESSURE: 130 MMHG | BODY MASS INDEX: 35 KG/M2 | HEIGHT: 64 IN | WEIGHT: 205 LBS | OXYGEN SATURATION: 97 % | RESPIRATION RATE: 18 BRPM | DIASTOLIC BLOOD PRESSURE: 87 MMHG

## 2024-06-28 DIAGNOSIS — R05.9 COUGH, UNSPECIFIED TYPE: ICD-10-CM

## 2024-06-28 DIAGNOSIS — B34.9 ACUTE VIRAL SYNDROME: Primary | ICD-10-CM

## 2024-06-28 LAB
CTP QC/QA: YES
SARS-COV-2 AG RESP QL IA.RAPID: NEGATIVE

## 2024-06-28 PROCEDURE — 71046 X-RAY EXAM CHEST 2 VIEWS: CPT | Mod: FY,S$GLB,, | Performed by: RADIOLOGY

## 2024-06-28 RX ORDER — QUETIAPINE FUMARATE 400 MG/1
400 TABLET, FILM COATED ORAL NIGHTLY
COMMUNITY
Start: 2024-06-04

## 2024-06-28 RX ORDER — GABAPENTIN 100 MG/1
100 CAPSULE ORAL 3 TIMES DAILY
COMMUNITY

## 2024-06-28 NOTE — PROGRESS NOTES
"Subjective:      Patient ID: Lore Johnson is a 38 y.o. female.    Vitals:  height is 5' 4" (1.626 m) and weight is 93 kg (205 lb). Her oral temperature is 98.2 °F (36.8 °C). Her blood pressure is 130/87 and her pulse is 104. Her respiration is 18 and oxygen saturation is 97%.     Chief Complaint: Cough    Patient is a 39 y/o female presenting to clinic for cough/sore throat/body aches/runny nose/chills x 2 days.  Patient states she went to the doctor yesterday in which they tested her for strep and flu which were negative.  Patient would like to be tested for covid.  Pt requesting chest x ray, on z pack and cough  Provider note below:  This is a 38 y.o. female who presents today with a chief complaint of cough, sore throat, body aches, runny nose and chills started 2 days ago, patient reports she is seen her primary yesterday tested for flu and strep that came back negative, patient reports her primary prescribed her the cough medication in the Z-Mehul that she started yesterday patient is requesting chest x-ray,  denies fever,  or chills, denies  wheezing or shortness of breath, denies nausea, vomiting, diarrhea or abdominal pain, denies chest pain or dizziness positional lightheadedness, denies  trouble swallowing, denies loss of taste or smell, or any other symptoms       Cough  This is a new problem. The current episode started in the past 7 days. The problem has been unchanged. The problem occurs constantly. The cough is Non-productive. Associated symptoms include chills, nasal congestion, postnasal drip and a sore throat. Pertinent negatives include no ear pain, fever or shortness of breath. She has tried nothing for the symptoms. Her past medical history is significant for pneumonia.       Constitution: Positive for chills. Negative for fever.   HENT:  Positive for postnasal drip and sore throat. Negative for ear pain.    Respiratory:  Positive for cough. Negative for shortness of breath.       Past Medical " History:   Diagnosis Date    Allergy     Anxiety     Hydrocephalus     Hypertension      (ventriculoperitoneal) shunt status     not active currently       Objective:     Physical Exam   Constitutional: She is oriented to person, place, and time. She appears well-developed. She is cooperative.  Non-toxic appearance. She does not appear ill. No distress.      Comments:Patient sitting comfortably on the exam table, non toxic appearance  and answering questions appropriately, no acute distress        HENT:   Head: Normocephalic and atraumatic.   Ears:   Right Ear: Hearing, tympanic membrane, external ear and ear canal normal.   Left Ear: Hearing, tympanic membrane, external ear and ear canal normal.   Nose: Nose normal. No mucosal edema, rhinorrhea or nasal deformity. No epistaxis. Right sinus exhibits no maxillary sinus tenderness and no frontal sinus tenderness. Left sinus exhibits no maxillary sinus tenderness and no frontal sinus tenderness.   Mouth/Throat: Uvula is midline, oropharynx is clear and moist and mucous membranes are normal. No trismus in the jaw. Normal dentition. No uvula swelling. No oropharyngeal exudate, posterior oropharyngeal edema or posterior oropharyngeal erythema.   Eyes: Conjunctivae and lids are normal. No scleral icterus.   Neck: Trachea normal and phonation normal. Neck supple. No edema present. No erythema present. No neck rigidity present.   Cardiovascular: Normal rate, regular rhythm, normal heart sounds and normal pulses.   Pulmonary/Chest: Effort normal and breath sounds normal. No stridor. No respiratory distress. She has no decreased breath sounds. She has no wheezes. She has no rhonchi. She has no rales.   Lungs CTA         Comments: Lungs CTA    Abdominal: Normal appearance.   Musculoskeletal: Normal range of motion.         General: No deformity. Normal range of motion.   Neurological: She is alert and oriented to person, place, and time. She exhibits normal muscle tone.  Coordination normal.   Skin: Skin is warm, dry, intact, not diaphoretic and not pale.   Psychiatric: Her speech is normal and behavior is normal. Judgment and thought content normal.   Nursing note and vitals reviewed.    Results for orders placed or performed in visit on 06/28/24   SARS Coronavirus 2 Antigen, POCT Manual Read   Result Value Ref Range    SARS Coronavirus 2 Antigen Negative Negative     Acceptable Yes        X-Ray Chest PA And Lateral    Result Date: 6/28/2024  EXAMINATION: XR CHEST PA AND LATERAL CLINICAL HISTORY: Cough, unspecified FINDINGS: Chest one view: Heart size is normal.  Lungs are clear.  There is old  shunt tubing.  Bones show no abnormality.     No acute process seen. Electronically signed by: Ajay Moran MD Date:    06/28/2024 Time:    08:39      Patient in no acute distress.  Vitals reassuring.  Discussed results/diagnosis/plan in depth with patient in clinic. Strict precautions given to patient to monitor for worsening signs and symptoms. Advised to follow up with primary.All questions answered. Strict ER precautions given. If your symptoms worsens or fail to improve you should go to the Emergency Room. Discharge and follow-up instructions given verbally/printed. Discharge and follow-up instructions discussed with the patient who expressed understanding and willingness to comply with my recommendations.Patient voiced understanding and in agreement with current treatment plan.     Please be advised this text was dictated with [employer] software and may contain errors due to translation.   Assessment:     1. Acute viral syndrome    2. Cough, unspecified type        Plan:       Acute viral syndrome    Cough, unspecified type  -     SARS Coronavirus 2 Antigen, POCT Manual Read  -     X-Ray Chest PA And Lateral; Future; Expected date: 06/28/2024          Medical Decision Making:   Clinical Tests:   Lab Tests: Reviewed  Radiological Study: Ordered and Reviewed  Urgent Care  Management:  Patient in no acute distress.  Vitals reassuring.  On exam, patient is nontoxic appearing and afebrile.  Lungs CTA.  Negative COVID 19 results discussed with patient in detail.  Chest x-ray results discussed with patient in depth.  Patient taking Z-Mehul and cough medication prescribed by primary.  Re-evaluation recommended if no improvement in symptoms.  Medication prescribed and over-the-counter medication discussed with patient at length.  Proper hydration advised.  I reiterated the importance of further evaluation if no improvement symptoms and follow-up with primary. Patient voiced understanding and in agreement with current treatment plan.             Patient Instructions   PLEASE READ YOUR DISCHARGE INSTRUCTIONS ENTIRELY AS IT CONTAINS IMPORTANT INFORMATION.      Please drink plenty of fluids.    Please get plenty of rest.    Please return here or go to the Emergency Department for any concerns or worsening of condition.    Please take an over the counter antihistamine medication (allegra/Claritin/Zyrtec) of your choice as directed.    Try an over the counter decongestant like Mucinex D or Sudafed. You buy this behind the pharmacy counter    If you do have Hypertension or palpitations, it is safe to take Coricidin HBP for relief of sinus symptoms.    If not allergic, please take over the counter Tylenol (Acetaminophen) and/or Motrin (Ibuprofen) as directed for control of pain and/or fever.  Please follow up with your primary care doctor or specialist as needed.    Sore throat recommendations: Warm fluids, warm salt water gargles, throat lozenges, tea, honey, soup, rest, hydration.    Use over the counter flonase: one spray each nostril twice daily OR two sprays each nostril once daily.     If you  smoke, please stop smoking.      Please return or see your primary care doctor if you develop new or worsening symptoms.     Please arrange follow up with your primary medical clinic as soon as possible.  You must understand that you've received an Urgent Care treatment only and that you may be released before all of your medical problems are known or treated. You, the patient, will arrange for follow up as instructed. If your symptoms worsen or fail to improve you should go to the Emergency Room.